# Patient Record
Sex: FEMALE | Race: WHITE | NOT HISPANIC OR LATINO | Employment: OTHER | URBAN - METROPOLITAN AREA
[De-identification: names, ages, dates, MRNs, and addresses within clinical notes are randomized per-mention and may not be internally consistent; named-entity substitution may affect disease eponyms.]

---

## 2018-05-14 ENCOUNTER — RECORDS - HEALTHEAST (OUTPATIENT)
Dept: LAB | Facility: CLINIC | Age: 68
End: 2018-05-14

## 2018-05-14 LAB
CHOLEST SERPL-MCNC: 183 MG/DL
FASTING STATUS PATIENT QL REPORTED: ABNORMAL
HDLC SERPL-MCNC: 47 MG/DL
LDLC SERPL CALC-MCNC: 117 MG/DL
TRIGL SERPL-MCNC: 97 MG/DL

## 2019-05-17 ENCOUNTER — RECORDS - HEALTHEAST (OUTPATIENT)
Dept: LAB | Facility: CLINIC | Age: 69
End: 2019-05-17

## 2019-05-17 LAB
CHOLEST SERPL-MCNC: 168 MG/DL
FASTING STATUS PATIENT QL REPORTED: NORMAL
HDLC SERPL-MCNC: 50 MG/DL
LDLC SERPL CALC-MCNC: 98 MG/DL
TRIGL SERPL-MCNC: 99 MG/DL

## 2021-02-05 ENCOUNTER — RECORDS - HEALTHEAST (OUTPATIENT)
Dept: LAB | Facility: CLINIC | Age: 71
End: 2021-02-05

## 2021-02-05 LAB
ANION GAP SERPL CALCULATED.3IONS-SCNC: 8 MMOL/L (ref 5–18)
BNP SERPL-MCNC: 45 PG/ML (ref 0–123)
BUN SERPL-MCNC: 16 MG/DL (ref 8–28)
CALCIUM SERPL-MCNC: 8.3 MG/DL (ref 8.5–10.5)
CHLORIDE BLD-SCNC: 109 MMOL/L (ref 98–107)
CO2 SERPL-SCNC: 22 MMOL/L (ref 22–31)
CREAT SERPL-MCNC: 0.8 MG/DL (ref 0.6–1.1)
FERRITIN SERPL-MCNC: 6 NG/ML (ref 10–130)
GFR SERPL CREATININE-BSD FRML MDRD: >60 ML/MIN/1.73M2
GLUCOSE BLD-MCNC: 91 MG/DL (ref 70–125)
POTASSIUM BLD-SCNC: 4 MMOL/L (ref 3.5–5)
SODIUM SERPL-SCNC: 139 MMOL/L (ref 136–145)
TRANSFERRIN SERPL-MCNC: 294 MG/DL (ref 212–360)
TSH SERPL DL<=0.005 MIU/L-ACNC: 0.56 UIU/ML (ref 0.3–5)
VIT B12 SERPL-MCNC: 217 PG/ML (ref 213–816)

## 2021-02-24 ENCOUNTER — RECORDS - HEALTHEAST (OUTPATIENT)
Dept: LAB | Facility: CLINIC | Age: 71
End: 2021-02-24

## 2021-02-24 LAB
CALCIUM SERPL-MCNC: 8.9 MG/DL (ref 8.5–10.5)
CALCIUM, IONIZED MEASURED: 1.2 MMOL/L (ref 1.11–1.3)
ION CA PH 7.4: 1.14 MMOL/L (ref 1.11–1.3)
PH: 7.31 (ref 7.35–7.45)

## 2021-03-31 ENCOUNTER — RECORDS - HEALTHEAST (OUTPATIENT)
Dept: LAB | Facility: CLINIC | Age: 71
End: 2021-03-31

## 2021-03-31 LAB
ANION GAP SERPL CALCULATED.3IONS-SCNC: 9 MMOL/L (ref 5–18)
BUN SERPL-MCNC: 13 MG/DL (ref 8–28)
CALCIUM SERPL-MCNC: 8.8 MG/DL (ref 8.5–10.5)
CHLORIDE BLD-SCNC: 104 MMOL/L (ref 98–107)
CO2 SERPL-SCNC: 29 MMOL/L (ref 22–31)
CREAT SERPL-MCNC: 0.79 MG/DL (ref 0.6–1.1)
GFR SERPL CREATININE-BSD FRML MDRD: >60 ML/MIN/1.73M2
GLUCOSE BLD-MCNC: 88 MG/DL (ref 70–125)
POTASSIUM BLD-SCNC: 3.4 MMOL/L (ref 3.5–5)
SODIUM SERPL-SCNC: 142 MMOL/L (ref 136–145)

## 2021-05-24 ENCOUNTER — RECORDS - HEALTHEAST (OUTPATIENT)
Dept: ADMINISTRATIVE | Facility: CLINIC | Age: 71
End: 2021-05-24

## 2021-05-26 ENCOUNTER — RECORDS - HEALTHEAST (OUTPATIENT)
Dept: ADMINISTRATIVE | Facility: CLINIC | Age: 71
End: 2021-05-26

## 2021-05-28 ENCOUNTER — RECORDS - HEALTHEAST (OUTPATIENT)
Dept: ADMINISTRATIVE | Facility: CLINIC | Age: 71
End: 2021-05-28

## 2021-06-02 ENCOUNTER — RECORDS - HEALTHEAST (OUTPATIENT)
Dept: ADMINISTRATIVE | Facility: CLINIC | Age: 71
End: 2021-06-02

## 2021-07-13 ENCOUNTER — RECORDS - HEALTHEAST (OUTPATIENT)
Dept: ADMINISTRATIVE | Facility: CLINIC | Age: 71
End: 2021-07-13

## 2021-07-21 ENCOUNTER — RECORDS - HEALTHEAST (OUTPATIENT)
Dept: ADMINISTRATIVE | Facility: CLINIC | Age: 71
End: 2021-07-21

## 2021-08-07 ENCOUNTER — TRANSFERRED RECORDS (OUTPATIENT)
Dept: HEALTH INFORMATION MANAGEMENT | Facility: CLINIC | Age: 71
End: 2021-08-07

## 2021-08-07 ENCOUNTER — HOSPITAL ENCOUNTER (INPATIENT)
Facility: CLINIC | Age: 71
LOS: 2 days | Discharge: HOME | DRG: 176 | End: 2021-08-09
Attending: EMERGENCY MEDICINE | Admitting: STUDENT IN AN ORGANIZED HEALTH CARE EDUCATION/TRAINING PROGRAM
Payer: COMMERCIAL

## 2021-08-07 ENCOUNTER — APPOINTMENT (OUTPATIENT)
Dept: CARDIOLOGY | Facility: CLINIC | Age: 71
DRG: 176 | End: 2021-08-07
Attending: EMERGENCY MEDICINE
Payer: COMMERCIAL

## 2021-08-07 DIAGNOSIS — I26.99 ACUTE PULMONARY EMBOLISM WITHOUT ACUTE COR PULMONALE, UNSPECIFIED PULMONARY EMBOLISM TYPE (H): Primary | ICD-10-CM

## 2021-08-07 DIAGNOSIS — I26.99 BILATERAL PULMONARY EMBOLISM (H): ICD-10-CM

## 2021-08-07 LAB
ANION GAP SERPL CALCULATED.3IONS-SCNC: 3 MMOL/L (ref 3–14)
BASOPHILS # BLD AUTO: 0.1 10E3/UL (ref 0–0.2)
BASOPHILS NFR BLD AUTO: 1 %
BUN SERPL-MCNC: 9 MG/DL (ref 7–30)
CALCIUM SERPL-MCNC: 7.5 MG/DL (ref 8.5–10.1)
CHLORIDE BLD-SCNC: 111 MMOL/L (ref 94–109)
CO2 SERPL-SCNC: 27 MMOL/L (ref 20–32)
CREAT SERPL-MCNC: 0.8 MG/DL (ref 0.52–1.04)
EOSINOPHIL # BLD AUTO: 0.2 10E3/UL (ref 0–0.7)
EOSINOPHIL NFR BLD AUTO: 3 %
ERYTHROCYTE [DISTWIDTH] IN BLOOD BY AUTOMATED COUNT: 16 % (ref 10–15)
ERYTHROCYTE [DISTWIDTH] IN BLOOD BY AUTOMATED COUNT: 16 % (ref 10–15)
GFR SERPL CREATININE-BSD FRML MDRD: 74 ML/MIN/1.73M2
GLUCOSE BLD-MCNC: 86 MG/DL (ref 70–99)
HCT VFR BLD AUTO: 38.3 % (ref 35–47)
HCT VFR BLD AUTO: 38.3 % (ref 35–47)
HGB BLD-MCNC: 11.7 G/DL (ref 11.7–15.7)
HGB BLD-MCNC: 11.7 G/DL (ref 11.7–15.7)
IMM GRANULOCYTES # BLD: 0 10E3/UL
IMM GRANULOCYTES NFR BLD: 0 %
LVEF ECHO: NORMAL
LYMPHOCYTES # BLD AUTO: 0.9 10E3/UL (ref 0.8–5.3)
LYMPHOCYTES NFR BLD AUTO: 14 %
MCH RBC QN AUTO: 28.6 PG (ref 26.5–33)
MCH RBC QN AUTO: 28.6 PG (ref 26.5–33)
MCHC RBC AUTO-ENTMCNC: 30.5 G/DL (ref 31.5–36.5)
MCHC RBC AUTO-ENTMCNC: 30.5 G/DL (ref 31.5–36.5)
MCV RBC AUTO: 94 FL (ref 78–100)
MCV RBC AUTO: 94 FL (ref 78–100)
MONOCYTES # BLD AUTO: 0.4 10E3/UL (ref 0–1.3)
MONOCYTES NFR BLD AUTO: 7 %
NEUTROPHILS # BLD AUTO: 4.8 10E3/UL (ref 1.6–8.3)
NEUTROPHILS NFR BLD AUTO: 75 %
NRBC # BLD AUTO: 0 10E3/UL
NRBC BLD AUTO-RTO: 0 /100
NT-PROBNP SERPL-MCNC: 1506 PG/ML (ref 0–900)
PLATELET # BLD AUTO: 256 10E3/UL (ref 150–450)
PLATELET # BLD AUTO: 256 10E3/UL (ref 150–450)
POTASSIUM BLD-SCNC: 3.8 MMOL/L (ref 3.4–5.3)
RBC # BLD AUTO: 4.09 10E6/UL (ref 3.8–5.2)
RBC # BLD AUTO: 4.09 10E6/UL (ref 3.8–5.2)
SODIUM SERPL-SCNC: 141 MMOL/L (ref 133–144)
TROPONIN I SERPL-MCNC: 0.03 UG/L (ref 0–0.04)
UFH PPP CHRO-ACNC: 0.5 IU/ML
WBC # BLD AUTO: 6.3 10E3/UL (ref 4–11)
WBC # BLD AUTO: 6.3 10E3/UL (ref 4–11)

## 2021-08-07 PROCEDURE — 99291 CRITICAL CARE FIRST HOUR: CPT | Mod: 25 | Performed by: EMERGENCY MEDICINE

## 2021-08-07 PROCEDURE — 120N000003 HC R&B IMCU UMMC

## 2021-08-07 PROCEDURE — 93308 TTE F-UP OR LMTD: CPT | Performed by: EMERGENCY MEDICINE

## 2021-08-07 PROCEDURE — 85520 HEPARIN ASSAY: CPT | Performed by: EMERGENCY MEDICINE

## 2021-08-07 PROCEDURE — 93306 TTE W/DOPPLER COMPLETE: CPT

## 2021-08-07 PROCEDURE — 85004 AUTOMATED DIFF WBC COUNT: CPT | Performed by: EMERGENCY MEDICINE

## 2021-08-07 PROCEDURE — 83880 ASSAY OF NATRIURETIC PEPTIDE: CPT | Performed by: NURSE PRACTITIONER

## 2021-08-07 PROCEDURE — 96366 THER/PROPH/DIAG IV INF ADDON: CPT | Performed by: EMERGENCY MEDICINE

## 2021-08-07 PROCEDURE — 36415 COLL VENOUS BLD VENIPUNCTURE: CPT | Performed by: EMERGENCY MEDICINE

## 2021-08-07 PROCEDURE — 96365 THER/PROPH/DIAG IV INF INIT: CPT | Performed by: EMERGENCY MEDICINE

## 2021-08-07 PROCEDURE — 84484 ASSAY OF TROPONIN QUANT: CPT | Performed by: EMERGENCY MEDICINE

## 2021-08-07 PROCEDURE — 99207 PR APP CREDIT; MD BILLING SHARED VISIT: CPT | Performed by: NURSE PRACTITIONER

## 2021-08-07 PROCEDURE — 80048 BASIC METABOLIC PNL TOTAL CA: CPT | Performed by: EMERGENCY MEDICINE

## 2021-08-07 PROCEDURE — 93308 TTE F-UP OR LMTD: CPT | Mod: 26 | Performed by: EMERGENCY MEDICINE

## 2021-08-07 PROCEDURE — 99207 PR CDG-HISTORY COMP: MEETS EXP. PROB FOCUSED- DOWN CODED LACK OF PFSH: CPT | Performed by: STUDENT IN AN ORGANIZED HEALTH CARE EDUCATION/TRAINING PROGRAM

## 2021-08-07 PROCEDURE — 93306 TTE W/DOPPLER COMPLETE: CPT | Mod: 26 | Performed by: STUDENT IN AN ORGANIZED HEALTH CARE EDUCATION/TRAINING PROGRAM

## 2021-08-07 PROCEDURE — 99285 EMERGENCY DEPT VISIT HI MDM: CPT | Mod: 25 | Performed by: EMERGENCY MEDICINE

## 2021-08-07 PROCEDURE — 85041 AUTOMATED RBC COUNT: CPT | Performed by: EMERGENCY MEDICINE

## 2021-08-07 PROCEDURE — C9803 HOPD COVID-19 SPEC COLLECT: HCPCS | Performed by: EMERGENCY MEDICINE

## 2021-08-07 PROCEDURE — 99221 1ST HOSP IP/OBS SF/LOW 40: CPT | Mod: AI | Performed by: STUDENT IN AN ORGANIZED HEALTH CARE EDUCATION/TRAINING PROGRAM

## 2021-08-07 PROCEDURE — 250N000011 HC RX IP 250 OP 636: Performed by: EMERGENCY MEDICINE

## 2021-08-07 RX ORDER — FUROSEMIDE 20 MG
20 TABLET ORAL DAILY
COMMUNITY
End: 2022-12-06

## 2021-08-07 RX ORDER — ONDANSETRON 4 MG/1
4 TABLET, ORALLY DISINTEGRATING ORAL EVERY 6 HOURS PRN
Status: DISCONTINUED | OUTPATIENT
Start: 2021-08-07 | End: 2021-08-09 | Stop reason: HOSPADM

## 2021-08-07 RX ORDER — ONDANSETRON 2 MG/ML
4 INJECTION INTRAMUSCULAR; INTRAVENOUS EVERY 30 MIN PRN
Status: DISCONTINUED | OUTPATIENT
Start: 2021-08-07 | End: 2021-08-08

## 2021-08-07 RX ORDER — HYDROMORPHONE HYDROCHLORIDE 1 MG/ML
0.5 INJECTION, SOLUTION INTRAMUSCULAR; INTRAVENOUS; SUBCUTANEOUS
Status: DISCONTINUED | OUTPATIENT
Start: 2021-08-07 | End: 2021-08-09 | Stop reason: HOSPADM

## 2021-08-07 RX ORDER — ONDANSETRON 2 MG/ML
4 INJECTION INTRAMUSCULAR; INTRAVENOUS EVERY 6 HOURS PRN
Status: DISCONTINUED | OUTPATIENT
Start: 2021-08-07 | End: 2021-08-09 | Stop reason: HOSPADM

## 2021-08-07 RX ORDER — HEPARIN SODIUM 10000 [USP'U]/100ML
0-5000 INJECTION, SOLUTION INTRAVENOUS CONTINUOUS
Status: DISCONTINUED | OUTPATIENT
Start: 2021-08-07 | End: 2021-08-08

## 2021-08-07 RX ORDER — FAMOTIDINE 40 MG/1
20 TABLET, FILM COATED ORAL DAILY
COMMUNITY
End: 2021-10-19

## 2021-08-07 RX ADMIN — HEPARIN SODIUM 1886.4 UNITS/HR: 10000 INJECTION, SOLUTION INTRAVENOUS at 15:44

## 2021-08-07 ASSESSMENT — ENCOUNTER SYMPTOMS
FATIGUE: 0
COUGH: 0
SHORTNESS OF BREATH: 1

## 2021-08-07 NOTE — ED NOTES
Bed: ED06  Expected date:   Expected time:   Means of arrival:   Comments:  Hold for Lori Liu--bilateral PE
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
## 2021-08-07 NOTE — ED NOTES
Up to the commode with staff. Staff setup otherwise IND. After movement, SOB O2 WNL on 2L NC. Recovered after a couple of minutes of rest.

## 2021-08-07 NOTE — ED TRIAGE NOTES
Patient BIBA from transfer from [facility] after patient went there for increased SOB.  Patient did have DVT in left leg a few months ago and was on coumadin for a short period but is no longer on them.

## 2021-08-07 NOTE — ED NOTES
Bed: IN02  Expected date: 8/7/21  Expected time: 2:30 PM  Means of arrival:   Comments:  Lori Liu ([birth year]): 70 year old female with 5 days of SOB/PALMER with elevated BNP and D-dimer.  Found to have large bilateral PEs. Patient on 2 liters oxygen; 18 gauge in AC; given 4000 units of Heparin; and trop and covid testing negative.  Call back number: [phone])
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
## 2021-08-08 ENCOUNTER — APPOINTMENT (OUTPATIENT)
Dept: ULTRASOUND IMAGING | Facility: CLINIC | Age: 71
DRG: 176 | End: 2021-08-08
Attending: INTERNAL MEDICINE
Payer: COMMERCIAL

## 2021-08-08 LAB
ANION GAP SERPL CALCULATED.3IONS-SCNC: 5 MMOL/L (ref 3–14)
BUN SERPL-MCNC: 16 MG/DL (ref 7–30)
CALCIUM SERPL-MCNC: 8.4 MG/DL (ref 8.5–10.1)
CHLORIDE BLD-SCNC: 109 MMOL/L (ref 94–109)
CO2 SERPL-SCNC: 24 MMOL/L (ref 20–32)
CREAT SERPL-MCNC: 0.88 MG/DL (ref 0.52–1.04)
ERYTHROCYTE [DISTWIDTH] IN BLOOD BY AUTOMATED COUNT: 15.9 % (ref 10–15)
FERRITIN SERPL-MCNC: 15 NG/ML (ref 8–252)
GFR SERPL CREATININE-BSD FRML MDRD: 66 ML/MIN/1.73M2
GLUCOSE BLD-MCNC: 92 MG/DL (ref 70–99)
HCT VFR BLD AUTO: 34.1 % (ref 35–47)
HGB BLD-MCNC: 10.5 G/DL (ref 11.7–15.7)
MCH RBC QN AUTO: 29 PG (ref 26.5–33)
MCHC RBC AUTO-ENTMCNC: 30.8 G/DL (ref 31.5–36.5)
MCV RBC AUTO: 94 FL (ref 78–100)
NT-PROBNP SERPL-MCNC: 597 PG/ML (ref 0–900)
PLATELET # BLD AUTO: 239 10E3/UL (ref 150–450)
POTASSIUM BLD-SCNC: 3.5 MMOL/L (ref 3.4–5.3)
RADIOLOGIST FLAGS: ABNORMAL
RBC # BLD AUTO: 3.62 10E6/UL (ref 3.8–5.2)
SODIUM SERPL-SCNC: 138 MMOL/L (ref 133–144)
UFH PPP CHRO-ACNC: 0.7 IU/ML
WBC # BLD AUTO: 6.7 10E3/UL (ref 4–11)

## 2021-08-08 PROCEDURE — 250N000013 HC RX MED GY IP 250 OP 250 PS 637: Performed by: NURSE PRACTITIONER

## 2021-08-08 PROCEDURE — 96366 THER/PROPH/DIAG IV INF ADDON: CPT | Performed by: EMERGENCY MEDICINE

## 2021-08-08 PROCEDURE — 86147 CARDIOLIPIN ANTIBODY EA IG: CPT | Performed by: INTERNAL MEDICINE

## 2021-08-08 PROCEDURE — 93970 EXTREMITY STUDY: CPT | Mod: 26 | Performed by: RADIOLOGY

## 2021-08-08 PROCEDURE — 85027 COMPLETE CBC AUTOMATED: CPT | Performed by: NURSE PRACTITIONER

## 2021-08-08 PROCEDURE — 93970 EXTREMITY STUDY: CPT

## 2021-08-08 PROCEDURE — 82728 ASSAY OF FERRITIN: CPT | Performed by: INTERNAL MEDICINE

## 2021-08-08 PROCEDURE — 80048 BASIC METABOLIC PNL TOTAL CA: CPT | Performed by: NURSE PRACTITIONER

## 2021-08-08 PROCEDURE — 99222 1ST HOSP IP/OBS MODERATE 55: CPT | Performed by: INTERNAL MEDICINE

## 2021-08-08 PROCEDURE — 86146 BETA-2 GLYCOPROTEIN ANTIBODY: CPT | Performed by: INTERNAL MEDICINE

## 2021-08-08 PROCEDURE — 36415 COLL VENOUS BLD VENIPUNCTURE: CPT | Performed by: INTERNAL MEDICINE

## 2021-08-08 PROCEDURE — 83921 ORGANIC ACID SINGLE QUANT: CPT | Performed by: INTERNAL MEDICINE

## 2021-08-08 PROCEDURE — 36415 COLL VENOUS BLD VENIPUNCTURE: CPT | Performed by: NURSE PRACTITIONER

## 2021-08-08 PROCEDURE — 250N000011 HC RX IP 250 OP 636: Performed by: NURSE PRACTITIONER

## 2021-08-08 PROCEDURE — 250N000013 HC RX MED GY IP 250 OP 250 PS 637: Performed by: INTERNAL MEDICINE

## 2021-08-08 PROCEDURE — 99233 SBSQ HOSP IP/OBS HIGH 50: CPT | Performed by: INTERNAL MEDICINE

## 2021-08-08 PROCEDURE — 85520 HEPARIN ASSAY: CPT | Performed by: NURSE PRACTITIONER

## 2021-08-08 PROCEDURE — 120N000003 HC R&B IMCU UMMC

## 2021-08-08 PROCEDURE — 85613 RUSSELL VIPER VENOM DILUTED: CPT | Performed by: INTERNAL MEDICINE

## 2021-08-08 PROCEDURE — 83880 ASSAY OF NATRIURETIC PEPTIDE: CPT | Performed by: NURSE PRACTITIONER

## 2021-08-08 RX ORDER — POLYETHYLENE GLYCOL 3350 17 G/17G
17 POWDER, FOR SOLUTION ORAL DAILY PRN
Status: DISCONTINUED | OUTPATIENT
Start: 2021-08-08 | End: 2021-08-09 | Stop reason: HOSPADM

## 2021-08-08 RX ORDER — ACETAMINOPHEN 650 MG/1
650 SUPPOSITORY RECTAL EVERY 4 HOURS PRN
Status: DISCONTINUED | OUTPATIENT
Start: 2021-08-08 | End: 2021-08-09 | Stop reason: HOSPADM

## 2021-08-08 RX ORDER — FAMOTIDINE 20 MG/1
20 TABLET, FILM COATED ORAL DAILY
Status: DISCONTINUED | OUTPATIENT
Start: 2021-08-08 | End: 2021-08-09 | Stop reason: HOSPADM

## 2021-08-08 RX ORDER — LIDOCAINE 40 MG/G
CREAM TOPICAL
Status: DISCONTINUED | OUTPATIENT
Start: 2021-08-08 | End: 2021-08-09 | Stop reason: HOSPADM

## 2021-08-08 RX ORDER — ACETAMINOPHEN 325 MG/1
650 TABLET ORAL EVERY 4 HOURS PRN
Status: DISCONTINUED | OUTPATIENT
Start: 2021-08-08 | End: 2021-08-09 | Stop reason: HOSPADM

## 2021-08-08 RX ORDER — HEPARIN SODIUM 10000 [USP'U]/100ML
0-5000 INJECTION, SOLUTION INTRAVENOUS CONTINUOUS
Status: DISPENSED | OUTPATIENT
Start: 2021-08-08 | End: 2021-08-08

## 2021-08-08 RX ADMIN — FAMOTIDINE 20 MG: 20 TABLET ORAL at 08:58

## 2021-08-08 RX ADMIN — HEPARIN SODIUM 1886 UNITS/HR: 10000 INJECTION, SOLUTION INTRAVENOUS at 05:13

## 2021-08-08 RX ADMIN — ACETAMINOPHEN 650 MG: 325 TABLET, FILM COATED ORAL at 14:41

## 2021-08-08 RX ADMIN — RIVAROXABAN 15 MG: 15 TABLET, FILM COATED ORAL at 19:51

## 2021-08-08 RX ADMIN — Medication 1 MG: at 00:47

## 2021-08-08 RX ADMIN — Medication 1 MG: at 21:07

## 2021-08-08 RX ADMIN — HEPARIN SODIUM 1886 UNITS/HR: 10000 INJECTION, SOLUTION INTRAVENOUS at 00:48

## 2021-08-08 ASSESSMENT — ACTIVITIES OF DAILY LIVING (ADL)
ADLS_ACUITY_SCORE: 17

## 2021-08-08 NOTE — PROGRESS NOTES
[facility]    Medicine Progress Note - Hospitalist Service, Gold 7       Date of Admission:  8/7/2021    Assessment & Plan           Lori Liu is a 70 year old female with past medical history significant for recently treated superficial LLE DVT (3/2021), claudication, EDDIE, GERD, bilateral knee osteoarthritis who presented to an OSH in [facility] with one week of worsening dyspnea and was found to have large bilateral PEs on CT.  PERT activated d/t concern for R heart strain and pt transferred to [facility].       # Dyspnea   # Respiratory failure   # Acute large bilateral PEs w/ septal flattening   Presented to OSH with 1 week dyspnea and found to have large bilateral PE w/ possible R heart strain on CT PE.  PERT team activated, and pt transferred to [facility] with plan for IR to review outside images once available in PACS.  Received heparin bolus during transit.  TTE on admission with decreased LV function, EF 50-55%, mild-moderate RV dilation, and reduced global RV function.  Currently stable on room air.    - no plans for thrombectomy from IR standpoint, will re-consult if hemodynamically unstable   -Await hematology consult given recurrent DVTs  - Continue heparin gtt .  Will place pharmacy consult for DOAC coverage.  If remains stable likely transition to DOAC versus Coumadin later today     # Recent superficial DVT of LLE - Diagnosed in 3/2021.  S/p treatment with warfarin x 1 month.       # Elevated troponin - Trop elevated to 0.025 at OSH.  Likely 2/2 demand in setting of PE.       # ? 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      # GERD - Continue PTA Famotidine.      # EDDIE - Hgb stable at 11.7.  Not currently taking any iron supplements.      # Low serum calcium - Ca 7.5 on admission.  8.4 on 08/08.     Diet: Regular Diet Adult    DVT Prophylaxis: on hepatin gtt  Sierra Catheter: Not present  Central  Lines: None  Code Status:   Full (d/w patient)     Disposition Plan   Expected discharge: Tomorrow recommended to prior living arrangement once anticoagulation plan established.     The patient's care was discussed with the Bedside Nurse, Patient and Patient's Family.    Last Gregorio MD, MD  Hospitalist Service, [address]  Securely message with the Vocera Web Console (learn more here)  Text page via [employer] Paging/Directory  Please see sign in/sign out for up to date coverage information    Clinically Significant Risk Factors Present on Admission               ______________________________________________________________________    Interval History   History reviewed.  Feels breathing is better compared to yesterday.  Denies any chest pain but still walking to the bathroom and back.  Denies any fever/chills  Denies any palpitations  Denies any recent long flights.  Says had only one episode of DVT in the past  4 point ROS done and negative unless mentioned     Data reviewed today: I reviewed all medications, new labs and imaging results over the last 24 hours. I personally reviewed the Echo and CT chest image(s) showing As below.    Physical Exam   BP (!) 142/88 (BP Location: Right arm)   Pulse 69   Temp 98.1  F (36.7  C) (Oral)   Resp 20   Wt 101.4 kg (223 lb 9.6 oz)   SpO2 93%   Gen- pleasant  lying in bed  HEENT- NAD, YOHANA  Neck- supple, no JVD elevation, no thyromegaly  CVS- I+II+ no m/r/g  RS- CTAB  Abdo- soft, no tenderness . No g/r/r   Ext- no edema   CNS- no gross focal deficit    Data    BMPRecent Labs   Lab 08/08/21  0507 08/07/21  1521    141   POTASSIUM 3.5 3.8   CHLORIDE 109 111*   PEDRO 8.4* 7.5*   CO2 24 27   BUN 16 9   CR 0.88 0.80   GLC 92 86     CBC  Recent Labs   Lab 08/08/21  0507 08/07/21  1521   WBC 6.7 6.3  6.3   RBC 3.62* 4.09  4.09   HGB 10.5* 11.7  11.7   HCT 34.1* 38.3  38.3   MCV 94 94  94   MCH 29.0 28.6  28.6   MCHC  30.8* 30.5*  30.5*   RDW 15.9* 16.0*  16.0*    256  256     INRNo lab results found in last 7 days.  LFTsNo lab results found in last 7 days.   PANCNo lab results found in last 7 days.    Recent Results (from the past 24 hour(s))   POC US ECHO LIMITED    Impression    Limited Bedside Cardiac Ultrasound, performed and interpreted by me.   Indication: Shortness of Breath and Bilateral PE.  Parasternal long axis views were acquired.   Image quality was satisfactory.    Findings:    Global left ventricular function appears intact.  There is no evidence of free fluid within the pericardium.    IMPRESSION: Grossly normal left ventricular function and chamber size.  No pericardial effusion..     Echocardiogram Complete   Result Value    LVEF  50-55% (borderline)    [facility]    [id number]  [id number]  [id number]  [id number]^JERRI^MAY^ARCHIE     [facility],[place]  Echocardiography Laboratory  [address] [place] [id number]     Name: DIANA HERRING  MRN: [id number]  : 1950  Study Date: 2021 04:53 PM  Age: 70 yrs  Gender: Female  Patient Location: [facility]  Reason For Study: Pulmonary Emboli  Ordering Physician: MAY GUTHRIE  Performed By: Janiya Haynes RDCS     BSA: 2.1 m2  Height: 65 in  Weight: 231 lb  HR: 75  BP: 148/93 mmHg  ______________________________________________________________________________  Procedure  Complete Portable Echo Adult. Technically difficult study. Poor acoustic  windows.  ______________________________________________________________________________  Interpretation Summary  Technically difficult study. Poor acoustic windows.  Left ventricular function is decreased. The ejection fraction is 50-55%  (borderline).  Mild to moderate right ventricular dilation is present.  Global right ventricular function is mildly to moderately reduced.  No pericardial effusion is  present.  ______________________________________________________________________________  Left Ventricle  Left ventricular wall thickness cannot evaluate. Left ventricular diastolic  function is not assessable. Left ventricular function is decreased. The  ejection fraction is 50-55% (borderline).     Right Ventricle  Mild to moderate right ventricular dilation is present. Global right  ventricular function is mildly to moderately reduced.     Atria  The atria cannot be assessed.     Mitral Valve  Trace mitral insufficiency is present.     Aortic Valve  Trileaflet aortic sclerosis without stenosis.     Tricuspid Valve  Mild tricuspid insufficiency is present. The right ventricular systolic  pressure is approximated at 32.3 mmHg plus the right atrial pressure.  Pulmonary hypertension is present.     Pulmonic Valve  The valve leaflets are not well visualized. On Doppler interrogation, there is  no significant stenosis or regurgitation.     Vessels  The thoracic aorta cannot be assessed. The inferior vena cava was normal in  size with preserved respiratory variability.     Pericardium  No pericardial effusion is present.     ______________________________________________________________________________  MMode/2D Measurements & Calculations  IVSd: 1.4 cm  LVIDd: 4.4 cm  LVIDs: 3.1 cm  LVPWd: 1.1 cm  FS: 29.9 %     LV mass(C)d: 206.1 grams  LV mass(C)dI: 98.0 grams/m2  Ao root diam: 3.5 cm  asc Aorta Diam: 3.8 cm  LVOT diam: 2.2 cm  LVOT area: 3.8 cm2  LA Volume (BP): 58.3 ml  LA Volume Index (BP): 27.8 ml/m2  RWT: 0.50     Doppler Measurements & Calculations  MV E max kale: 47.5 cm/sec  MV A max kale: 75.7 cm/sec  MV E/A: 0.63     MV dec slope: 238.0 cm/sec2  PA acc time: 0.19 sec  TR max kale: 284.0 cm/sec  TR max P.3 mmHg  E/E' av.1  Lateral E/e': 14.1  Medial E/e': 14.1     ______________________________________________________________________________  Report approved by: MD Alber Barrientos  08/07/2021 05:27 PM

## 2021-08-08 NOTE — PLAN OF CARE
Admission          8/7/2021 05:46AM  -----------------------------------------------------------  Reason for admission: Pulmonary Embolism  Primary team notified of pt arrival.  Admitted from: ED  Via: bed  Accompanied by:   Belongings: At bedside  Admission Profile: complete  Teaching: orientation to unit and call light- call light within reach, call don't fall, use of console, meal times, when to call for the RN, and enforced importance of safety   Access: PIV  Telemetry:Placed on pt  Ht./Wt.: complete  Code Status verified on armband: yes  2 RN Skin Assessment Completed By:  Amber Perry  Med Rec completed: yes  Bed surface reassessed with algorithm and charted: yes  New bed surface ordered: no  Suction/Ambu bag/Flowmeter at bedside: yes    Pt status: A+Ox4. VSS. RA-2LNC with activity, dyspnea on exertion. Regular diet. Up to commode - adequate urine output. L PIV with Heparin gtt @ 1886 units/h, Xa therapeutic - recheck in AM    Temp:  [97.7  F (36.5  C)-98.6  F (37  C)] 98.6  F (37  C)  Pulse:  [65-84] 65  Resp:  [16-18] 16  BP: (113-149)/(74-93) 113/74  FiO2 (%):  [0 %] 0 %  SpO2:  [96 %-98 %] 96 %
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
## 2021-08-09 VITALS
WEIGHT: 223.6 LBS | OXYGEN SATURATION: 95 % | DIASTOLIC BLOOD PRESSURE: 87 MMHG | SYSTOLIC BLOOD PRESSURE: 153 MMHG | TEMPERATURE: 97.6 F | RESPIRATION RATE: 24 BRPM | HEART RATE: 62 BPM

## 2021-08-09 LAB
ANION GAP SERPL CALCULATED.3IONS-SCNC: 4 MMOL/L (ref 3–14)
B2 GLYCOPROT1 IGG SERPL IA-ACNC: 4.4 U/ML
B2 GLYCOPROT1 IGM SERPL IA-ACNC: <2.4 U/ML
B2 GLYCOPROT1 IGM SERPL IA-ACNC: <2.4 U/ML
BUN SERPL-MCNC: 18 MG/DL (ref 7–30)
CALCIUM SERPL-MCNC: 8.6 MG/DL (ref 8.5–10.1)
CARDIOLIPIN IGG SER IA-ACNC: 6.8 GPL-U/ML
CARDIOLIPIN IGG SER IA-ACNC: NEGATIVE
CARDIOLIPIN IGM SER IA-ACNC: 2.4 MPL-U/ML
CARDIOLIPIN IGM SER IA-ACNC: NEGATIVE
CHLORIDE BLD-SCNC: 108 MMOL/L (ref 94–109)
CO2 SERPL-SCNC: 29 MMOL/L (ref 20–32)
CREAT SERPL-MCNC: 0.88 MG/DL (ref 0.52–1.04)
ERYTHROCYTE [DISTWIDTH] IN BLOOD BY AUTOMATED COUNT: 15.7 % (ref 10–15)
GFR SERPL CREATININE-BSD FRML MDRD: 66 ML/MIN/1.73M2
GLUCOSE BLD-MCNC: 91 MG/DL (ref 70–99)
HCT VFR BLD AUTO: 34.5 % (ref 35–47)
HGB BLD-MCNC: 10.4 G/DL (ref 11.7–15.7)
MCH RBC QN AUTO: 28.6 PG (ref 26.5–33)
MCHC RBC AUTO-ENTMCNC: 30.1 G/DL (ref 31.5–36.5)
MCV RBC AUTO: 95 FL (ref 78–100)
PLATELET # BLD AUTO: 265 10E3/UL (ref 150–450)
POTASSIUM BLD-SCNC: 3.8 MMOL/L (ref 3.4–5.3)
RBC # BLD AUTO: 3.64 10E6/UL (ref 3.8–5.2)
SODIUM SERPL-SCNC: 141 MMOL/L (ref 133–144)
UFH PPP CHRO-ACNC: >1.1 IU/ML
WBC # BLD AUTO: 5.3 10E3/UL (ref 4–11)

## 2021-08-09 PROCEDURE — 250N000013 HC RX MED GY IP 250 OP 250 PS 637: Performed by: INTERNAL MEDICINE

## 2021-08-09 PROCEDURE — 85027 COMPLETE CBC AUTOMATED: CPT | Performed by: INTERNAL MEDICINE

## 2021-08-09 PROCEDURE — 99239 HOSP IP/OBS DSCHRG MGMT >30: CPT | Performed by: INTERNAL MEDICINE

## 2021-08-09 PROCEDURE — 36415 COLL VENOUS BLD VENIPUNCTURE: CPT | Performed by: STUDENT IN AN ORGANIZED HEALTH CARE EDUCATION/TRAINING PROGRAM

## 2021-08-09 PROCEDURE — 250N000013 HC RX MED GY IP 250 OP 250 PS 637: Performed by: NURSE PRACTITIONER

## 2021-08-09 PROCEDURE — 80048 BASIC METABOLIC PNL TOTAL CA: CPT | Performed by: INTERNAL MEDICINE

## 2021-08-09 PROCEDURE — 85520 HEPARIN ASSAY: CPT | Performed by: STUDENT IN AN ORGANIZED HEALTH CARE EDUCATION/TRAINING PROGRAM

## 2021-08-09 RX ORDER — NALOXONE HYDROCHLORIDE 0.4 MG/ML
0.4 INJECTION, SOLUTION INTRAMUSCULAR; INTRAVENOUS; SUBCUTANEOUS
Status: DISCONTINUED | OUTPATIENT
Start: 2021-08-09 | End: 2021-08-09 | Stop reason: HOSPADM

## 2021-08-09 RX ORDER — NALOXONE HYDROCHLORIDE 0.4 MG/ML
0.2 INJECTION, SOLUTION INTRAMUSCULAR; INTRAVENOUS; SUBCUTANEOUS
Status: DISCONTINUED | OUTPATIENT
Start: 2021-08-09 | End: 2021-08-09 | Stop reason: HOSPADM

## 2021-08-09 RX ADMIN — RIVAROXABAN 15 MG: 15 TABLET, FILM COATED ORAL at 08:19

## 2021-08-09 RX ADMIN — FAMOTIDINE 20 MG: 20 TABLET ORAL at 08:19

## 2021-08-09 ASSESSMENT — ACTIVITIES OF DAILY LIVING (ADL)
ADLS_ACUITY_SCORE: 17

## 2021-08-09 NOTE — DISCHARGE SUMMARY
[facility]  Hospitalist Discharge Summary      Date of Admission:  8/7/2021  Date of Discharge:  8/9/2021  Discharging Provider: Last Gregorio MD, MD  Discharge Team: Hospitalist Service, Gold 7    Discharge Diagnoses   Pulmonary embolism  Deep venous thrombosis    Acute Hypoxemic respiratory failure secondary to above    Follow-ups Needed After Discharge   Follow-up Appointments     Adult [facility] Follow-up and recommended labs and tests      Follow up with primary care provider, Angelika Haney, within 7 days   for hospital follow- up.        Follow up with Hematology , at (location with clinic name or city) [facility],   within 2-3 weeks  to follow up on results.     Appointments on [place] and/or [facility] (with [facility] or [facility]   provider or service). Call [phone] if you haven't heard regarding   these appointments within 7 days of discharge.         {  Unresulted Labs Ordered in the Past 30 Days of this Admission     Date and Time Order Name Status Description    8/8/2021  2:28 PM Methylmalonic Acid In process     8/8/2021  2:27 PM Lupus Anticoagulant Panel In process         Discharge Disposition   Discharged to home  Condition at discharge: Stable    Hospital Course   Lori Liu is a 70 year old female with past medical history significant for recently treated superficial LLE DVT (3/2021), claudication, EDDIE, GERD, bilateral knee osteoarthritis who presented to an OSH in [facility] with one week of worsening dyspnea and was found to have large bilateral PEs on CT.  PERT activated d/t concern for R heart strain and pt transferred to [facility].       # Dyspnea   # Respiratory failure   # Acute large bilateral PEs w/ septal flattening   Presented to OSH with 1 week dyspnea and found to have large bilateral PE w/ possible R heart strain on CT PE.  PERT team activated, and pt transferred to [facility] with plan for IR to review outside images once available in  PACS.  Received heparin bolus during transit.  TTE on admission with decreased LV function, EF 50-55%, mild-moderate RV dilation, and reduced global RV function.  Currently stable on room air.    - no plans for thrombectomy from IR standpoint, she remained hemodynamically stable while in the hospital and was started on heparin drip.  After review by hematology it was transitioned to oral Xarelto 15 mg twice daily for 21 days followed by 20 mg daily.  -She will need follow-up with hematologist as outpatient.     # Elevated troponin - Trop elevated to 0.025 at OSH.  Likely 2/2 demand in setting of PE.       # ? 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      # GERD - Continue PTA Famotidine.      # EDDIE - Hgb stable at 11.7.  Not currently taking any iron supplements.      # Low serum calcium - Ca 7.5 on admission.  8.4 on 08/08.     Consultations This Hospital Stay   PHARMACY IP CONSULT  PHARMACY IP CONSULT  HEMATOLOGY ADULT IP CONSULT  PHARMACY IP CONSULT  PHARMACY IP CONSULT  PHARMACY LIAISON FOR MEDICATION COVERAGE CONSULT  PHARMACY IP CONSULT    Code Status   Full Code    Time Spent on this Encounter   I, Last Gregorio MD, MD, personally saw the patient today and spent greater than 30 minutes discharging this patient.       Last Gregoroi MD, MD  [facility] [address] [address]  Phone: [phone]  ______________________________________________________________________    Physical Exam   Vital Signs: Temp: 97.6  F (36.4  C) Temp src: Oral BP: (!) 153/87 Pulse: 62   Resp: 24 SpO2: 95 % O2 Device: None (Room air) Oxygen Delivery: 2 LPM  Weight: 223 lbs 9.6 oz  Gen- pleasant  lying in bed  HEENT- NAD, YOHANA  Neck- supple, no JVD elevation, no thyromegaly  CVS- I+II+ no m/r/g  RS- CTAB  Abdo- soft, no tenderness . No g/r/r   Ext- no edema   CNS- no gross focal deficit     Primary Care Physician   Angelika WILKINSON  [place]    Discharge Orders      Reason for your hospital stay    Lori Liu is a 70 year old female with past medical history significant for recently treated superficial LLE DVT (3/2021), claudication, EDDIE, GERD, bilateral knee osteoarthritis who presented to an OSH in [facility] with one week of worsening dyspnea and was found to have large bilateral PEs on CT.  PERT activated d/t concern for R heart strain and pt transferred to [facility].     Activity    Your activity upon discharge: activity as tolerated     Discharge Instructions    Xarelto 15 mg twice daily x21 days, followed by 20 mg daily.  She will need to stay anticoagulation indefinitely.      -Lupus anticoagulant, beta-2 glycoprotein 1 IgG and Ig M, cardiolipin IgG and IgM to be followed in  follow-up clinica visit at  at [facility]     Adult [facility] Follow-up and recommended labs and tests    Follow up with primary care provider, Angelika Haney, within 7 days for hospital follow- up.        Follow up with Hematology , at (location with clinic name or city) [facility], within 2-3 weeks  to follow up on results.     Appointments on [place] and/or [facility] (with [facility] or [facility] provider or service). Call [phone] if you haven't heard regarding these appointments within 7 days of discharge.     When to contact your care team    Call your primary doctor if you have any of the following: temperature greater than 100.4 or less than 96,  increased shortness of breath, or increased pain.     Diet    Follow this diet upon discharge: Orders Placed This Encounter      Regular Diet Adult       Significant Results and Procedures   Results for orders placed or performed during the hospital encounter of 08/07/21   POC US ECHO LIMITED    Impression    Limited Bedside Cardiac Ultrasound, performed and interpreted by me.   Indication: Shortness of Breath and Bilateral PE.  Parasternal long axis views were acquired.   Image  quality was satisfactory.    Findings:    Global left ventricular function appears intact.  There is no evidence of free fluid within the pericardium.    IMPRESSION: Grossly normal left ventricular function and chamber size.  No pericardial effusion..     US Lower Extremity Venous Duplex Bilateral     Value    Radiologist flags DVT (Urgent)    Narrative    EXAMINATION: DOPPLER VENOUS ULTRASOUND OF BILATERAL LOWER EXTREMITIES,  8/8/2021 5:51 PM     COMPARISON: None.    HISTORY: Swelling, rule out DVT    TECHNIQUE:  Gray-scale evaluation with compression, spectral flow and  color Doppler assessment of the deep venous system of both legs from  groin to knee, and then at the ankles.    FINDINGS:  The right popliteal and one on the paired posterior tibial veins are  partially compressible, with the other paired posterior tibial vein  noncompressible. The right common femoral and femoral vein are fully  compressible. The left common femoral, femoral, popliteal, posterior  tibial veins are fully compressible with normal blood flow. There is a  3.8 x 6.2 x 1.7 cm anechoic fluid collection in the left popliteal  fossa.      Impression    IMPRESSION:  1.  Nonocclusive deep vein thrombosis of the right popliteal and 1 of  the paired posterior tibial veins. Occlusive thrombus of the other  paired posterior tibial vein.  2.  Left sided Baker's cyst.       [Urgent Result: DVT]    Finding was identified on 8/8/2021 5:50 PM.     Kris Arteaga RN was contacted by Dr. Romero at 8/8/2021 5:53 PM and  verbalized understanding of the urgent finding.     I have personally reviewed the examination and initial interpretation  and I agree with the findings.    RONALD MOSER MD         SYSTEM ID:  [id number]   Echocardiogram Complete     Value    LVEF  50-55% (borderline)    Narrative    [id number]  [id number]  [id number]  [id number]^JERRI^MAY^ARCHIE     [facility],[place]  Echocardiography Laboratory  [address] [place] [address]  [place] [address]     Name: DIANA HERRING  MRN: [id number]  : 1950  Study Date: 2021 04:53 PM  Age: 70 yrs  Gender: Female  Patient Location: [facility]  Reason For Study: Pulmonary Emboli  Ordering Physician: MAY GUTHRIE  Performed By: Janiya Haynes RDCS     BSA: 2.1 m2  Height: 65 in  Weight: 231 lb  HR: 75  BP: 148/93 mmHg  ______________________________________________________________________________  Procedure  Complete Portable Echo Adult. Technically difficult study. Poor acoustic  windows.  ______________________________________________________________________________  Interpretation Summary  Technically difficult study. Poor acoustic windows.  Left ventricular function is decreased. The ejection fraction is 50-55%  (borderline).  Mild to moderate right ventricular dilation is present.  Global right ventricular function is mildly to moderately reduced.  No pericardial effusion is present.  ______________________________________________________________________________  Left Ventricle  Left ventricular wall thickness cannot evaluate. Left ventricular diastolic  function is not assessable. Left ventricular function is decreased. The  ejection fraction is 50-55% (borderline).     Right Ventricle  Mild to moderate right ventricular dilation is present. Global right  ventricular function is mildly to moderately reduced.     Atria  The atria cannot be assessed.     Mitral Valve  Trace mitral insufficiency is present.     Aortic Valve  Trileaflet aortic sclerosis without stenosis.     Tricuspid Valve  Mild tricuspid insufficiency is present. The right ventricular systolic  pressure is approximated at 32.3 mmHg plus the right atrial pressure.  Pulmonary hypertension is present.     Pulmonic Valve  The valve leaflets are not well visualized. On Doppler interrogation, there is  no significant stenosis or regurgitation.     Vessels  The thoracic aorta cannot be assessed. The inferior  vena cava was normal in  size with preserved respiratory variability.     Pericardium  No pericardial effusion is present.     ______________________________________________________________________________  MMode/2D Measurements & Calculations  IVSd: 1.4 cm  LVIDd: 4.4 cm  LVIDs: 3.1 cm  LVPWd: 1.1 cm  FS: 29.9 %     LV mass(C)d: 206.1 grams  LV mass(C)dI: 98.0 grams/m2  Ao root diam: 3.5 cm  asc Aorta Diam: 3.8 cm  LVOT diam: 2.2 cm  LVOT area: 3.8 cm2  LA Volume (BP): 58.3 ml  LA Volume Index (BP): 27.8 ml/m2  RWT: 0.50     Doppler Measurements & Calculations  MV E max kale: 47.5 cm/sec  MV A max kale: 75.7 cm/sec  MV E/A: 0.63     MV dec slope: 238.0 cm/sec2  PA acc time: 0.19 sec  TR max kale: 284.0 cm/sec  TR max P.3 mmHg  E/E' av.1  Lateral E/e': 14.1  Medial E/e': 14.1     ______________________________________________________________________________  Report approved by: MD Alber Barrientos 2021 05:27 PM               Discharge Medications   Discharge Medication List as of 2021 11:28 AM      START taking these medications    Details   !! rivaroxaban ANTICOAGULANT (XARELTO ANTICOAGULANT) 20 MG TABS tablet Take 1 tablet (20 mg) by mouth daily (with dinner), Disp-30 tablet, R-4, E-Prescribe      !! rivaroxaban ANTICOAGULANT (XARELTO) 15 MG TABS tablet Take 1 tablet (15 mg) by mouth 2 times daily (with meals) for 21 days Followed by 20 mg daily, Disp-42 tablet, R-0, E-Prescribe       !! - Potential duplicate medications found. Please discuss with provider.      CONTINUE these medications which have NOT CHANGED    Details   famotidine (PEPCID) 40 MG tablet Take 20 mg by mouth daily, Historical      furosemide (LASIX) 20 MG tablet Take 20 mg by mouth daily, Historical           Allergies   Allergies   Allergen Reactions     Penicillins Hives and Itching

## 2021-08-09 NOTE — PLAN OF CARE
Neuro: A&Ox4.   Cardiac: SR, rates 60s - 80s. VSS, afebrile.   Respiratory: Sats >92% on RA while awake, 1-2L via NC while resting sats 88-92%. Mild PALMER.  GI/: Adequate UOP. No BM today, LBM 8/6.  Diet/appetite: Regular diet, good appetite   Activity: SBA up to bathroom and in halls.  Pain: Denies pain besides slight H/A this afternoon, relieved w/Tylenol x1.   Skin/LDAs: No new deficits noted. LPIV: SL'ed  *Discontinued Heparin gtt @19:00, started on Xarelto per orders-   *Ultrasound BLE done, positive for DVT in RLE near knee (providers aware, see note)    Plan: Plans for possible discharge home tomorrow, depends on whether anticoag adjustments needed. Continue with POC. Notify primary team with changes.

## 2021-08-09 NOTE — PROVIDER NOTIFICATION
Time of notification: 7:03 PM  Provider notified: Alessandra Devine CNP  Reason for notification: *Pt has DVT in Right lower extremity, notified by Dr. Romero (Radiology [id number]). Still want Heparin gtt stopped & Xarelto given? Thanks, Kris RN [phone]  Temp:  [97.6  F (36.4  C)-98.6  F (37  C)] 98.5  F (36.9  C)  Pulse:  [63-87] 87  Resp:  [16-20] 18  BP: (113-149)/(74-88) 142/84  FiO2 (%):  [0 %] 0 %  SpO2:  [93 %-98 %] 96 %  Orders received: Alessandra Devine CNP- notified Dr. Gregorio (Gold 7) of Ultrasound result & awaiting call back.  19:09 Call back from Alessandra Devine: Per Dr. Gregorio, okay to give Xarelto & discontinue Heparin infusion as ordered. Notify provider if pt has worsening SOB &/or change in oxygen needs or vital sings.

## 2021-08-09 NOTE — PLAN OF CARE
DISCHARGE                         8/9/2021 1300  ----------------------------------------------------------------------------  Discharged to: Home  Via: private transportation  Accompanied by: Family  Discharge Instructions: regular diet, activity as tolerated, medications, follow up appointments, when to call the MD, aftercare instructions.  [facility]; medication list reviewed & sent with pt  Follow Up Appointments: arranged; information given  Belongings: All sent with pt  IV: d/c'd  Telemetry: d/c'd  Pt exhibits understanding of above discharge instructions; all questions answered.    Discharge Paperwork: Signed, copied, and sent home with patient.

## 2021-08-09 NOTE — CONSULTS
Discharge Pharmacy Test Claim    Eliquis or xarelto are covered through patient's [employer] Medicare part D plan. Copay for either DOAC is $47/mo. [facility] has a one-time use 30-day free trial voucher for eliquis or xarelto available.      Mindy Caldwell  [facility] Pharmacy Liaison  Ph: [phone] Pager: [phone]

## 2021-08-12 LAB
DRVVT SCREEN RATIO: 0.92
HEPZYMED PTT RATIO: 1.11
HEPZYMED PTT-LA: 41 SECONDS (ref 31–45)
HEPZYMED THROMBIN TIME: 18.2 SECONDS (ref 15.7–21.7)
INR PPP: 1.3 (ref 0.85–1.15)
LA PPP-IMP: NEGATIVE
LUPUS INTERPRETATION: ABNORMAL
PATIENT PTT-LA: 166 SECONDS (ref 31–45)
PLATELET NEUTRALIZATION: ABNORMAL
THROMBIN TIME: >60 SECONDS (ref 13–19)

## 2021-08-13 LAB — METHYLMALONATE SERPL-SCNC: 0.3 UMOL/L (ref 0–0.4)

## 2021-08-17 ENCOUNTER — LAB REQUISITION (OUTPATIENT)
Dept: LAB | Facility: CLINIC | Age: 71
End: 2021-08-17
Payer: COMMERCIAL

## 2021-08-17 DIAGNOSIS — E83.51 HYPOCALCEMIA: ICD-10-CM

## 2021-08-17 LAB
ANION GAP SERPL CALCULATED.3IONS-SCNC: 11 MMOL/L (ref 5–18)
BUN SERPL-MCNC: 13 MG/DL (ref 8–28)
CALCIUM SERPL-MCNC: 8.9 MG/DL (ref 8.5–10.5)
CHLORIDE BLD-SCNC: 106 MMOL/L (ref 98–107)
CO2 SERPL-SCNC: 23 MMOL/L (ref 22–31)
CREAT SERPL-MCNC: 0.82 MG/DL (ref 0.6–1.1)
GFR SERPL CREATININE-BSD FRML MDRD: 72 ML/MIN/1.73M2
GLUCOSE BLD-MCNC: 92 MG/DL (ref 70–125)
POTASSIUM BLD-SCNC: 4 MMOL/L (ref 3.5–5)
SODIUM SERPL-SCNC: 140 MMOL/L (ref 136–145)

## 2021-08-17 PROCEDURE — 80048 BASIC METABOLIC PNL TOTAL CA: CPT | Mod: ORL | Performed by: PHYSICIAN ASSISTANT

## 2021-10-19 ENCOUNTER — HOSPITAL ENCOUNTER (EMERGENCY)
Facility: CLINIC | Age: 71
Discharge: HOME | End: 2021-10-20
Attending: EMERGENCY MEDICINE | Admitting: INTERNAL MEDICINE
Payer: COMMERCIAL

## 2021-10-19 ENCOUNTER — APPOINTMENT (OUTPATIENT)
Dept: CT IMAGING | Facility: CLINIC | Age: 71
End: 2021-10-19
Attending: EMERGENCY MEDICINE
Payer: COMMERCIAL

## 2021-10-19 ENCOUNTER — APPOINTMENT (OUTPATIENT)
Dept: RADIOLOGY | Facility: CLINIC | Age: 71
End: 2021-10-19
Attending: EMERGENCY MEDICINE
Payer: COMMERCIAL

## 2021-10-19 DIAGNOSIS — R06.00 DYSPNEA: Primary | ICD-10-CM

## 2021-10-19 DIAGNOSIS — R09.02 HYPOXIA: ICD-10-CM

## 2021-10-19 DIAGNOSIS — R06.09 EXERTIONAL DYSPNEA: ICD-10-CM

## 2021-10-19 LAB
ANION GAP SERPL CALCULATED.3IONS-SCNC: 9 MMOL/L (ref 5–18)
ATRIAL RATE - MUSE: 65 BPM
BNP SERPL-MCNC: 50 PG/ML (ref 0–123)
BUN SERPL-MCNC: 11 MG/DL (ref 8–28)
CALCIUM SERPL-MCNC: 9.5 MG/DL (ref 8.5–10.5)
CHLORIDE BLD-SCNC: 106 MMOL/L (ref 98–107)
CO2 SERPL-SCNC: 25 MMOL/L (ref 22–31)
CREAT SERPL-MCNC: 0.95 MG/DL (ref 0.6–1.1)
DIASTOLIC BLOOD PRESSURE - MUSE: 89 MMHG
ERYTHROCYTE [DISTWIDTH] IN BLOOD BY AUTOMATED COUNT: 17.9 % (ref 10–15)
GFR SERPL CREATININE-BSD FRML MDRD: 60 ML/MIN/1.73M2
GLUCOSE BLD-MCNC: 89 MG/DL (ref 70–125)
HCT VFR BLD AUTO: 36.7 % (ref 35–47)
HGB BLD-MCNC: 11.5 G/DL (ref 11.7–15.7)
INTERPRETATION ECG - MUSE: NORMAL
MCH RBC QN AUTO: 31 PG (ref 26.5–33)
MCHC RBC AUTO-ENTMCNC: 31.3 G/DL (ref 31.5–36.5)
MCV RBC AUTO: 99 FL (ref 78–100)
P AXIS - MUSE: 44 DEGREES
PLATELET # BLD AUTO: 292 10E3/UL (ref 150–450)
POTASSIUM BLD-SCNC: 4 MMOL/L (ref 3.5–5)
PR INTERVAL - MUSE: 198 MS
PROCALCITONIN SERPL-MCNC: 0.04 NG/ML (ref 0–0.49)
QRS DURATION - MUSE: 96 MS
QT - MUSE: 442 MS
QTC - MUSE: 459 MS
R AXIS - MUSE: -23 DEGREES
RBC # BLD AUTO: 3.71 10E6/UL (ref 3.8–5.2)
SODIUM SERPL-SCNC: 140 MMOL/L (ref 136–145)
SYSTOLIC BLOOD PRESSURE - MUSE: 139 MMHG
T AXIS - MUSE: 27 DEGREES
TROPONIN I SERPL-MCNC: <0.01 NG/ML (ref 0–0.29)
VENTRICULAR RATE- MUSE: 65 BPM
WBC # BLD AUTO: 8.2 10E3/UL (ref 4–11)

## 2021-10-19 PROCEDURE — 84484 ASSAY OF TROPONIN QUANT: CPT | Performed by: EMERGENCY MEDICINE

## 2021-10-19 PROCEDURE — 71275 CT ANGIOGRAPHY CHEST: CPT

## 2021-10-19 PROCEDURE — 93005 ELECTROCARDIOGRAM TRACING: CPT | Performed by: EMERGENCY MEDICINE

## 2021-10-19 PROCEDURE — 71045 X-RAY EXAM CHEST 1 VIEW: CPT

## 2021-10-19 PROCEDURE — 99220 PR INITIAL OBSERVATION CARE,LEVEL III: CPT | Performed by: INTERNAL MEDICINE

## 2021-10-19 PROCEDURE — 84145 PROCALCITONIN (PCT): CPT | Performed by: EMERGENCY MEDICINE

## 2021-10-19 PROCEDURE — 87637 SARSCOV2&INF A&B&RSV AMP PRB: CPT | Performed by: EMERGENCY MEDICINE

## 2021-10-19 PROCEDURE — C9803 HOPD COVID-19 SPEC COLLECT: HCPCS

## 2021-10-19 PROCEDURE — 36415 COLL VENOUS BLD VENIPUNCTURE: CPT | Performed by: EMERGENCY MEDICINE

## 2021-10-19 PROCEDURE — 80048 BASIC METABOLIC PNL TOTAL CA: CPT | Performed by: EMERGENCY MEDICINE

## 2021-10-19 PROCEDURE — 250N000011 HC RX IP 250 OP 636: Performed by: EMERGENCY MEDICINE

## 2021-10-19 PROCEDURE — 83880 ASSAY OF NATRIURETIC PEPTIDE: CPT | Performed by: EMERGENCY MEDICINE

## 2021-10-19 PROCEDURE — 99285 EMERGENCY DEPT VISIT HI MDM: CPT | Mod: 25

## 2021-10-19 PROCEDURE — 85027 COMPLETE CBC AUTOMATED: CPT | Performed by: EMERGENCY MEDICINE

## 2021-10-19 RX ORDER — IOPAMIDOL 755 MG/ML
100 INJECTION, SOLUTION INTRAVASCULAR ONCE
Status: COMPLETED | OUTPATIENT
Start: 2021-10-19 | End: 2021-10-19

## 2021-10-19 RX ORDER — BACILLUS COAGULANS 1B CELL
1 CAPSULE ORAL DAILY
COMMUNITY
Start: 2021-09-17

## 2021-10-19 RX ORDER — UREA 10 %
1000 LOTION (ML) TOPICAL DAILY
COMMUNITY
Start: 2021-08-27

## 2021-10-19 RX ADMIN — IOPAMIDOL 75 ML: 755 INJECTION, SOLUTION INTRAVENOUS at 19:52

## 2021-10-19 ASSESSMENT — ENCOUNTER SYMPTOMS
SHORTNESS OF BREATH: 1
DIARRHEA: 0
VOMITING: 0
NAUSEA: 0
BLOOD IN STOOL: 1
COUGH: 1
FEVER: 0

## 2021-10-19 ASSESSMENT — MIFFLIN-ST. JEOR: SCORE: 1531.94

## 2021-10-19 NOTE — ED NOTES
Per ED tech Emma, patient able to walk the length of the ED hallway, sats at 95% RA at rest, after ambulation sats dropped to 86% and patient states she is sob.  MD notified.

## 2021-10-19 NOTE — ED TRIAGE NOTES
Patient is here with shortness of breath since August and worsened in the past two weeks. No other symptoms at this time. She did have a PE in August.

## 2021-10-19 NOTE — ED PROVIDER NOTES
EMERGENCY DEPARTMENT ENCOUNTER      NAME: Lori Liu  AGE: 70 year old female  YOB: 1950  MRN: [id number]  EVALUATION DATE & TIME: 10/19/2021  5:53 PM    PCP: Angelika Haney    ED PROVIDER: Manoj Sorenson M.D.      Chief Complaint   Patient presents with     Shortness of Breath         FINAL IMPRESSION:  1. Exertional dyspnea    2. Hypoxia          ED COURSE & MEDICAL DECISION MAKING:    Pertinent Labs & Imaging studies reviewed. (See chart for details)  ED Course as of Oct 19 2237   Tue Oct 19, 2021   1811 Patient is a 70-year-old woman with history of PE 2 months ago, on Xarelto, presents with 2 weeks of progressive exertional dyspnea.  No chest pain.  She is oxygenation of 94% on room air, rest comfortable sitting down.  Plan to screen for CHF, anemia, Covid.  She has been taking her Xarelto, recurrent PE is less likely.      1837 Spo2 dropped to 86% after walking around her room briefly      1839 EKG shows sinus rhythm with sinus arrhythmia, rate of 65.  No acute ischemic ST-T wave morphology.  No significant axis deviation, normal intervals.  When compared to prior EKG on May 16, 2010 there is no significant change.  Impression: Normal sinus rhythm, normal EKG.      1852 Hemoglobin(!): 11.5   1852 WBC: 8.2   1902 BNP: 50   1902 Troponin I: <0.01     I spoke to the pulmonologist Dr. Heard about the imaging.  We do not have any radiology reads due to technical issues, and he was able to identify multiple pulmonary emboli, but we do not have comparison images from August.  Is unclear exactly what is causing her more acute exertional dyspnea based on work-up so far.  No overt signs of heart failure, but she could have pulmonary hypertension, and there could be some issue with medication compliance from her Xarelto.  She is already taken a dose tonight.  She will be admitted for continued exertional hypoxia work-up.    Additional ED Course Timestamps:  6:03 PM Met with patient for  initial interview and exam. Discussed initial plan for care for their stay in the emergency department.  9:02 PM Consulted with Dr. Lala, intensivist.  9:08 PM Rechecked and updated patient, she is agreeable to admission.    At the conclusion of the encounter I discussed the results of all of the tests and the disposition. The questions were answered. The patient or family acknowledged understanding and was agreeable with the care plan.       MEDICATIONS GIVEN IN THE EMERGENCY:  Medications   iopamidol (ISOVUE-370) solution 100 mL (75 mLs Intravenous Given 10/19/21 1952)         NEW PRESCRIPTIONS STARTED AT TODAY'S ER VISIT  New Prescriptions    No medications on file          =================================================================    HPI    Patient information was obtained from: patient    Use of : N/A       Lori Liu is a 70 year old female with a pertinent history of PE, DVT, and GERD who presents to this ED via walk in for evaluation of shortness of breath.    Per chart review: Patient was admitted to the [facility] on 8/7/2021 after presenting with one week of worsening dyspnea and finding large bilateral Pes on CT with possible right heart strain. She was hemodynamically stable while in the hospital and on a heparin drip. She was transitioned to Xarelto 15 mg twice daily for 21 days then 20 mg daily. She was discharged on 8/9/2021.    Patient has been feeling shortness of breath with exertion, which she had a lot of in August when she was diagnosed with PE. She states this slowly improved until the past few weeks it has been worsening again. Today she was voluntering and had to sit down three times because she was out of breath just from standing and doing small tasks. No chest pain with these episodes. She is fully vaccinated against COVID with Moderna but has not had the booster yet. She notes a mild cough that has been present for months, and dark  stools which are common for her because she takes iron for baseline anemia. No fever, nausea, vomiting, diarrhea, or any other complaints.    REVIEW OF SYSTEMS   Review of Systems   Constitutional: Negative for fever.   Respiratory: Positive for cough (present for months) and shortness of breath (exertional).    Cardiovascular: Negative for chest pain.   Gastrointestinal: Positive for blood in stool (dark stools, baseline). Negative for diarrhea, nausea and vomiting.   All other systems reviewed and are negative.      PAST MEDICAL HISTORY:  Past Medical History:   Diagnosis Date     Anemia, iron deficiency      Gastroesophageal reflux disease with esophagitis without hemorrhage      Osteoarthritis of both knees        PAST SURGICAL HISTORY:  No past surgical history on file.        CURRENT MEDICATIONS:    No current facility-administered medications for this encounter.     Current Outpatient Medications   Medication     cimetidine (TAGAMET) 200 MG tablet     cyanocobalamin (VITAMIN B-12) 500 MCG tablet     furosemide (LASIX) 20 MG tablet     omeprazole (PRILOSEC) 20 MG DR capsule     rivaroxaban ANTICOAGULANT (XARELTO ANTICOAGULANT) 20 MG TABS tablet     VITRON-C  MG TABS tablet       ALLERGIES:  Allergies   Allergen Reactions     Cephalexin      Other reaction(s): Itching, itching, rash     Penicillins Hives and Itching       FAMILY HISTORY:  Family History   Problem Relation Age of Onset     Clotting Disorder Father         Does not recall exact issue with bleeding vs clotting disorder     Colon Cancer Mother        SOCIAL HISTORY:   Social History     Socioeconomic History     Marital status:      Spouse name: Not on file     Number of children: Not on file     Years of education: Not on file     Highest education level: Not on file   Occupational History     Not on file   Tobacco Use     Smoking status: Not on file   Substance and Sexual Activity     Alcohol use: Not on file     Drug use: Not on  "file     Sexual activity: Not on file   Other Topics Concern     Not on file   Social History Narrative     Not on file     Social Determinants of Health     Financial Resource Strain:      Difficulty of Paying Living Expenses:    Food Insecurity:      Worried About Running Out of Food in the Last Year:      Ran Out of Food in the Last Year:    Transportation Needs:      Lack of Transportation (Medical):      Lack of Transportation (Non-Medical):    Physical Activity:      Days of Exercise per Week:      Minutes of Exercise per Session:    Stress:      Feeling of Stress :    Social Connections:      Frequency of Communication with Friends and Family:      Frequency of Social Gatherings with Friends and Family:      Attends Zoroastrianism Services:      Active Member of Clubs or Organizations:      Attends Club or Organization Meetings:      Marital Status:    Intimate Partner Violence:      Fear of Current or Ex-Partner:      Emotionally Abused:      Physically Abused:      Sexually Abused:        VITALS:  BP (!) 150/69   Pulse 63   Temp 97.5  F (36.4  C) (Temporal)   Resp 23   Ht 1.651 m (5' 5\")   Wt 101.6 kg (224 lb)   SpO2 97%   BMI 37.28 kg/m      PHYSICAL EXAM    Constitutional: Well developed, well nourished. Comfortable appearing.  HENT: Normocephalic, atraumatic, mucous membranes moist, nose normal. Neck- Supple, gross ROM intact.   Eyes: Pupils mid-range, conjunctiva without injection, no discharge.   Respiratory: Clear to auscultation bilaterally, no respiratory distress, no wheezing, speaks full sentences easily. No cough.  Cardiovascular: Normal heart rate, regular rhythm, no murmurs. No pedal edema.   GI: Soft, no tenderness to deep palpation in all quadrants, no masses.  Musculoskeletal: Moving all 4 extremities intentionally and without pain. No obvious deformity.  Skin: Warm, dry, no rash.  Neurologic: Alert & oriented x 3, cranial nerves grossly intact.  Psychiatric: Affect normal, " cooperative.       LAB:  All pertinent labs reviewed and interpreted.  Labs Ordered and Resulted from Time of ED Arrival Up to the Time of Departure from the ED   CBC WITH PLATELETS - Abnormal; Notable for the following components:       Result Value    RBC Count 3.71 (*)     Hemoglobin 11.5 (*)     MCHC 31.3 (*)     RDW 17.9 (*)     All other components within normal limits   BASIC METABOLIC PANEL - Abnormal; Notable for the following components:    GFR Estimate 60 (*)     All other components within normal limits   B-TYPE NATRIURETIC PEPTIDE ( EAST ONLY) - Normal   PROCALCITONIN - Normal   TROPONIN I - Normal   INFLUENZA A/B & SARS-COV2 PCR MULTIPLEX   PERIPHERAL IV CATHETER   CALL       RADIOLOGY:  Reviewed all pertinent imaging. Please see official radiology report.  XR Chest Port 1 View   Preliminary Result   IMPRESSION: Cardiac enlargement. Pulmonary vascularity within normal limits. Subtle patchy hazy interstitial opacities in the midlungs could be seen with infiltrate or edema. Aortic calcification. No significant bony abnormalities.      CT Chest Pulmonary Embolism w Contrast    (Results Pending)       EKG:    All EKG interpretations will be found in ED course above.      I, Maryanne Cruz am serving as a scribe to document services personally performed by Dr. Manoj Sorenson based on my observation and the provider's statements to me. I, Manoj Sorenson MD attest that Maryanne Cruz is acting in a scribe capacity, has observed my performance of the services and has documented them in accordance with my direction.    Manoj Sorenson M.D.  Emergency Medicine  [facility] EMERGENCY ROOM  [address] [address]  [phone]  Dept: [phone]     Manoj Sorenson MD  10/19/21 8756

## 2021-10-20 ENCOUNTER — APPOINTMENT (OUTPATIENT)
Dept: CARDIOLOGY | Facility: CLINIC | Age: 71
End: 2021-10-20
Attending: INTERNAL MEDICINE
Payer: COMMERCIAL

## 2021-10-20 VITALS
BODY MASS INDEX: 37.32 KG/M2 | HEART RATE: 59 BPM | OXYGEN SATURATION: 93 % | WEIGHT: 224 LBS | SYSTOLIC BLOOD PRESSURE: 142 MMHG | TEMPERATURE: 97.5 F | HEIGHT: 65 IN | DIASTOLIC BLOOD PRESSURE: 86 MMHG | RESPIRATION RATE: 36 BRPM

## 2021-10-20 PROBLEM — R06.00 DYSPNEA: Status: ACTIVE | Noted: 2021-10-20

## 2021-10-20 LAB
ALBUMIN SERPL-MCNC: 3.1 G/DL (ref 3.5–5)
ALP SERPL-CCNC: 39 U/L (ref 45–120)
ALT SERPL W P-5'-P-CCNC: <9 U/L (ref 0–45)
ANION GAP SERPL CALCULATED.3IONS-SCNC: 10 MMOL/L (ref 5–18)
AST SERPL W P-5'-P-CCNC: 11 U/L (ref 0–40)
BASE EXCESS BLDA CALC-SCNC: 2.1 MMOL/L
BASOPHILS # BLD AUTO: 0 10E3/UL (ref 0–0.2)
BASOPHILS NFR BLD AUTO: 1 %
BILIRUB SERPL-MCNC: 0.6 MG/DL (ref 0–1)
BUN SERPL-MCNC: 11 MG/DL (ref 8–28)
CALCIUM SERPL-MCNC: 8.7 MG/DL (ref 8.5–10.5)
CHLORIDE BLD-SCNC: 106 MMOL/L (ref 98–107)
CO2 SERPL-SCNC: 23 MMOL/L (ref 22–31)
COHGB MFR BLD: 93.6 % (ref 95–96)
CREAT SERPL-MCNC: 0.82 MG/DL (ref 0.6–1.1)
EOSINOPHIL # BLD AUTO: 0.2 10E3/UL (ref 0–0.7)
EOSINOPHIL NFR BLD AUTO: 4 %
ERYTHROCYTE [DISTWIDTH] IN BLOOD BY AUTOMATED COUNT: 17.8 % (ref 10–15)
FLUAV RNA SPEC QL NAA+PROBE: NEGATIVE
FLUBV RNA RESP QL NAA+PROBE: NEGATIVE
GFR SERPL CREATININE-BSD FRML MDRD: 72 ML/MIN/1.73M2
GLUCOSE BLD-MCNC: 94 MG/DL (ref 70–125)
HCO3 BLD-SCNC: 26 MMOL/L (ref 23–29)
HCT VFR BLD AUTO: 34.2 % (ref 35–47)
HGB BLD-MCNC: 10.6 G/DL (ref 11.7–15.7)
IMM GRANULOCYTES # BLD: 0 10E3/UL
IMM GRANULOCYTES NFR BLD: 0 %
LVEF ECHO: NORMAL
LYMPHOCYTES # BLD AUTO: 1.1 10E3/UL (ref 0.8–5.3)
LYMPHOCYTES NFR BLD AUTO: 18 %
MCH RBC QN AUTO: 30.1 PG (ref 26.5–33)
MCHC RBC AUTO-ENTMCNC: 31 G/DL (ref 31.5–36.5)
MCV RBC AUTO: 97 FL (ref 78–100)
MONOCYTES # BLD AUTO: 0.5 10E3/UL (ref 0–1.3)
MONOCYTES NFR BLD AUTO: 8 %
NEUTROPHILS # BLD AUTO: 4.2 10E3/UL (ref 1.6–8.3)
NEUTROPHILS NFR BLD AUTO: 69 %
NRBC # BLD AUTO: 0 10E3/UL
NRBC BLD AUTO-RTO: 0 /100
O2/TOTAL GAS SETTING VFR VENT: 21 %
OXYHGB MFR BLD: 91.5 % (ref 95–96)
PCO2 BLD: 39 MM HG (ref 35–45)
PH BLD: 7.44 [PH] (ref 7.37–7.44)
PLATELET # BLD AUTO: 261 10E3/UL (ref 150–450)
PO2 BLD: 76 MM HG (ref 75–85)
POTASSIUM BLD-SCNC: 3.8 MMOL/L (ref 3.5–5)
PROCALCITONIN SERPL-MCNC: 0.04 NG/ML (ref 0–0.49)
PROT SERPL-MCNC: 6 G/DL (ref 6–8)
RBC # BLD AUTO: 3.52 10E6/UL (ref 3.8–5.2)
RSV RNA SPEC NAA+PROBE: NEGATIVE
SARS-COV-2 RNA RESP QL NAA+PROBE: NEGATIVE
SODIUM SERPL-SCNC: 139 MMOL/L (ref 136–145)
TEMPERATURE: 37 DEGREES C
TROPONIN I SERPL-MCNC: <0.01 NG/ML (ref 0–0.29)
TROPONIN T BLD-MCNC: 0.09 UG/L
WBC # BLD AUTO: 6.1 10E3/UL (ref 4–11)

## 2021-10-20 PROCEDURE — 82805 BLOOD GASES W/O2 SATURATION: CPT | Performed by: INTERNAL MEDICINE

## 2021-10-20 PROCEDURE — 255N000002 HC RX 255 OP 636: Performed by: INTERNAL MEDICINE

## 2021-10-20 PROCEDURE — 36415 COLL VENOUS BLD VENIPUNCTURE: CPT | Performed by: INTERNAL MEDICINE

## 2021-10-20 PROCEDURE — 84484 ASSAY OF TROPONIN QUANT: CPT | Performed by: INTERNAL MEDICINE

## 2021-10-20 PROCEDURE — 93306 TTE W/DOPPLER COMPLETE: CPT | Mod: 26 | Performed by: INTERNAL MEDICINE

## 2021-10-20 PROCEDURE — 36600 WITHDRAWAL OF ARTERIAL BLOOD: CPT

## 2021-10-20 PROCEDURE — 85025 COMPLETE CBC W/AUTO DIFF WBC: CPT | Performed by: INTERNAL MEDICINE

## 2021-10-20 PROCEDURE — 82040 ASSAY OF SERUM ALBUMIN: CPT | Performed by: INTERNAL MEDICINE

## 2021-10-20 PROCEDURE — 999N000208 ECHOCARDIOGRAM COMPLETE

## 2021-10-20 PROCEDURE — 99217 PR OBSERVATION CARE DISCHARGE: CPT | Performed by: INTERNAL MEDICINE

## 2021-10-20 PROCEDURE — G0378 HOSPITAL OBSERVATION PER HR: HCPCS

## 2021-10-20 PROCEDURE — 84145 PROCALCITONIN (PCT): CPT | Performed by: INTERNAL MEDICINE

## 2021-10-20 PROCEDURE — 250N000013 HC RX MED GY IP 250 OP 250 PS 637: Performed by: INTERNAL MEDICINE

## 2021-10-20 RX ORDER — FUROSEMIDE 20 MG
20 TABLET ORAL DAILY
Status: DISCONTINUED | OUTPATIENT
Start: 2021-10-20 | End: 2021-10-20 | Stop reason: HOSPADM

## 2021-10-20 RX ORDER — LIDOCAINE 40 MG/G
CREAM TOPICAL
Status: DISCONTINUED | OUTPATIENT
Start: 2021-10-20 | End: 2021-10-20 | Stop reason: HOSPADM

## 2021-10-20 RX ADMIN — PERFLUTREN 2 ML: 6.52 INJECTION, SUSPENSION INTRAVENOUS at 10:55

## 2021-10-20 RX ADMIN — FUROSEMIDE 20 MG: 20 TABLET ORAL at 09:10

## 2021-10-20 ASSESSMENT — ACTIVITIES OF DAILY LIVING (ADL)
ADLS_ACUITY_SCORE: 4
ADLS_ACUITY_SCORE: 4

## 2021-10-20 NOTE — DISCHARGE SUMMARY
[facility] MEDICINE  DISCHARGE SUMMARY     Primary Care Physician: Angelika Haney  Admission Date: 10/19/2021   Discharge Provider: Mayelin Omer MD Discharge Date: 10/20/2021   Diet: Orders Placed This Encounter      Combination Diet Low Saturated Fat Na <2400mg Diet, No Caffeine Diet      Diet      Code Status: Full Code   Activity: activity as tolerated   [facility]      Condition at Discharge: Stable      REASON FOR PRESENTATION(See Admission Note for Details)   Shortness of breath    PRINCIPAL & ACTIVE DISCHARGE DIAGNOSES     Active Problems:  Small pulmonary embolism could be chronic  Obesity  History of PE in August 2021  Large hiatal hernia  Patchy opacities in the lungs follow-up as outpatient.    SIGNIFICANT FINDINGS (Imaging, labs):   Echocardiogram  The visual ejection fraction is 60-65%.   The left ventricle is normal in size.   Normal left ventricular wall motion   The left atrium is moderately dilated.   Right ventricular diastolic pressure is approximated at 7mmHg plus the right   atrial pressure.   Compared to echo from 8/7/21 both LV and RV systolic function have improved.     CT PE run  IMPRESSION:   1.  Exam is positive for a small amount of subsegmental pulmonary emboli to the right lower lobe. No prior studies are available for comparison in this patient with history of recent PE and on blood thinners.     2.  Patchy subpleural hazy linear opacities in the periphery of the lungs. Appearance is suggestive of some degree of underlying interstitial fibrotic change with the possibility of a mild or low-grade superimposed pneumonitis not excluded. Clinical   correlation. Follow-up CT of the chest using high resolution imaging could be obtained once the patient's acute episode resolves for more accurate evaluation of the lung findings.     3.  Cardiac enlargement. No pleural effusions.     4.  Evidence of prior granulomatous disease.     5.  Large hiatal  hernia.       PENDING LABS         PROCEDURES ( this hospitalization only)          RECOMMENDATION FOR F/U VISIT       DISPOSITION     Home    SUMMARY OF HOSPITAL COURSE:    70-year-old lady past medical history significant for obesity,Recurrent pulmonary embolism on Xarelto most recently she had submassive PE in August 2021 RV dysfunction, presented to the emergency room with complaints of exertional shortness of breath worsened over the last 2 weeks from her baseline.  Denied any fevers any chills, cough, chest pain.  CT PE run showed small PE in right lower lobe.  He was hemodynamically stable.  She was on room air.  Continued on Xarelto.  Case was discussed with pulmonary.  Gilman that this could be chronic PE.  Echocardiogram showed improved RV function.  She is discharged home in stable condition.  CT scan also showed a large hiatal hernia, patchy opacities, further work-up is recommended as outpatient.  No sign of infection.  She is discharged home with plans to follow-up with pulmonary.    Discharge Medications with Med changes:        Review of your medicines      CONTINUE these medicines which have NOT CHANGED      Dose / Directions   cimetidine 200 MG tablet  Commonly known as: TAGAMET      Dose: 200 mg  Take 200 mg by mouth daily  Refills: 0     cyanocobalamin 500 MCG tablet  Commonly known as: VITAMIN B-12      Dose: 1,000 mcg  Take 1,000 mcg by mouth daily  Refills: 0     furosemide 20 MG tablet  Commonly known as: LASIX      Dose: 20 mg  Take 20 mg by mouth daily  Refills: 0     omeprazole 20 MG DR capsule  Commonly known as: priLOSEC      Dose: 20 mg  Take 20 mg by mouth daily  Refills: 0     Vitron-C  MG Tabs tablet  Generic drug: ferrous fumarate 65 mg ([place]. FE)-Vitamin C 125 mg      Dose: 1 tablet  Take 1 tablet by mouth daily  Refills: 0     XARELTO ANTICOAGULANT 20 MG Tabs tablet  Generic drug: rivaroxaban ANTICOAGULANT      Dose: 20 mg  Take 20 mg by mouth daily (with dinner)  Refills: 0    "              Rationale for medication changes:            Consults         Immunizations given this encounter         Discharge Procedure Orders   Reason for your hospital stay   Order Comments: Pulmonary embolism which is chronic     Follow-up and recommended labs and tests   Order Comments: Follow up with primary care provider, Angelika Haney, within 7 days for hospital follow- up.  Follow up with pulmonary as recommended     Activity   Order Comments: Your activity upon discharge: activity as tolerated     Order Specific Question Answer Comments   Is discharge order? Yes      Diet   Order Comments: Follow this diet upon discharge: Orders Placed This Encounter      Combination Diet Low Saturated Fat Na <2400mg Diet, No Caffeine Diet       Order Specific Question Answer Comments   Is discharge order? Yes      Examination     Vital Signs in last 24 hours:   Vital signs:  Temp: 97.5  F (36.4  C) Temp src: Temporal BP: 131/78 Pulse: 63   Resp: (!) 32 SpO2: 94 %     Height: 165.1 cm (5' 5\") Weight: 101.6 kg (224 lb)  Estimated body mass index is 37.28 kg/m  as calculated from the following:    Height as of this encounter: 1.651 m (5' 5\").    Weight as of this encounter: 101.6 kg (224 lb).       Pertinent positives on exam:  Lungs: clear to auscultation bilaterally    Please see EMR for more detailed significant labs, imaging, consultant notes etc.  Total time spent on discharge: > 35 minutes    Mayelin Omer MD   [facility] Hospitalist Service: Ph:[phone]    CC:Angelika Haney      "

## 2021-10-20 NOTE — UTILIZATION REVIEW
"Admission Status; Secondary Review Determination     Under the authority of the Utilization Management Committee, the utilization review process indicated a secondary review on Lori Liu.  The review outcome is based on review of the medical records, discussions with staff, and applying clinical experience noted on the date of the review.     (x) Observation Status Appropriate - This patient does not meet hospital inpatient criteria and is placed in observation status. If this patient's primary payer is Medicare and was admitted as an inpatient, Condition Code 44 should be used and patient status changed to \"observation\".     RATIONALE FOR DETERMINATION   70 yr old female with hx PE still on xarelto presented with exertional dyspnea.  No hypoxia.  Small PE on imaging however no strain and ECHO improved from 8/21 (last PE).      The severity of illness, intensity of service provided, expected LOS and risk for adverse outcome make the care appropriate for further observation; however, doesn't meet criteria for hospital inpatient admission. Dr Omer notified of this determination and agrees with downgrade of status.      The information on this document is developed by the utilization review team in order for the business office to ensure compliance.  This only denotes the appropriateness of proper admission status and does not reflect the quality of care rendered.         The definitions of Inpatient Status and Observation Status used in making the determination above are those provided in the CMS Coverage Manual, Chapter 1 and Chapter 6, section 70.4.      Sincerely,  Paula Barrientos MD  Utilization Review  Physician Advisor  [facility]    "

## 2021-10-20 NOTE — H&P
[facility] MEDICINE ADMISSION HISTORY AND PHYSICAL       Assessment & Plan      1. 2 weeks of worsening exertional dyspnea -- Unclear etiology, however, still on xarelto for PE diagnosed last 8/2021 and that time she had --  Acute large bilateral PEs w/ septal flattening and RV strain. No lytics given that time. Her ECHO showed decreased LV function, EF 50-55%, mild-moderate RV dilation, and reduced global RV function.  She has DVT on right LE.    Her PE study today showed small PE. Her BNP and Trop were negative    She has BMI of 37-38, no formal diagnosis of SHASHI.    She did smoker years ago, no diagnosis of COPD     Currently O2 sat is OK at rest 94-97%     - will continue her xarelto, will prob need to repeat ECHO and see if she has developed right sided CHF.   - will consider consulting pulmonary service - for exertional SOB  - tele and pulse ox  - continue serial trops  - she may need stress test at some point   - check ABG       VTE prophylaxis:  on xarelto   IV fluids: Per order set   Diet:  Cardiac   GI prophylaxis: Not indicated at this point, re-assess if needed.   Pain, sleep, and vomiting medications: Per order set  Code Status: Full  COVID test result:  Pending   COVID vaccination: completed   Barriers to discharge: admitting clinical condition  Discharge Disposition and goals:  Unable to determine at this point, pending clinical progress and response to treatment. Patient may need transfer to SNF or ACR if unsafe to go home and needed treatment inappropriate at home setting OR may need home health care evaluation if care can be delivered at home settings. Consider referral to care manager/        Care plan was created based on available information provided, including patient's condition at the time of encounter.   This plan was discussed with patient and/or family members using layman's terms and have agreed to proceed.   At the end of night shift (9PM - 730A),  this case will be presented to the AM Hospitalist.    It is recommended to revise care plan if there is change in condition and/or new clinical information that are not available during my encounter.     All or some of home medication/s were not resumed on admission due to safety reasons or contraindications. Dosing and frequency may also have been modified. Please resume/review them during your visit.     70 minutes of total visit duration and greater than 50% was spent in direct evaluation of patient and coordination of care including discussion of diagnostic test results and recommended treatment. .      Antoni Osorio MD, MPH, FACP, [facility]  Internal Medicine - Hospitalist        Chief Complaint SOB      HISTORY     - Met her in the ED. She was awake and alert. He O2 sat was 94-96% on RA.  - She came in because of SOB. It seems exertional and was on for 2 weeks now. Worse when climbing stairs and sometimes just talking on the phone, gets SOB.  - No cough concerning for infection. No chest pain. No abdominal pain. No diarrhea. No urinary symptoms.  - She has PE diagnosed last 8/2021 and she has been on anticoagulation   - In the ED, PE study was done and showed small PE.     - She was admitted at the [facility] when she was diagnosed and showed -- Acute large bilateral PEs w/ septal flattening and RV strain. No lytics given that time. Her ECHO showed decreased LV function, EF 50-55%, mild-moderate RV dilation, and reduced global RV function.  She has DVT on right LE.    - ROS --- No headache. No dizziness. No weakness. No palpitations. No abdominal pain. No nausea or vomiting. No urinary symptoms. No bleeding symptoms. No weight loss. Rest of 12 point ROS was reviewed and negative.       Past Medical History     Past Medical History:   Diagnosis Date     Anemia, iron deficiency      Gastroesophageal reflux disease with esophagitis without hemorrhage      Osteoarthritis of both knees          Surgical History     Knee  injection     Family History      Family History   Problem Relation Age of Onset     Clotting Disorder Father         Does not recall exact issue with bleeding vs clotting disorder     Colon Cancer Mother          Social History      .  Social History     Socioeconomic History     Marital status:      Spouse name: Not on file     Number of children: Not on file     Years of education: Not on file     Highest education level: Not on file   Occupational History     Not on file   Tobacco Use     Smoking status: Not on file   Substance and Sexual Activity     Alcohol use: Not on file     Drug use: Not on file     Sexual activity: Not on file   Other Topics Concern     Not on file   Social History Narrative     Not on file     Social Determinants of Health     Financial Resource Strain:      Difficulty of Paying Living Expenses:    Food Insecurity:      Worried About Running Out of Food in the Last Year:      Ran Out of Food in the Last Year:    Transportation Needs:      Lack of Transportation (Medical):      Lack of Transportation (Non-Medical):    Physical Activity:      Days of Exercise per Week:      Minutes of Exercise per Session:    Stress:      Feeling of Stress :    Social Connections:      Frequency of Communication with Friends and Family:      Frequency of Social Gatherings with Friends and Family:      Attends [employer] Services:      Active Member of Clubs or Organizations:      Attends Club or Organization Meetings:      Marital Status:    Intimate Partner Violence:      Fear of Current or Ex-Partner:      Emotionally Abused:      Physically Abused:      Sexually Abused:           Allergies        Allergies   Allergen Reactions     Cephalexin      Other reaction(s): Itching, itching, rash     Penicillins Hives and Itching         Prior to Admission Medications      No current facility-administered medications on file prior to encounter.  cyanocobalamin (VITAMIN B-12) 500 MCG tablet, Take 1,000 mcg  "by mouth daily  furosemide (LASIX) 20 MG tablet, Take 20 mg by mouth daily  omeprazole (PRILOSEC) 20 MG DR capsule, Take 20 mg by mouth daily  rivaroxaban ANTICOAGULANT (XARELTO ANTICOAGULANT) 20 MG TABS tablet, Take 20 mg by mouth daily (with dinner)  VITRON-C  MG TABS tablet, Take 1 tablet by mouth daily  famotidine (PEPCID) 40 MG tablet, Take 20 mg by mouth daily            Review of Systems     A 12 point comprehensive review of systems was negative except as noted above in HPI.    PHYSICAL EXAMINATION       Vitals      Vitals: BP (!) 169/98   Pulse 78   Temp 97.5  F (36.4  C) (Temporal)   Resp 24   Ht 1.651 m (5' 5\")   Wt 101.6 kg (224 lb)   SpO2 94%   BMI 37.28 kg/m    BMI= Body mass index is 37.28 kg/m .      Examination     General Appearance:  Alert, cooperative, no distress  Head:    Normocephalic, without obvious abnormality, atraumatic  EENT:  PERRL, conjunctiva/corneas clear, EOM's intact.   Neck:   Supple, symmetrical, trachea midline, no adenopathy; no NVE  Back:  Symmetric, no curvature, no CVA tenderness  Chest/Lungs: Clear to auscultation bilaterally, respirations unlabored, No tenderness or deformity. No abdominal breathing or use of accessory muscles.   Heart:    Regular rate and rhythm, S1 and S2 normal, no murmur, rub   or gallop  Abdomen: Soft, non-tender, bowel sounds active all four quadrants, not peritoneal on palpation. Not distended  Extremities:  Extremities normal, atraumatic, no swelling   Skin:  Skin color, texture, turgor normal, no rashes or lesion  Neurologic:  Awake and alert,  No lateralizing or localizing signs      Pertinent Lab     Results for orders placed or performed during the hospital encounter of 10/19/21   CBC (+ platelets, no diff)   Result Value Ref Range    WBC Count 8.2 4.0 - 11.0 10e3/uL    RBC Count 3.71 (L) 3.80 - 5.20 10e6/uL    Hemoglobin 11.5 (L) 11.7 - 15.7 g/dL    Hematocrit 36.7 35.0 - 47.0 %    MCV 99 78 - 100 fL    MCH 31.0 26.5 - 33.0 pg    " MCHC 31.3 (L) 31.5 - 36.5 g/dL    RDW 17.9 (H) 10.0 - 15.0 %    Platelet Count 292 150 - 450 10e3/uL   Basic metabolic panel   Result Value Ref Range    Sodium 140 136 - 145 mmol/L    Potassium 4.0 3.5 - 5.0 mmol/L    Chloride 106 98 - 107 mmol/L    Carbon Dioxide (CO2) 25 22 - 31 mmol/L    Anion Gap 9 5 - 18 mmol/L    Urea Nitrogen 11 8 - 28 mg/dL    Creatinine 0.95 0.60 - 1.10 mg/dL    Calcium 9.5 8.5 - 10.5 mg/dL    Glucose 89 70 - 125 mg/dL    GFR Estimate 60 (L) >60 mL/min/1.73m2   B-Type Natriuretic Peptide ([facility] Only)   Result Value Ref Range    BNP 50 0 - 123 pg/mL   Result Value Ref Range    Procalcitonin 0.04 0.00 - 0.49 ng/mL   Troponin I (now)   Result Value Ref Range    Troponin I <0.01 0.00 - 0.29 ng/mL   ECG 12-LEAD WITH MUSE ([facility])   Result Value Ref Range    Systolic Blood Pressure 139 mmHg    Diastolic Blood Pressure 89 mmHg    Ventricular Rate 65 BPM    Atrial Rate 65 BPM    CO Interval 198 ms    QRS Duration 96 ms     ms    QTc 459 ms    P Axis 44 degrees    R AXIS -23 degrees    T Axis 27 degrees    Interpretation ECG       Sinus rhythm with sinus arrhythmia  Normal ECG  When compared with ECG of 16-MAY-2010 08:55,  No significant change was found  Confirmed by SEE ED PROVIDER NOTE FOR, ECG INTERPRETATION ([id number]),  SOL ARECHIGA ([id number]) on 10/19/2021 8:40:14 PM             Pertinent Radiology

## 2021-10-20 NOTE — PROGRESS NOTES
CM was called to give a What is Obs to the Pt at 3:30PM by NATALIA Joy. CM went down to the ED to deliver the form as needed and the Pt had discharged before 3:36 PM when the CM went to see the Pt. CM confirmed this with the HUC at the ED Desk.

## 2021-10-20 NOTE — PHARMACY-ADMISSION MEDICATION HISTORY
Pharmacy Note - Admission Medication History    Pertinent Provider Information: none     ______________________________________________________________________    Prior To Admission (PTA) med list completed and updated in EMR.       PTA Med List   Medication Sig Note Last Dose     cimetidine (TAGAMET) 200 MG tablet Take 200 mg by mouth daily 10/19/2021: Once finished, will switch to omeprazole.  Patient prescribed cimetidine twice daily but only taking 1 tab once daily, strength unknown.  10/19/2021 at Unknown time     cyanocobalamin (VITAMIN B-12) 500 MCG tablet Take 1,000 mcg by mouth daily  10/19/2021 at Unknown time     furosemide (LASIX) 20 MG tablet Take 20 mg by mouth daily  10/19/2021 at Unknown time     omeprazole (PRILOSEC) 20 MG DR capsule Take 20 mg by mouth daily 10/19/2021: Has not started, finishing up cimetidine.  To replace cimetidine, hasn't switched yet.     rivaroxaban ANTICOAGULANT (XARELTO ANTICOAGULANT) 20 MG TABS tablet Take 20 mg by mouth daily (with dinner) 10/19/2021: 20 mg dose renewed 9/23/21 x 60 days.  10/19/2021 at evening     VITRON-C  MG TABS tablet Take 1 tablet by mouth daily  10/19/2021 at Unknown time       Information source(s): Patient and CareEverywhere/[facility]  Method of interview communication: phone    Summary of Changes to PTA Med List  New: omeprazole (to start soon), cimetidine (to finish soon), vit C, vit B 12, Xarelto 20 mg-renewed 9/23/21  Discontinued: Xarelto 15 mg, Xarelto 20 mg -ended 9/30/21, famotidine  Changed: none    Patient was asked about OTC/herbal products specifically.  PTA med list reflects this.    In the past week, patient estimated taking medication this percent of the time:  greater than 90%.    Allergies were reviewed, assessed, and updated with the patient.      Patient does not use any multi-dose medications prior to admission.    The information provided in this note is only as accurate as the sources available at the time of the  update(s).    Thank you for the opportunity to participate in the care of this patient.    Cheryl Mooney [facility]  10/19/2021 9:33 PM

## 2021-10-29 ENCOUNTER — LAB REQUISITION (OUTPATIENT)
Dept: LAB | Facility: CLINIC | Age: 71
End: 2021-10-29
Payer: COMMERCIAL

## 2021-10-29 DIAGNOSIS — E53.8 DEFICIENCY OF OTHER SPECIFIED B GROUP VITAMINS: ICD-10-CM

## 2021-10-29 LAB — VIT B12 SERPL-MCNC: 357 PG/ML (ref 213–816)

## 2021-10-29 PROCEDURE — 82607 VITAMIN B-12: CPT | Mod: ORL | Performed by: PHYSICIAN ASSISTANT

## 2022-01-25 DIAGNOSIS — R06.02 SHORTNESS OF BREATH: Primary | ICD-10-CM

## 2022-01-26 ENCOUNTER — HOSPITAL ENCOUNTER (OUTPATIENT)
Dept: RESPIRATORY THERAPY | Facility: CLINIC | Age: 72
Discharge: HOME | End: 2022-01-26
Attending: INTERNAL MEDICINE | Admitting: INTERNAL MEDICINE
Payer: COMMERCIAL

## 2022-01-26 ENCOUNTER — MEDICAL CORRESPONDENCE (OUTPATIENT)
Dept: HEALTH INFORMATION MANAGEMENT | Facility: CLINIC | Age: 72
End: 2022-01-26

## 2022-01-26 DIAGNOSIS — R06.02 SHORTNESS OF BREATH: ICD-10-CM

## 2022-01-26 PROCEDURE — 94729 DIFFUSING CAPACITY: CPT

## 2022-01-26 PROCEDURE — 94010 BREATHING CAPACITY TEST: CPT

## 2022-01-26 PROCEDURE — 999N000157 HC STATISTIC RCP TIME EA 10 MIN

## 2022-01-26 PROCEDURE — 94726 PLETHYSMOGRAPHY LUNG VOLUMES: CPT

## 2022-01-26 NOTE — PROGRESS NOTES
PFT Note:     Pt completed pulmonary function testing with DLCO.  Good Pt effort and cooperation.     Spirometry  Meets all ATS recommendations.     Plethysmography  All plethysmographic measurements meet ATS recommendations.    DLCO  Meets all ATS recommendations.  DLCO is an average of 2 maneuvers.  No recent Hgb for DLCO correction.  She was directed to lab to have it checked but at this time it remains not done.    January 26, 2022.11:12 AM  Ba Doty RT

## 2022-01-28 LAB
DLCOUNC-%PRED-PRE: 65 %
DLCOUNC-PRE: 13.09 ML/MIN/MMHG
DLCOUNC-PRED: 19.98 ML/MIN/MMHG
ERV-%PRED-PRE: 191 %
ERV-PRE: 0.52 L
ERV-PRED: 0.27 L
EXPTIME-PRE: 6.21 SEC
FEF2575-%PRED-PRE: 97 %
FEF2575-PRE: 1.79 L/SEC
FEF2575-PRED: 1.84 L/SEC
FEFMAX-%PRED-PRE: 83 %
FEFMAX-PRE: 4.73 L/SEC
FEFMAX-PRED: 5.65 L/SEC
FEV1-%PRED-PRE: 78 %
FEV1-PRE: 1.74 L
FEV1FEV6-PRE: 82 %
FEV1FEV6-PRED: 79 %
FEV1FVC-PRE: 81 %
FEV1FVC-PRED: 78 %
FEV1SVC-PRE: 77 %
FEV1SVC-PRED: 71 %
FIFMAX-PRE: 3.07 L/SEC
FRCPLETH-%PRED-PRE: 63 %
FRCPLETH-PRE: 1.75 L
FRCPLETH-PRED: 2.77 L
FVC-%PRED-PRE: 74 %
FVC-PRE: 2.14 L
FVC-PRED: 2.89 L
IC-%PRED-PRE: 59 %
IC-PRE: 1.72 L
IC-PRED: 2.87 L
RVPLETH-%PRED-PRE: 57 %
RVPLETH-PRE: 1.23 L
RVPLETH-PRED: 2.14 L
TLCPLETH-%PRED-PRE: 67 %
TLCPLETH-PRE: 3.47 L
TLCPLETH-PRED: 5.11 L
VA-%PRED-PRE: 63 %
VA-PRE: 3.11 L
VC-%PRED-PRE: 71 %
VC-PRE: 2.24 L
VC-PRED: 3.15 L

## 2022-07-27 DIAGNOSIS — J44.9 CHRONIC OBSTRUCTIVE PULMONARY DISEASE (COPD) (H): Primary | ICD-10-CM

## 2022-07-28 ENCOUNTER — LAB (OUTPATIENT)
Dept: LAB | Facility: CLINIC | Age: 72
End: 2022-07-28
Attending: INTERNAL MEDICINE
Payer: COMMERCIAL

## 2022-07-28 ENCOUNTER — HOSPITAL ENCOUNTER (OUTPATIENT)
Dept: RESPIRATORY THERAPY | Facility: CLINIC | Age: 72
Discharge: HOME | End: 2022-07-28
Attending: INTERNAL MEDICINE
Payer: COMMERCIAL

## 2022-07-28 DIAGNOSIS — J44.9 CHRONIC OBSTRUCTIVE PULMONARY DISEASE (COPD) (H): ICD-10-CM

## 2022-07-28 DIAGNOSIS — Z86.711 PERSONAL HISTORY OF PE (PULMONARY EMBOLISM): ICD-10-CM

## 2022-07-28 LAB — HGB BLD-MCNC: 14.9 G/DL (ref 11.7–15.7)

## 2022-07-28 PROCEDURE — 94726 PLETHYSMOGRAPHY LUNG VOLUMES: CPT

## 2022-07-28 PROCEDURE — 94729 DIFFUSING CAPACITY: CPT

## 2022-07-28 PROCEDURE — 94060 EVALUATION OF WHEEZING: CPT

## 2022-07-28 PROCEDURE — 85018 HEMOGLOBIN: CPT

## 2022-07-28 PROCEDURE — 36415 COLL VENOUS BLD VENIPUNCTURE: CPT

## 2022-07-28 PROCEDURE — 250N000009 HC RX 250

## 2022-07-28 RX ORDER — ALBUTEROL SULFATE 0.83 MG/ML
SOLUTION RESPIRATORY (INHALATION)
Status: COMPLETED
Start: 2022-07-28 | End: 2022-07-28

## 2022-07-28 RX ADMIN — ALBUTEROL SULFATE: 2.5 SOLUTION RESPIRATORY (INHALATION) at 14:11

## 2022-07-28 NOTE — PROGRESS NOTES
Patient completed pulmonary function testing with pre/post spirometry, lung volumes and diffusion. Good patient effort and cooperation. The results of this test met the ATS standards for acceptability and repeatability. Albuterol neb 2.5 mg given for bronchial dilation.

## 2022-08-01 LAB
DLCOCOR-%PRED-PRE: 60 %
DLCOCOR-PRE: 11.24 ML/MIN/MMHG
DLCOUNC-%PRED-PRE: 62 %
DLCOUNC-PRE: 11.73 ML/MIN/MMHG
DLCOUNC-PRED: 18.67 ML/MIN/MMHG
ERV-%PRED-PRE: 320 %
ERV-PRE: 0.36 L
ERV-PRED: 0.11 L
EXPTIME-PRE: 5.82 SEC
FEF2575-%PRED-POST: 162 %
FEF2575-%PRED-PRE: 147 %
FEF2575-POST: 2.87 L/SEC
FEF2575-PRE: 2.6 L/SEC
FEF2575-PRED: 1.76 L/SEC
FEFMAX-%PRED-PRE: 100 %
FEFMAX-PRE: 5.39 L/SEC
FEFMAX-PRED: 5.34 L/SEC
FEV1-%PRED-PRE: 90 %
FEV1-PRE: 1.89 L
FEV1FEV6-PRE: 86 %
FEV1FEV6-PRED: 79 %
FEV1FVC-PRE: 86 %
FEV1FVC-PRED: 78 %
FEV1SVC-PRE: 84 %
FEV1SVC-PRED: 72 %
FIFMAX-PRE: 4.24 L/SEC
FRCPLETH-%PRED-PRE: 61 %
FRCPLETH-PRE: 1.64 L
FRCPLETH-PRED: 2.66 L
FVC-%PRED-PRE: 81 %
FVC-PRE: 2.18 L
FVC-PRED: 2.69 L
GAW-%PRED-PRE: 67 %
GAW-PRE: 0.7 L/S/CMH2O
GAW-PRED: 1.03 L/S/CMH2O
IC-%PRED-PRE: 67 %
IC-PRE: 1.89 L
IC-PRED: 2.78 L
RVPLETH-%PRED-PRE: 62 %
RVPLETH-PRE: 1.28 L
RVPLETH-PRED: 2.05 L
SGAW-%PRED-PRE: 382 %
SGAW-PRE: 0.39 1/CMH2O*S
SGAW-PRED: 0.1 1/CMH2O*S
SRAW-%PRED-PRE: 53 %
SRAW-PRE: 2.57 CMH2O*S
SRAW-PRED: 4.76 CMH2O*S
TLCPLETH-%PRED-PRE: 73 %
TLCPLETH-PRE: 3.53 L
TLCPLETH-PRED: 4.77 L
VA-%PRED-PRE: 69 %
VA-PRE: 3.12 L
VC-%PRED-PRE: 77 %
VC-PRE: 2.24 L
VC-PRED: 2.89 L

## 2022-10-10 ENCOUNTER — LAB REQUISITION (OUTPATIENT)
Dept: LAB | Facility: CLINIC | Age: 72
End: 2022-10-10
Payer: COMMERCIAL

## 2022-10-10 DIAGNOSIS — Z86.711 PERSONAL HISTORY OF PULMONARY EMBOLISM: ICD-10-CM

## 2022-10-10 LAB
ANION GAP SERPL CALCULATED.3IONS-SCNC: 9 MMOL/L (ref 7–15)
BUN SERPL-MCNC: 10.9 MG/DL (ref 8–23)
CALCIUM SERPL-MCNC: 9.1 MG/DL (ref 8.8–10.2)
CHLORIDE SERPL-SCNC: 104 MMOL/L (ref 98–107)
CREAT SERPL-MCNC: 0.78 MG/DL (ref 0.51–0.95)
DEPRECATED HCO3 PLAS-SCNC: 26 MMOL/L (ref 22–29)
GFR SERPL CREATININE-BSD FRML MDRD: 80 ML/MIN/1.73M2
GLUCOSE SERPL-MCNC: 84 MG/DL (ref 70–99)
POTASSIUM SERPL-SCNC: 4.3 MMOL/L (ref 3.4–5.3)
SODIUM SERPL-SCNC: 139 MMOL/L (ref 136–145)

## 2022-10-10 PROCEDURE — 80048 BASIC METABOLIC PNL TOTAL CA: CPT | Mod: ORL | Performed by: STUDENT IN AN ORGANIZED HEALTH CARE EDUCATION/TRAINING PROGRAM

## 2022-12-06 ENCOUNTER — HOSPITAL ENCOUNTER (OUTPATIENT)
Dept: NUCLEAR MEDICINE | Facility: CLINIC | Age: 72
Discharge: HOME | End: 2022-12-06
Attending: PHYSICIAN ASSISTANT
Payer: COMMERCIAL

## 2022-12-06 ENCOUNTER — HOSPITAL ENCOUNTER (OUTPATIENT)
Dept: CARDIOLOGY | Facility: CLINIC | Age: 72
Discharge: HOME | End: 2022-12-06
Attending: PHYSICIAN ASSISTANT
Payer: COMMERCIAL

## 2022-12-06 DIAGNOSIS — R06.02 CHRONIC SHORTNESS OF BREATH: ICD-10-CM

## 2022-12-06 LAB
CV STRESS CURRENT BP HE: NORMAL
CV STRESS CURRENT HR HE: 54
CV STRESS CURRENT HR HE: 61
CV STRESS CURRENT HR HE: 69
CV STRESS CURRENT HR HE: 71
CV STRESS CURRENT HR HE: 72
CV STRESS CURRENT HR HE: 72
CV STRESS CURRENT HR HE: 74
CV STRESS CURRENT HR HE: 76
CV STRESS CURRENT HR HE: 76
CV STRESS CURRENT HR HE: 78
CV STRESS CURRENT HR HE: 81
CV STRESS CURRENT HR HE: 82
CV STRESS CURRENT HR HE: 88
CV STRESS CURRENT HR HE: 90
CV STRESS DEVIATION TIME HE: NORMAL
CV STRESS ECHO PERCENT HR HE: NORMAL
CV STRESS EXERCISE STAGE HE: NORMAL
CV STRESS FINAL RESTING BP HE: NORMAL
CV STRESS FINAL RESTING HR HE: 69
CV STRESS MAX HR HE: 92
CV STRESS MAX TREADMILL GRADE HE: 0
CV STRESS MAX TREADMILL SPEED HE: 0
CV STRESS PEAK DIA BP HE: NORMAL
CV STRESS PEAK SYS BP HE: NORMAL
CV STRESS PHASE HE: NORMAL
CV STRESS PROTOCOL HE: NORMAL
CV STRESS RESTING PT POSITION HE: NORMAL
CV STRESS ST DEVIATION AMOUNT HE: NORMAL
CV STRESS ST DEVIATION ELEVATION HE: NORMAL
CV STRESS ST EVELATION AMOUNT HE: NORMAL
CV STRESS TEST TYPE HE: NORMAL
CV STRESS TOTAL STAGE TIME MIN 1 HE: NORMAL
RATE PRESSURE PRODUCT: NORMAL
STRESS ECHO BASELINE DIASTOLIC HE: 96
STRESS ECHO BASELINE HR: 52
STRESS ECHO BASELINE SYSTOLIC BP: 168
STRESS ECHO CALCULATED PERCENT HR: 63 %
STRESS ECHO LAST STRESS DIASTOLIC BP: 97
STRESS ECHO LAST STRESS HR: 76
STRESS ECHO LAST STRESS SYSTOLIC BP: 180
STRESS ECHO TARGET HR: 147

## 2022-12-06 PROCEDURE — 78452 HT MUSCLE IMAGE SPECT MULT: CPT | Mod: 26 | Performed by: INTERNAL MEDICINE

## 2022-12-06 PROCEDURE — 93016 CV STRESS TEST SUPVJ ONLY: CPT | Performed by: INTERNAL MEDICINE

## 2022-12-06 PROCEDURE — 343N000001 HC RX 343: Performed by: PHYSICIAN ASSISTANT

## 2022-12-06 PROCEDURE — 78452 HT MUSCLE IMAGE SPECT MULT: CPT

## 2022-12-06 PROCEDURE — 93017 CV STRESS TEST TRACING ONLY: CPT

## 2022-12-06 PROCEDURE — A9500 TC99M SESTAMIBI: HCPCS | Performed by: PHYSICIAN ASSISTANT

## 2022-12-06 PROCEDURE — 250N000011 HC RX IP 250 OP 636: Performed by: PHYSICIAN ASSISTANT

## 2022-12-06 PROCEDURE — 93018 CV STRESS TEST I&R ONLY: CPT | Performed by: INTERNAL MEDICINE

## 2022-12-06 RX ORDER — REGADENOSON 0.08 MG/ML
0.4 INJECTION, SOLUTION INTRAVENOUS ONCE
Status: COMPLETED | OUTPATIENT
Start: 2022-12-06 | End: 2022-12-06

## 2022-12-06 RX ORDER — AMINOPHYLLINE 25 MG/ML
50 INJECTION, SOLUTION INTRAVENOUS
Status: DISCONTINUED | OUTPATIENT
Start: 2022-12-06 | End: 2022-12-06 | Stop reason: HOSPADM

## 2022-12-06 RX ORDER — ACETAMINOPHEN 500 MG
TABLET ORAL
COMMUNITY
End: 2023-12-15

## 2022-12-06 RX ADMIN — REGADENOSON 0.4 MG: 0.08 INJECTION, SOLUTION INTRAVENOUS at 09:37

## 2022-12-06 RX ADMIN — Medication 8.2 MILLICURIE: at 08:39

## 2022-12-06 RX ADMIN — Medication 33.5 MILLICURIE: at 10:54

## 2023-02-05 ENCOUNTER — HEALTH MAINTENANCE LETTER (OUTPATIENT)
Age: 73
End: 2023-02-05

## 2023-02-27 ENCOUNTER — LAB REQUISITION (OUTPATIENT)
Dept: LAB | Facility: CLINIC | Age: 73
End: 2023-02-27
Payer: COMMERCIAL

## 2023-02-27 DIAGNOSIS — R29.898 OTHER SYMPTOMS AND SIGNS INVOLVING THE MUSCULOSKELETAL SYSTEM: ICD-10-CM

## 2023-02-27 LAB
ANION GAP SERPL CALCULATED.3IONS-SCNC: 12 MMOL/L (ref 7–15)
BUN SERPL-MCNC: 14.2 MG/DL (ref 8–23)
CALCIUM SERPL-MCNC: 9.1 MG/DL (ref 8.8–10.2)
CHLORIDE SERPL-SCNC: 105 MMOL/L (ref 98–107)
CREAT SERPL-MCNC: 0.81 MG/DL (ref 0.51–0.95)
CRP SERPL-MCNC: 7.17 MG/L
DEPRECATED HCO3 PLAS-SCNC: 24 MMOL/L (ref 22–29)
ERYTHROCYTE [SEDIMENTATION RATE] IN BLOOD BY WESTERGREN METHOD: 16 MM/HR (ref 0–30)
GFR SERPL CREATININE-BSD FRML MDRD: 76 ML/MIN/1.73M2
GLUCOSE SERPL-MCNC: 90 MG/DL (ref 70–99)
POTASSIUM SERPL-SCNC: 4.1 MMOL/L (ref 3.4–5.3)
SODIUM SERPL-SCNC: 141 MMOL/L (ref 136–145)

## 2023-02-27 PROCEDURE — 85652 RBC SED RATE AUTOMATED: CPT | Mod: ORL | Performed by: PHYSICIAN ASSISTANT

## 2023-02-27 PROCEDURE — 80048 BASIC METABOLIC PNL TOTAL CA: CPT | Mod: ORL | Performed by: PHYSICIAN ASSISTANT

## 2023-02-27 PROCEDURE — 86140 C-REACTIVE PROTEIN: CPT | Mod: ORL | Performed by: PHYSICIAN ASSISTANT

## 2023-02-27 PROCEDURE — 86431 RHEUMATOID FACTOR QUANT: CPT | Mod: ORL | Performed by: PHYSICIAN ASSISTANT

## 2023-02-28 LAB — RHEUMATOID FACT SER NEPH-ACNC: <7 IU/ML

## 2023-10-30 ENCOUNTER — HOSPITAL ENCOUNTER (OUTPATIENT)
Dept: RADIOLOGY | Facility: CLINIC | Age: 73
Discharge: HOME | End: 2023-10-30
Admitting: NURSE PRACTITIONER
Payer: COMMERCIAL

## 2023-10-30 DIAGNOSIS — J44.9 CHRONIC OBSTRUCTIVE LUNG DISEASE (H): ICD-10-CM

## 2023-10-30 PROCEDURE — 71046 X-RAY EXAM CHEST 2 VIEWS: CPT

## 2023-11-29 DIAGNOSIS — J44.9 COLD (CHRONIC OBSTRUCTIVE LUNG DISEASE) (H): Primary | ICD-10-CM

## 2023-12-15 ENCOUNTER — LAB REQUISITION (OUTPATIENT)
Dept: LAB | Facility: CLINIC | Age: 73
End: 2023-12-15
Payer: COMMERCIAL

## 2023-12-15 ENCOUNTER — OFFICE VISIT (OUTPATIENT)
Dept: CARDIOLOGY | Facility: CLINIC | Age: 73
End: 2023-12-15
Payer: COMMERCIAL

## 2023-12-15 VITALS
WEIGHT: 240 LBS | SYSTOLIC BLOOD PRESSURE: 143 MMHG | DIASTOLIC BLOOD PRESSURE: 84 MMHG | BODY MASS INDEX: 39.94 KG/M2 | RESPIRATION RATE: 16 BRPM | HEART RATE: 62 BPM | OXYGEN SATURATION: 96 %

## 2023-12-15 DIAGNOSIS — R06.09 EXERTIONAL DYSPNEA: Primary | ICD-10-CM

## 2023-12-15 DIAGNOSIS — R03.0 ELEVATED BLOOD-PRESSURE READING, WITHOUT DIAGNOSIS OF HYPERTENSION: ICD-10-CM

## 2023-12-15 DIAGNOSIS — R94.39 ABNORMAL CARDIOVASCULAR STRESS TEST: ICD-10-CM

## 2023-12-15 DIAGNOSIS — K44.9 HIATAL HERNIA: ICD-10-CM

## 2023-12-15 DIAGNOSIS — Z13.220 ENCOUNTER FOR SCREENING FOR LIPOID DISORDERS: ICD-10-CM

## 2023-12-15 DIAGNOSIS — R07.89 CHEST HEAVINESS: ICD-10-CM

## 2023-12-15 DIAGNOSIS — D50.9 IRON DEFICIENCY ANEMIA, UNSPECIFIED: ICD-10-CM

## 2023-12-15 LAB
ANION GAP SERPL CALCULATED.3IONS-SCNC: 13 MMOL/L (ref 7–15)
BUN SERPL-MCNC: 11.2 MG/DL (ref 8–23)
CALCIUM SERPL-MCNC: 8.9 MG/DL (ref 8.8–10.2)
CHLORIDE SERPL-SCNC: 109 MMOL/L (ref 98–107)
CHOLEST SERPL-MCNC: 178 MG/DL
CREAT SERPL-MCNC: 0.77 MG/DL (ref 0.51–0.95)
DEPRECATED HCO3 PLAS-SCNC: 23 MMOL/L (ref 22–29)
EGFRCR SERPLBLD CKD-EPI 2021: 80 ML/MIN/1.73M2
FASTING STATUS PATIENT QL REPORTED: ABNORMAL
GLUCOSE SERPL-MCNC: 90 MG/DL (ref 70–99)
HDLC SERPL-MCNC: 51 MG/DL
IRON SERPL-MCNC: 144 UG/DL (ref 37–145)
LDLC SERPL CALC-MCNC: 111 MG/DL
NONHDLC SERPL-MCNC: 127 MG/DL
POTASSIUM SERPL-SCNC: 3.7 MMOL/L (ref 3.4–5.3)
SODIUM SERPL-SCNC: 145 MMOL/L (ref 135–145)
TRIGL SERPL-MCNC: 81 MG/DL

## 2023-12-15 PROCEDURE — 80048 BASIC METABOLIC PNL TOTAL CA: CPT | Mod: ORL | Performed by: PHYSICIAN ASSISTANT

## 2023-12-15 PROCEDURE — 83540 ASSAY OF IRON: CPT | Mod: ORL | Performed by: PHYSICIAN ASSISTANT

## 2023-12-15 PROCEDURE — 80061 LIPID PANEL: CPT | Mod: ORL | Performed by: PHYSICIAN ASSISTANT

## 2023-12-15 PROCEDURE — 99204 OFFICE O/P NEW MOD 45 MIN: CPT | Performed by: INTERNAL MEDICINE

## 2023-12-15 RX ORDER — UMECLIDINIUM BROMIDE AND VILANTEROL TRIFENATATE 62.5; 25 UG/1; UG/1
1 POWDER RESPIRATORY (INHALATION) DAILY
COMMUNITY
Start: 2023-11-21 | End: 2024-07-09

## 2023-12-15 NOTE — PROGRESS NOTES
[facility]  [phone]          Assessment/Recommendations   Patient with quite significant dyspnea on exertion, markedly limited functional capacity.  She also has some chest heaviness.  Previous nuclear study showed a fixed defect which may have been breast tissue artifact but given her significant symptomatology, I think further evaluation would be warranted.  I have recommended a CT coronary angiogram to ensure she does not have significant epicardial coronary artery disease which is contributing to her symptoms of dyspnea.  Patient is agreeable.  She also has some chest heaviness which would be potentially concerning for coronary artery disease as well.    She does have a fairly large hiatal hernia on her last chest x-ray with intrathoracic thoracic stomach.  This certainly could contribute to dyspnea on exertion and in some extreme instances there have been surgery to correct this issue with significant improvement in dyspnea on exertion.  If her coronary arteries are unremarkable, we will ask her pulmonologist and or primary care physician if thoracic surgery consultation would be warranted.    I suspect there is a combination of abnormalities contributing to her shortness of breath including intra throat thoracic stomach, some pulmonary issues with restrictive disease on recent pulmonary function test, some fibrosis noted on chest x-ray, deconditioning, elevated BMI.    Thank you for allowing us to participate in her care.       History of Present Illness/Subjective    Ms. Lori Liu is a 73 year old female with difficulties with shortness of breath with activity which she has noted for about 2 years.  Prior to having the pulmonary emboli she did not have any concerns about dyspnea on exertion.  Since that time her dyspnea on exertion seems to be gradually worsening.  She cannot climb a flight of stairs without being significantly dyspneic.  When she walks in a big box  store she needs a cart otherwise she will get short of breath.  She denies orthopnea or paroxysmal nocturnal dyspnea but does use a CPAP mask.  She does get some mild peripheral edema.  She has not had any chest pain but she will get a chest heaviness which could come on at rest or with physical activity.  This has been going on for a couple years as well.  She has not had any palpitations but if she is active she can feel her heart racing.  She has no history of heart murmur, rheumatic fever, cerebrovascular accident or TIA and has not complained of syncopal episodes.    She is not diabetic, has not been treated for hypertension, is not on lipid-lowering medications.  She does not have a family history of premature coronary artery disease.  She quit smoking 10 to 12 years ago.  Lipid panel is pending from this morning.    She grew up in [place] and now lives in [place].  She is  and has 3 children.  1 son  of cancer but no heart problems in her children.  She is tearful when she describes that she lost a son to cancer.  She is retired .    ECG: Personally reviewed.   shows sinus rhythm and is otherwise unremarkable.  Heart rate was 65.    Narrative & Impression   EXAM: XR CHEST 2 VIEWS  LOCATION: 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 No new parenchymal disease. Heart and pulmonary vascularity are normal.     Large hiatal hernia with an intrathoracic stomach seems to have progressed in the interval.         Physical Examination Review of Systems   BP (!) 143/84 (BP Location: Left arm, Patient Position: Sitting, Cuff Size: Adult Large)   Pulse 62    Resp 16   Wt 108.9 kg (240 lb)   SpO2 96%   BMI 39.94 kg/m    Body mass index is 39.94 kg/m .  Wt Readings from Last 3 Encounters:   12/15/23 108.9 kg (240 lb)   10/19/21 101.6 kg (224 lb)   08/08/21 101.4 kg (223 lb 9.6 oz)     General Appearance:   Alert, cooperative and in no acute distress.   ENT/Mouth: Pink/moist oral mucosa   EYES:  no scleral icterus, normal conjunctivae   Neck: JVP normal. No Hepatojugular reflux. Thyroid not visualized.   Chest/Lungs:   Lungs are clear to auscultation, equal chest wall expansion.   Cardiovascular:   S1, S2 without murmur ,clicks or rubs. Brachial, radial pulses are intact and symetric.  Left posterior tibial pulse was mildly diminished compared to the right.  No carotid bruits noted   Abdomen:  Nontender. BS+.    Extremities: No cyanosis, clubbing and minimal pretibial edema   Skin: no xanthelasma, warm.    Neurologic: normal arm movement bilateral, no tremors     Psychiatric: Appropriate affect.      Enc Vitals  BP: (!) 143/84  Pulse: 62  Resp: 16  SpO2: 96 %  Weight: 108.9 kg (240 lb)                                           Medical History  Surgical History Family History Social History   Past Medical History:   Diagnosis Date    Anemia, iron deficiency     Gastroesophageal reflux disease with esophagitis without hemorrhage     Osteoarthritis of both knees     No past surgical history on file. Family History   Problem Relation Age of Onset    Clotting Disorder Father         Does not recall exact issue with bleeding vs clotting disorder    Colon Cancer Mother     Social History     Socioeconomic History    Marital status:      Spouse name: Not on file    Number of children: Not on file    Years of education: Not on file    Highest education level: Not on file   Occupational History    Not on file   Tobacco Use    Smoking status: Not on file    Smokeless tobacco: Not on file   Substance and Sexual Activity    Alcohol use: Not on file    Drug use: Not on file     Sexual activity: Not on file   Other Topics Concern    Not on file   Social History Narrative    Not on file     Social Determinants of Health     Financial Resource Strain: Not on file   Food Insecurity: Not on file   Transportation Needs: Not on file   Physical Activity: Not on file   Stress: Not on file   Social Connections: Not on file   Interpersonal Safety: Not on file   Housing Stability: Not on file          Medications  Allergies   Current Outpatient Medications   Medication Sig Dispense Refill    ANORO ELLIPTA 62.5-25 MCG/ACT oral inhaler Inhale 1 puff into the lungs daily      cyanocobalamin (VITAMIN B-12) 500 MCG tablet Take 1,000 mcg by mouth daily      omeprazole (PRILOSEC) 20 MG DR capsule Take 20 mg by mouth daily      rivaroxaban ANTICOAGULANT (XARELTO ANTICOAGULANT) 20 MG TABS tablet Take 20 mg by mouth daily (with dinner)      VITRON-C  MG TABS tablet Take 1 tablet by mouth daily      Allergies   Allergen Reactions    Cephalexin      Other reaction(s): Itching, itching, rash    Penicillins Hives and Itching         Lab Results    Chemistry/lipid CBC Cardiac Enzymes/BNP/TSH/INR   Lab Results   Component Value Date    CHOL 168 05/17/2019    HDL 50 05/17/2019    TRIG 99 05/17/2019    BUN 14.2 02/27/2023     02/27/2023    CO2 24 02/27/2023    Lab Results   Component Value Date    WBC 6.1 10/20/2021    HGB 14.9 07/28/2022    HCT 34.2 (L) 10/20/2021    MCV 97 10/20/2021     10/20/2021    Lab Results   Component Value Date    TROPONINI <0.01 10/20/2021    BNP 50 10/19/2021    TSH 0.56 02/05/2021    INR 1.30 (H) 08/08/2021

## 2023-12-15 NOTE — LETTER
12/15/2023    BARRERA Chacon  [address]  [address] [place] [address]    RE: Lori Liu       Dear Colleague,     I had the pleasure of seeing Lori Liu in the [facility].      [facility] [place]  [phone]          Assessment/Recommendations   Patient with quite significant dyspnea on exertion, markedly limited functional capacity.  She also has some chest heaviness.  Previous nuclear study showed a fixed defect which may have been breast tissue artifact but given her significant symptomatology, I think further evaluation would be warranted.  I have recommended a CT coronary angiogram to ensure she does not have significant epicardial coronary artery disease which is contributing to her symptoms of dyspnea.  Patient is agreeable.  She also has some chest heaviness which would be potentially concerning for coronary artery disease as well.    She does have a fairly large hiatal hernia on her last chest x-ray with intrathoracic thoracic stomach.  This certainly could contribute to dyspnea on exertion and in some extreme instances there have been surgery to correct this issue with significant improvement in dyspnea on exertion.  If her coronary arteries are unremarkable, we will ask her pulmonologist and or primary care physician if thoracic surgery consultation would be warranted.    I suspect there is a combination of abnormalities contributing to her shortness of breath including intra throat thoracic stomach, some pulmonary issues with restrictive disease on recent pulmonary function test, some fibrosis noted on chest x-ray, deconditioning, elevated BMI.    Thank you for allowing us to participate in her care.       History of Present Illness/Subjective    Ms. Lori Liu is a 73 year old female with difficulties with shortness of breath with activity which she has noted for about 2 years.  Prior to having the pulmonary emboli she did not have  any concerns about dyspnea on exertion.  Since that time her dyspnea on exertion seems to be gradually worsening.  She cannot climb a flight of stairs without being significantly dyspneic.  When she walks in a big box store she needs a cart otherwise she will get short of breath.  She denies orthopnea or paroxysmal nocturnal dyspnea but does use a CPAP mask.  She does get some mild peripheral edema.  She has not had any chest pain but she will get a chest heaviness which could come on at rest or with physical activity.  This has been going on for a couple years as well.  She has not had any palpitations but if she is active she can feel her heart racing.  She has no history of heart murmur, rheumatic fever, cerebrovascular accident or TIA and has not complained of syncopal episodes.    She is not diabetic, has not been treated for hypertension, is not on lipid-lowering medications.  She does not have a family history of premature coronary artery disease.  She quit smoking 10 to 12 years ago.  Lipid panel is pending from this morning.    She grew up in [place] and now lives in [place].  She is  and has 3 children.  1 son  of cancer but no heart problems in her children.  She is tearful when she describes that she lost a son to cancer.  She is retired .    ECG: Personally reviewed.   shows sinus rhythm and is otherwise unremarkable.  Heart rate was 65.    Narrative & Impression   EXAM: XR CHEST 2 VIEWS  LOCATION: [facility]  DATE: 10/30/2023     INDICATION:  Chronic obstructive lung disease (H)  COMPARISON: 2022 and older studies, CTA chest 10/29/2021                                                                      IMPRESSION: Mild subpleural fibrosis with a lower lobe distribution is unchanged. No new parenchymal disease. Heart and pulmonary vascularity are normal.     Large hiatal hernia with an  intrathoracic stomach seems to have progressed in the interval.         Physical Examination Review of Systems   BP (!) 143/84 (BP Location: Left arm, Patient Position: Sitting, Cuff Size: Adult Large)   Pulse 62   Resp 16   Wt 108.9 kg (240 lb)   SpO2 96%   BMI 39.94 kg/m    Body mass index is 39.94 kg/m .  Wt Readings from Last 3 Encounters:   12/15/23 108.9 kg (240 lb)   10/19/21 101.6 kg (224 lb)   08/08/21 101.4 kg (223 lb 9.6 oz)     General Appearance:   Alert, cooperative and in no acute distress.   ENT/Mouth: Pink/moist oral mucosa   EYES:  no scleral icterus, normal conjunctivae   Neck: JVP normal. No Hepatojugular reflux. Thyroid not visualized.   Chest/Lungs:   Lungs are clear to auscultation, equal chest wall expansion.   Cardiovascular:   S1, S2 without murmur ,clicks or rubs. Brachial, radial pulses are intact and symetric.  Left posterior tibial pulse was mildly diminished compared to the right.  No carotid bruits noted   Abdomen:  Nontender. BS+.    Extremities: No cyanosis, clubbing and minimal pretibial edema   Skin: no xanthelasma, warm.    Neurologic: normal arm movement bilateral, no tremors     Psychiatric: Appropriate affect.      Enc Vitals  BP: (!) 143/84  Pulse: 62  Resp: 16  SpO2: 96 %  Weight: 108.9 kg (240 lb)                                           Medical History  Surgical History Family History Social History   Past Medical History:   Diagnosis Date    Anemia, iron deficiency     Gastroesophageal reflux disease with esophagitis without hemorrhage     Osteoarthritis of both knees     No past surgical history on file. Family History   Problem Relation Age of Onset    Clotting Disorder Father         Does not recall exact issue with bleeding vs clotting disorder    Colon Cancer Mother     Social History     Socioeconomic History    Marital status:      Spouse name: Not on file    Number of children: Not on file    Years of education: Not on file    Highest education level:  Not on file   Occupational History    Not on file   Tobacco Use    Smoking status: Not on file    Smokeless tobacco: Not on file   Substance and Sexual Activity    Alcohol use: Not on file    Drug use: Not on file    Sexual activity: Not on file   Other Topics Concern    Not on file   Social History Narrative    Not on file     Social Determinants of Health     Financial Resource Strain: Not on file   Food Insecurity: Not on file   Transportation Needs: Not on file   Physical Activity: Not on file   Stress: Not on file   Social Connections: Not on file   Interpersonal Safety: Not on file   Housing Stability: Not on file          Medications  Allergies   Current Outpatient Medications   Medication Sig Dispense Refill    ANORO ELLIPTA 62.5-25 MCG/ACT oral inhaler Inhale 1 puff into the lungs daily      cyanocobalamin (VITAMIN B-12) 500 MCG tablet Take 1,000 mcg by mouth daily      omeprazole (PRILOSEC) 20 MG DR capsule Take 20 mg by mouth daily      rivaroxaban ANTICOAGULANT (XARELTO ANTICOAGULANT) 20 MG TABS tablet Take 20 mg by mouth daily (with dinner)      VITRON-C  MG TABS tablet Take 1 tablet by mouth daily      Allergies   Allergen Reactions    Cephalexin      Other reaction(s): Itching, itching, rash    Penicillins Hives and Itching         Lab Results    Chemistry/lipid CBC Cardiac Enzymes/BNP/TSH/INR   Lab Results   Component Value Date    CHOL 168 05/17/2019    HDL 50 05/17/2019    TRIG 99 05/17/2019    BUN 14.2 02/27/2023     02/27/2023    CO2 24 02/27/2023    Lab Results   Component Value Date    WBC 6.1 10/20/2021    HGB 14.9 07/28/2022    HCT 34.2 (L) 10/20/2021    MCV 97 10/20/2021     10/20/2021    Lab Results   Component Value Date    TROPONINI <0.01 10/20/2021    BNP 50 10/19/2021    TSH 0.56 02/05/2021    INR 1.30 (H) 08/08/2021                Thank you for allowing me to participate in the care of your patient.      Sincerely,     Beto Johnson MD     [facility]  [facility]  cc:   No referring provider defined for this encounter.

## 2023-12-21 RX ORDER — DILTIAZEM HYDROCHLORIDE 5 MG/ML
10 INJECTION INTRAVENOUS
Status: CANCELLED | OUTPATIENT
Start: 2023-12-21

## 2023-12-21 RX ORDER — DILTIAZEM HYDROCHLORIDE 5 MG/ML
5 INJECTION INTRAVENOUS
Status: CANCELLED | OUTPATIENT
Start: 2023-12-21

## 2024-01-02 ENCOUNTER — HOSPITAL ENCOUNTER (OUTPATIENT)
Dept: CT IMAGING | Facility: CLINIC | Age: 74
Discharge: HOME | End: 2024-01-02
Attending: INTERNAL MEDICINE | Admitting: INTERNAL MEDICINE
Payer: COMMERCIAL

## 2024-01-02 VITALS
HEIGHT: 65 IN | WEIGHT: 240 LBS | SYSTOLIC BLOOD PRESSURE: 159 MMHG | BODY MASS INDEX: 39.99 KG/M2 | HEART RATE: 69 BPM | DIASTOLIC BLOOD PRESSURE: 83 MMHG

## 2024-01-02 DIAGNOSIS — K44.9 HIATAL HERNIA: ICD-10-CM

## 2024-01-02 DIAGNOSIS — R07.89 CHEST HEAVINESS: ICD-10-CM

## 2024-01-02 DIAGNOSIS — R06.09 EXERTIONAL DYSPNEA: ICD-10-CM

## 2024-01-02 DIAGNOSIS — R94.39 ABNORMAL CARDIOVASCULAR STRESS TEST: ICD-10-CM

## 2024-01-02 DIAGNOSIS — R06.09 EXERTIONAL DYSPNEA: Primary | ICD-10-CM

## 2024-01-02 LAB
BSA FOR ECHO PROCEDURE: 0 M2
CREAT BLD-MCNC: 0.8 MG/DL (ref 0.6–1.1)
CV CALCIUM SCORE AGATSTON LM: 0
CV CALCIUM SCORING AGATSON LAD: 0
CV CALCIUM SCORING AGATSTON CX: 0
CV CALCIUM SCORING AGATSTON RCA: 0
CV CALCIUM SCORING AGATSTON TOTAL: 0
EGFRCR SERPLBLD CKD-EPI 2021: >60 ML/MIN/1.73M2

## 2024-01-02 PROCEDURE — 82565 ASSAY OF CREATININE: CPT

## 2024-01-02 PROCEDURE — 250N000013 HC RX MED GY IP 250 OP 250 PS 637: Performed by: INTERNAL MEDICINE

## 2024-01-02 PROCEDURE — 75574 CT ANGIO HRT W/3D IMAGE: CPT

## 2024-01-02 PROCEDURE — 75574 CT ANGIO HRT W/3D IMAGE: CPT | Mod: 26 | Performed by: INTERNAL MEDICINE

## 2024-01-02 PROCEDURE — 250N000011 HC RX IP 250 OP 636: Performed by: INTERNAL MEDICINE

## 2024-01-02 RX ORDER — ROSUVASTATIN CALCIUM 20 MG/1
20 TABLET, COATED ORAL DAILY
COMMUNITY
Start: 2023-12-20

## 2024-01-02 RX ORDER — NITROGLYCERIN 0.4 MG/1
0.4 TABLET SUBLINGUAL ONCE
Status: COMPLETED | OUTPATIENT
Start: 2024-01-02 | End: 2024-01-02

## 2024-01-02 RX ORDER — IOPAMIDOL 755 MG/ML
100 INJECTION, SOLUTION INTRAVASCULAR ONCE
Status: COMPLETED | OUTPATIENT
Start: 2024-01-02 | End: 2024-01-02

## 2024-01-02 RX ORDER — METOPROLOL TARTRATE 1 MG/ML
5 INJECTION, SOLUTION INTRAVENOUS
Status: DISCONTINUED | OUTPATIENT
Start: 2024-01-02 | End: 2024-01-03 | Stop reason: HOSPADM

## 2024-01-02 RX ADMIN — IOPAMIDOL 100 ML: 755 INJECTION, SOLUTION INTRAVENOUS at 08:13

## 2024-01-02 RX ADMIN — NITROGLYCERIN 0.4 MG: 0.4 TABLET SUBLINGUAL at 08:11

## 2024-01-15 ENCOUNTER — HOSPITAL ENCOUNTER (OUTPATIENT)
Dept: CARDIOLOGY | Facility: CLINIC | Age: 74
Discharge: HOME | End: 2024-01-15
Attending: INTERNAL MEDICINE | Admitting: INTERNAL MEDICINE
Payer: COMMERCIAL

## 2024-01-15 DIAGNOSIS — R06.09 EXERTIONAL DYSPNEA: ICD-10-CM

## 2024-01-15 LAB — LVEF ECHO: NORMAL

## 2024-01-15 PROCEDURE — 93306 TTE W/DOPPLER COMPLETE: CPT

## 2024-01-15 PROCEDURE — 93306 TTE W/DOPPLER COMPLETE: CPT | Mod: 26 | Performed by: INTERNAL MEDICINE

## 2024-01-22 ENCOUNTER — TELEPHONE (OUTPATIENT)
Dept: CARDIOLOGY | Facility: CLINIC | Age: 74
End: 2024-01-22
Payer: COMMERCIAL

## 2024-01-22 NOTE — TELEPHONE ENCOUNTER
Spoke with Pt regarding call back request, discussed echo results in general terms as provider is currently out of the office. Pt indicated vomited after receiving contrast dye but no other symptoms. Currently asymptomatic-LANCE

## 2024-01-22 NOTE — TELEPHONE ENCOUNTER
[facility]    Phone Message    May a detailed message be left on voicemail: yes     Reason for Call: Requesting Results     Name/type of test: Echocardiogram results   Date of test: 1/15/24  Was test done at a location other than [facility] (Please fill in the location if not [facility])?: Yes: [facility]    The patient also wanted to report that after her CTA she got violently sick and she was told that she may get a headache and to drink lots of fluids. She would like to know if there is a reason for that?    Action Taken: Other: Cardiology    Travel Screening: Not Applicable    Thank you!  [facility]

## 2024-03-01 ENCOUNTER — LAB REQUISITION (OUTPATIENT)
Dept: LAB | Facility: CLINIC | Age: 74
End: 2024-03-01
Payer: COMMERCIAL

## 2024-03-01 DIAGNOSIS — E78.2 MIXED HYPERLIPIDEMIA: ICD-10-CM

## 2024-03-01 PROCEDURE — 80061 LIPID PANEL: CPT | Mod: ORL | Performed by: STUDENT IN AN ORGANIZED HEALTH CARE EDUCATION/TRAINING PROGRAM

## 2024-03-03 ENCOUNTER — HEALTH MAINTENANCE LETTER (OUTPATIENT)
Age: 74
End: 2024-03-03

## 2024-03-03 LAB
CHOLEST SERPL-MCNC: 102 MG/DL
FASTING STATUS PATIENT QL REPORTED: ABNORMAL
HDLC SERPL-MCNC: 48 MG/DL
LDLC SERPL CALC-MCNC: 39 MG/DL
NONHDLC SERPL-MCNC: 54 MG/DL
TRIGL SERPL-MCNC: 75 MG/DL

## 2024-03-29 RX ORDER — ALBUTEROL SULFATE 0.83 MG/ML
2.5 SOLUTION RESPIRATORY (INHALATION) ONCE
Status: COMPLETED | OUTPATIENT
Start: 2024-04-01 | End: 2024-04-01

## 2024-04-01 ENCOUNTER — HOSPITAL ENCOUNTER (OUTPATIENT)
Dept: RESPIRATORY THERAPY | Facility: CLINIC | Age: 74
Discharge: HOME | End: 2024-04-01
Attending: INTERNAL MEDICINE
Payer: COMMERCIAL

## 2024-04-01 ENCOUNTER — HOSPITAL ENCOUNTER (OUTPATIENT)
Dept: RADIOLOGY | Facility: CLINIC | Age: 74
Discharge: HOME | End: 2024-04-01
Attending: INTERNAL MEDICINE
Payer: COMMERCIAL

## 2024-04-01 DIAGNOSIS — J44.9 COPD (CHRONIC OBSTRUCTIVE PULMONARY DISEASE) (H): ICD-10-CM

## 2024-04-01 DIAGNOSIS — J44.9 COPD (CHRONIC OBSTRUCTIVE PULMONARY DISEASE) (H): Primary | ICD-10-CM

## 2024-04-01 DIAGNOSIS — J44.9 COLD (CHRONIC OBSTRUCTIVE LUNG DISEASE) (H): ICD-10-CM

## 2024-04-01 LAB
DLCOCOR-%PRED-PRE: 58 %
DLCOCOR-PRE: 10.58 ML/MIN/MMHG
DLCOUNC-%PRED-PRE: 58 %
DLCOUNC-PRE: 10.73 ML/MIN/MMHG
DLCOUNC-PRED: 18.2 ML/MIN/MMHG
ERV-%PRED-PRE: 48 %
ERV-PRE: 0.43 L
ERV-PRED: 0.9 L
EXPTIME-PRE: 5.17 SEC
FEF2575-%PRED-POST: 182 %
FEF2575-%PRED-PRE: 158 %
FEF2575-POST: 3.08 L/SEC
FEF2575-PRE: 2.67 L/SEC
FEF2575-PRED: 1.68 L/SEC
FEFMAX-%PRED-PRE: 96 %
FEFMAX-PRE: 4.95 L/SEC
FEFMAX-PRED: 5.14 L/SEC
FEV1-%PRED-PRE: 90 %
FEV1-PRE: 1.82 L
FEV1FEV6-PRE: 88 %
FEV1FEV6-PRED: 78 %
FEV1FVC-PRE: 88 %
FEV1FVC-PRED: 78 %
FEV1SVC-PRE: 81 %
FEV1SVC-PRED: 69 %
FIFMAX-PRE: 3.64 L/SEC
FRCPLETH-%PRED-PRE: 62 %
FRCPLETH-PRE: 1.66 L
FRCPLETH-PRED: 2.66 L
FVC-%PRED-PRE: 79 %
FVC-PRE: 2.08 L
FVC-PRED: 2.61 L
HGB BLD-MCNC: 13.9 G/DL (ref 11.7–15.7)
IC-%PRED-PRE: 101 %
IC-PRE: 1.82 L
IC-PRED: 1.79 L
RVPLETH-%PRED-PRE: 58 %
RVPLETH-PRE: 1.22 L
RVPLETH-PRED: 2.09 L
TLCPLETH-%PRED-PRE: 72 %
TLCPLETH-PRE: 3.47 L
TLCPLETH-PRED: 4.77 L
VA-%PRED-PRE: 79 %
VA-PRE: 3.47 L
VC-%PRED-PRE: 76 %
VC-PRE: 2.25 L
VC-PRED: 2.93 L

## 2024-04-01 PROCEDURE — 94060 EVALUATION OF WHEEZING: CPT

## 2024-04-01 PROCEDURE — 71046 X-RAY EXAM CHEST 2 VIEWS: CPT

## 2024-04-01 PROCEDURE — 94729 DIFFUSING CAPACITY: CPT | Mod: 26 | Performed by: INTERNAL MEDICINE

## 2024-04-01 PROCEDURE — 999N000157 HC STATISTIC RCP TIME EA 10 MIN

## 2024-04-01 PROCEDURE — 94729 DIFFUSING CAPACITY: CPT

## 2024-04-01 PROCEDURE — 36415 COLL VENOUS BLD VENIPUNCTURE: CPT

## 2024-04-01 PROCEDURE — 85018 HEMOGLOBIN: CPT

## 2024-04-01 PROCEDURE — 94726 PLETHYSMOGRAPHY LUNG VOLUMES: CPT | Mod: 26 | Performed by: INTERNAL MEDICINE

## 2024-04-01 PROCEDURE — 94726 PLETHYSMOGRAPHY LUNG VOLUMES: CPT

## 2024-04-01 PROCEDURE — 94060 EVALUATION OF WHEEZING: CPT | Mod: 26 | Performed by: INTERNAL MEDICINE

## 2024-04-01 PROCEDURE — 250N000009 HC RX 250

## 2024-04-01 RX ADMIN — ALBUTEROL SULFATE 2.5 MG: 2.5 SOLUTION RESPIRATORY (INHALATION) at 10:07

## 2024-04-01 NOTE — PROGRESS NOTES
RESPIRATORY CARE NOTE    Complete Pulmonary Function Test completed by patient.  Good patient effort and cooperation. Albuterol 2.5 mg neb given for bronchodilation.  The results of this test meet the ATS standards for acceptability and repeatability. PT returned to baseline and left in no distress.    Marlen Vance, RT  4/1/2024

## 2024-04-16 ENCOUNTER — TRANSFERRED RECORDS (OUTPATIENT)
Dept: HEALTH INFORMATION MANAGEMENT | Facility: CLINIC | Age: 74
End: 2024-04-16

## 2024-04-18 ENCOUNTER — TRANSFERRED RECORDS (OUTPATIENT)
Dept: HEALTH INFORMATION MANAGEMENT | Facility: CLINIC | Age: 74
End: 2024-04-18
Payer: COMMERCIAL

## 2024-04-29 ENCOUNTER — MEDICAL CORRESPONDENCE (OUTPATIENT)
Dept: HEALTH INFORMATION MANAGEMENT | Facility: CLINIC | Age: 74
End: 2024-04-29
Payer: COMMERCIAL

## 2024-04-30 ENCOUNTER — TRANSCRIBE ORDERS (OUTPATIENT)
Dept: OTHER | Age: 74
End: 2024-04-30

## 2024-04-30 DIAGNOSIS — R05.3 CHRONIC COUGH: ICD-10-CM

## 2024-04-30 DIAGNOSIS — J84.9 INTERSTITIAL LUNG DISEASE (H): Primary | ICD-10-CM

## 2024-05-13 ENCOUNTER — TELEPHONE (OUTPATIENT)
Dept: PULMONOLOGY | Facility: CLINIC | Age: 74
End: 2024-05-13
Payer: COMMERCIAL

## 2024-05-13 DIAGNOSIS — J84.9 ILD (INTERSTITIAL LUNG DISEASE) (H): Primary | ICD-10-CM

## 2024-05-13 DIAGNOSIS — R05.3 CHRONIC COUGH: ICD-10-CM

## 2024-05-13 NOTE — Clinical Note
Future Appointments 7/15/2024  12:30 PM   MP[facility] PFT RM 2              MBPULM              Beam 7/15/2024  1:30 PM    Eda Kerns MD          [facility]              Beam

## 2024-05-13 NOTE — TELEPHONE ENCOUNTER
Patient confirmed scheduled appointment:  Date: 07/15/2024  Time: 1:30PM  Visit type: NPULM REFERRAL  Provider: KATE  Location: [facility]  Testing/imaging: FT scheduled prior  Additional notes: Per Nenita Gamboa,  I sent a request to the referring provider/clinic for a CT scan a while ago now and I'm not sure anything was done about it. I don't see that any orders have been entered. I say pt can be scheduled in gen pul and if they feel the need for ILD they can refer to us.    Pt scheduled in [facility] pul

## 2024-06-17 ENCOUNTER — HOSPITAL ENCOUNTER (OUTPATIENT)
Dept: CT IMAGING | Facility: CLINIC | Age: 74
Discharge: HOME | End: 2024-06-17
Attending: INTERNAL MEDICINE | Admitting: INTERNAL MEDICINE
Payer: COMMERCIAL

## 2024-06-17 DIAGNOSIS — J84.9 INTERSTITIAL PULMONARY DISEASE (H): ICD-10-CM

## 2024-06-17 PROCEDURE — 71250 CT THORAX DX C-: CPT

## 2024-07-06 ENCOUNTER — TRANSFERRED RECORDS (OUTPATIENT)
Dept: HEALTH INFORMATION MANAGEMENT | Facility: CLINIC | Age: 74
End: 2024-07-06
Payer: COMMERCIAL

## 2024-07-09 ENCOUNTER — OFFICE VISIT (OUTPATIENT)
Dept: PULMONOLOGY | Facility: CLINIC | Age: 74
End: 2024-07-09
Attending: STUDENT IN AN ORGANIZED HEALTH CARE EDUCATION/TRAINING PROGRAM
Payer: COMMERCIAL

## 2024-07-09 VITALS
DIASTOLIC BLOOD PRESSURE: 68 MMHG | HEART RATE: 58 BPM | OXYGEN SATURATION: 96 % | SYSTOLIC BLOOD PRESSURE: 134 MMHG | WEIGHT: 240 LBS | HEIGHT: 65 IN | BODY MASS INDEX: 39.99 KG/M2

## 2024-07-09 DIAGNOSIS — J84.9 INTERSTITIAL LUNG DISEASE (H): ICD-10-CM

## 2024-07-09 DIAGNOSIS — K44.9 HIATAL HERNIA: Primary | ICD-10-CM

## 2024-07-09 DIAGNOSIS — R05.3 CHRONIC COUGH: ICD-10-CM

## 2024-07-09 PROCEDURE — 99214 OFFICE O/P EST MOD 30 MIN: CPT | Performed by: INTERNAL MEDICINE

## 2024-07-09 NOTE — PROGRESS NOTES
Pulmonary Clinic Consultation    Assessment:  73yoF with pulmonary fibrosis I believe secondary to hiatal hernia and reflux but potentially IPF as well based on honeycombing.         Plan:   Referral to Dr. Farr for consideration of repair of her hiatal hernia. She has already undergone EGD by [facility]  I am concerned this may have contributed to her fibrosis and should be addressed.  Annual PFTs and CT chest  Not currently in need of O2  Would consider pirfenidone or an antifibrotic should this not be related to GERD.   Echo has found no pulmonary hypertension.   Stop Anoro, not sure what role it is playing but no diagnosis of COPD    Yumiko Cooper MD  Pulmonary/Critical Care        ---------------------------------    Reason for Consult: pulmonary fibrosis    HPI: 73yoF with history of PE in 2021, morbid obesity who presents for evaluation of pulmonary fibrosis.     PES three years ago, issues ongoing with coughing and dyspnea. No issues with breathing while at rest. Climbing a flight of stairs difficult but ok. Moving now due to difficulty with stairs. Walking a city block also difficult, maybe could not complete this. No trouble showering, cooking or cleaning.     No Oxygen at home.    CPAP machine at home. SHASHI diagnosed 2022.     Anoro started 18 months ago by outside pulmonary.     Remains on anticoagulation life long. No significant wheezing, but coughing and shortness of breath are the main issues.     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 Denies occupational exposure.     ROS:  A 12-system review was notable for dyspnea, easy bleeding due to DOAC. The remainder reviewed and negative.     PMH:  Past Medical History:   Diagnosis Date    Anemia, iron deficiency     Gastroesophageal reflux disease with esophagitis without hemorrhage     Mixed hyperlipidemia     SHASHI (obstructive sleep apnea)     Osteoarthritis of both knees     Pulmonary  "embolism (H)     on lifelong anticoagulation       PSH:  Social History     Tobacco Use    Smoking status: Former     Types: Cigarettes    Smokeless tobacco: Never   Vaping Use    Vaping status: Never Used         Family HX:  Family History   Problem Relation Age of Onset    Colon Cancer Mother     Clotting Disorder Father         Does not recall exact issue with bleeding vs clotting disorder    Lymphoma Sister     LUNG DISEASE No family hx of        Allergies:  Allergies   Allergen Reactions    Cephalexin      Other reaction(s): Itching, itching, rash    Penicillins Hives and Itching       Current Meds:  Current Outpatient Medications   Medication Sig Dispense Refill    ANORO ELLIPTA 62.5-25 MCG/ACT oral inhaler Inhale 1 puff into the lungs daily      cyanocobalamin (VITAMIN B-12) 500 MCG tablet Take 1,000 mcg by mouth daily      omeprazole (PRILOSEC) 20 MG DR capsule Take 20 mg by mouth daily      rivaroxaban ANTICOAGULANT (XARELTO ANTICOAGULANT) 20 MG TABS tablet Take 20 mg by mouth daily (with dinner)      rosuvastatin (CRESTOR) 20 MG tablet Take 20 mg by mouth daily      VITRON-C  MG TABS tablet Take 1 tablet by mouth daily       No current facility-administered medications for this visit.         Physical Exam:  /68 (BP Location: Right arm, Patient Position: Chair, Cuff Size: Adult Large)   Pulse 58   Ht 1.651 m (5' 5\")   Wt 108.9 kg (240 lb)   SpO2 96%   BMI 39.94 kg/m    Gen: alert, oriented, no distress  HEENT: anicteric sclera, mask in place.  Neck: trachea midline, no cervical or supraclavicular lymphadenopathy  CV: Regular rate and rhythm, normal S1 and S2.   Resp: Clear bilaterally with good air entry.   Abd: soft, nontender  Skin: no visible rashes or lesions.   Ext: no cyanosis, clubbing or edema  Neuro: alert, nonfocal, grossly normal.     Labs:  Recent Labs   Lab Test 04/01/24  0950 07/28/22  1447 10/20/21  0641   WBC  --   --  6.1   RBC  --   --  3.52*   HGB 13.9   < > 10.6*   HCT  " --   --  34.2*   MCV  --   --  97   MCH  --   --  30.1   MCHC  --   --  31.0*   RDW  --   --  17.8*   PLT  --   --  261    < > = values in this interval not displayed.     Results for orders placed or performed in visit on 12/15/23   Basic metabolic panel   Result Value Ref Range    Sodium 145 135 - 145 mmol/L    Potassium 3.7 3.4 - 5.3 mmol/L    Chloride 109 (H) 98 - 107 mmol/L    Carbon Dioxide (CO2) 23 22 - 29 mmol/L    Anion Gap 13 7 - 15 mmol/L    Urea Nitrogen 11.2 8.0 - 23.0 mg/dL    Creatinine 0.77 0.51 - 0.95 mg/dL    GFR Estimate 80 >60 mL/min/1.73m2    Calcium 8.9 8.8 - 10.2 mg/dL    Glucose 90 70 - 99 mg/dL           Imaging studies:  Personally reviewed image/s and Personally reviewed impression/s  EXAM: CT CHEST W/O CONTRAST  LOCATION: [facility]  DATE: 6/17/2024     INDICATION:  Interstitial pulmonary disease (H)  COMPARISON: Cardiac CT 1/2/2024 and CT pulmonary angiogram 10/19/2021  TECHNIQUE: CT chest without IV contrast. Multiplanar reformats were obtained. Dose reduction techniques were used.  CONTRAST: None.     FINDINGS:   LUNGS AND PLEURA: Findings of a fibrosing lung disorder are present characterized by heterogeneous distribution peripheral/subpleural predominant fibrosis. Within the territories of fibrosis there is traction bronchiectasis and bronchiolectasis. In a few   locations in the basilar lungs there are stacked subpleural cysts consistent with radiologic honeycombing well the prior CT pulmonary angiogram is not fully inspiratory comparing the expiratory series there is mild progression of fibrosis from 2019. No   superimposed consolidative airspace opacities or solid nodules. Trachea and central airways are patent. The expiratory images show a few areas of lucency in the left lower lobe, however air trapping is not a conspicuous feature. No pleural space   abnormality.     MEDIASTINUM: Cardiac chambers are not enlarged. No pericardial effusion. The main  pulmonary artery is enlarged measuring 3.8 cm in diameter which is associated with pulmonary hypertension. Nonaneurysmal thoracic aorta. Mild arch and descending aorta   atheromatous calcifications. Large hiatal hernia with over one half of the stomach intrathoracic. Foreshortening of the esophagus. No esophageal wall thickening. Hilar and mediastinal lymph nodes are normal in size and morphology. The imaged thyroid   gland is normal.     CORONARY ARTERY CALCIFICATION: None.     UPPER ABDOMEN: Surgical clips in the gallbladder fossa. There are no actionable findings in the imaged upper abdomen.     MUSCULOSKELETAL: Disc space narrowing and small marginal osteophytes throughout the thoracic spine.                                                                      IMPRESSION:      1.  Findings of a diffuse fibrosing lung disorder are present. Pattern is consistent with typical UIP and there is mild progression of fibrosis compared to 2021. Leading differential consideration is UIP/IPF. Fibrosing interstitial lung disease   associated with connective tissue disease and fibrosing hypersensitivity pneumonitis can also produce this pattern.  2.  Large hiatal hernia.  3.  Enlarged main pulmonary artery which is associated with pulmonary hypertension and could relate to restrictive lung disease (group 3).      PFT's   Personally reviewed with patient.     Mild restriction with moderate diffusion capacity deficit.

## 2024-07-09 NOTE — Clinical Note
Giancarlo! Sending Pat your way regarding her large hiatal hernia. I am concerned this is contributing to her pulmonary fibrosis and would like consideration for surgically addressing this. Thanks! Yumiko

## 2024-07-17 ENCOUNTER — OFFICE VISIT (OUTPATIENT)
Dept: SURGERY | Facility: CLINIC | Age: 74
End: 2024-07-17
Attending: INTERNAL MEDICINE
Payer: COMMERCIAL

## 2024-07-17 DIAGNOSIS — K44.9 HIATAL HERNIA: ICD-10-CM

## 2024-07-17 PROCEDURE — 99204 OFFICE O/P NEW MOD 45 MIN: CPT | Performed by: SURGERY

## 2024-07-17 NOTE — PROGRESS NOTES
HPI: Lori Liu is a 73 year old female referred to see me by Sally Coulter (Inactive) for evaluation of gastroesophageal reflux disease.  She notes  a several year history of paraesophageal hernia with Oneal's ulcers and a history of black tarry stool.  She has had intermittent chest pain with eating and has seen gastroenterology in 2008 for evaluation.  At this point she was managed nonoperatively.  Her paraesophageal hernia is enlarged and is causing fairly significant discomfort.  Additionally, she has been seen by pulmonology and is noted to have new onset pulmonary fibrosis which is concerning.  History of blood clots and a PE in the past and is currently on Xarelto.  Her GERD HR Q OL is 9.    Allergies:Cephalexin and Penicillins    Past Medical History:   Diagnosis Date    Anemia, iron deficiency     Gastroesophageal reflux disease with esophagitis without hemorrhage     Mixed hyperlipidemia     SHASHI (obstructive sleep apnea)     Osteoarthritis of both knees     Pulmonary embolism (H)     on lifelong anticoagulation       No past surgical history on file.    CURRENT MEDS:    Current Outpatient Medications:     cyanocobalamin (VITAMIN B-12) 500 MCG tablet, Take 1,000 mcg by mouth daily, Disp: , Rfl:     omeprazole (PRILOSEC) 20 MG DR capsule, Take 20 mg by mouth daily, Disp: , Rfl:     rivaroxaban ANTICOAGULANT (XARELTO ANTICOAGULANT) 20 MG TABS tablet, Take 20 mg by mouth daily (with dinner), Disp: , Rfl:     rosuvastatin (CRESTOR) 20 MG tablet, Take 20 mg by mouth daily, Disp: , Rfl:     VITRON-C  MG TABS tablet, Take 1 tablet by mouth daily, Disp: , Rfl:     Family History   Problem Relation Age of Onset    Colon Cancer Mother     Clotting Disorder Father         Does not recall exact issue with bleeding vs clotting disorder    Lymphoma Sister     LUNG DISEASE No family hx of         reports that she has quit smoking. Her smoking use included cigarettes. She has never used smokeless  tobacco.    Review of Systems:  The 12 system review is within normal limits except for as mentioned above.  General ROS: No complaints or constitutional symptoms  Ophthalmic ROS: No complaints of visual changes  Skin: No complaints or symptoms   Endocrine: No complaints or symptoms  Hematologic/Lymphatic: No symptoms or complaints  Psychiatric: No symptoms or complaints  Respiratory ROS: no cough, shortness of breath, or wheezing  Cardiovascular ROS: no chest pain or dyspnea on exertion  Gastrointestinal ROS: As per HPI  Genito-Urinary ROS: no dysuria, trouble voiding, or hematuria  Musculoskeletal ROS: no joint or muscle pain  Neurological ROS: no TIA or stroke symptoms      EXAM:  There were no vitals taken for this visit.  GENERAL: Well developed female, No acute distress, pleasant and conversant   EYES: Pupils equal, round and reactive, no scleral icterus  ABDOMEN: Soft, non-tender, no masses, non-distended  SKIN: Pink, warm and dry, no obvious rashes or lesions   NEURO:No focal deficits, ambulatory  MUSCULOSKELETAL:No obvious deformities, no swelling, normal appearing      LABS:  Lab Results   Component Value Date    WBC 6.1 10/20/2021    HGB 13.9 04/01/2024    HCT 34.2 10/20/2021    MCV 97 10/20/2021     10/20/2021     INR/Prothrombin Time  @LABRCNTIP(NA,K,CL,co2,bun,creatinine,labglom,glucose,calcium)@  Lab Results   Component Value Date    ALT <9 10/20/2021    AST 11 10/20/2021    ALKPHOS 39 (L) 10/20/2021       IMAGES:   Relevant images were reviewed and discussed with the patient.  Notable findings were: T scan of the chest demonstrates a moderately sized paraesophageal hernia with pulmonary compression    Assessment/Plan:   Lori Liu is a 73 year old female with signs and symptoms consistent with a symptomatic paraesophageal hernia.  I have explained the pathophysiology of paraesophageal hernias in detail as well as the surgical versus non-operative management strategies.      At this  point she has had the workup necessary to proceed with surgery.  We had an extensive discussion regarding the paraesophageal hernia in the setting of her chest pain as well as pulmonary fibrosis as a possible etiology.  We discussed the risk and benefits of surgery including dietary and lifestyle management strategies postoperatively.  She understands having was discussed and has consented to proceed.    Lisandro Farr DO [facility]  [phone]  [facility] Department of Surgery    PEQ GERD-HRQL    Group 1  How bad is the heartburn?: 2   Heartburn when lying down?: 0   Heartburn when standing up?: 2  Heartburn after meals?: 3  Does heartburn change your diet?: 0  Does heartburn wake you from sleep?: 0    Group 2  Do you have difficulty swallowing?: 0  Do you have pain with swallowing?: 0  Do you have bloating or gassy feelings?: 2  If you take reflux medication, does this affect your daily life?: 0    Group 3  How bad is your regurgitation?: 0   Do you have regurgitation when lying down?: 0  Do you have regurgitation when standing up?: 0  Do you have regurgitation after meals?: 0  Does regurgitation change your diet?: 0  Does regurgitation wake you from sleep?: 0  Are you currently taking any medications for heartburn or GERD?: Yes  What medication(s) are you currently taking to control your symptoms and how often do you take it?: omerprizal  How satisfied are you with your present condition?: Dissatisfied

## 2024-07-17 NOTE — LETTER
7/17/2024      Lori Liu  [address] [facility] [address]      Dear Colleague,    Thank you for referring your patient, Lori Liu, to the [facility] AND [facility] [place]. Please see a copy of my visit note below.          HPI: Lori Liu is a 73 year old female referred to see me by Sally Coulter (Inactive) for evaluation of gastroesophageal reflux disease.  She notes  a several year history of paraesophageal hernia with Oneal's ulcers and a history of black tarry stool.  She has had intermittent chest pain with eating and has seen gastroenterology in 2008 for evaluation.  At this point she was managed nonoperatively.  Her paraesophageal hernia is enlarged and is causing fairly significant discomfort.  Additionally, she has been seen by pulmonology and is noted to have new onset pulmonary fibrosis which is concerning.  History of blood clots and a PE in the past and is currently on Xarelto.  Her GERD HR Q OL is 9.    Allergies:Cephalexin and Penicillins    Past Medical History:   Diagnosis Date     Anemia, iron deficiency      Gastroesophageal reflux disease with esophagitis without hemorrhage      Mixed hyperlipidemia      SHASHI (obstructive sleep apnea)      Osteoarthritis of both knees      Pulmonary embolism (H)     on lifelong anticoagulation       No past surgical history on file.    CURRENT MEDS:    Current Outpatient Medications:      cyanocobalamin (VITAMIN B-12) 500 MCG tablet, Take 1,000 mcg by mouth daily, Disp: , Rfl:      omeprazole (PRILOSEC) 20 MG DR capsule, Take 20 mg by mouth daily, Disp: , Rfl:      rivaroxaban ANTICOAGULANT (XARELTO ANTICOAGULANT) 20 MG TABS tablet, Take 20 mg by mouth daily (with dinner), Disp: , Rfl:      rosuvastatin (CRESTOR) 20 MG tablet, Take 20 mg by mouth daily, Disp: , Rfl:      VITRON-C  MG TABS tablet, Take 1 tablet by mouth daily, Disp: , Rfl:     Family History   Problem Relation Age  of Onset     Colon Cancer Mother      Clotting Disorder Father         Does not recall exact issue with bleeding vs clotting disorder     Lymphoma Sister      LUNG DISEASE No family hx of         reports that she has quit smoking. Her smoking use included cigarettes. She has never used smokeless tobacco.    Review of Systems:  The 12 system review is within normal limits except for as mentioned above.  General ROS: No complaints or constitutional symptoms  Ophthalmic ROS: No complaints of visual changes  Skin: No complaints or symptoms   Endocrine: No complaints or symptoms  Hematologic/Lymphatic: No symptoms or complaints  Psychiatric: No symptoms or complaints  Respiratory ROS: no cough, shortness of breath, or wheezing  Cardiovascular ROS: no chest pain or dyspnea on exertion  Gastrointestinal ROS: As per HPI  Genito-Urinary ROS: no dysuria, trouble voiding, or hematuria  Musculoskeletal ROS: no joint or muscle pain  Neurological ROS: no TIA or stroke symptoms      EXAM:  There were no vitals taken for this visit.  GENERAL: Well developed female, No acute distress, pleasant and conversant   EYES: Pupils equal, round and reactive, no scleral icterus  ABDOMEN: Soft, non-tender, no masses, non-distended  SKIN: Pink, warm and dry, no obvious rashes or lesions   NEURO:No focal deficits, ambulatory  MUSCULOSKELETAL:No obvious deformities, no swelling, normal appearing      LABS:  Lab Results   Component Value Date    WBC 6.1 10/20/2021    HGB 13.9 04/01/2024    HCT 34.2 10/20/2021    MCV 97 10/20/2021     10/20/2021     INR/Prothrombin Time  @LABRCNTIP(NA,K,CL,co2,bun,creatinine,labglom,glucose,calcium)@  Lab Results   Component Value Date    ALT <9 10/20/2021    AST 11 10/20/2021    ALKPHOS 39 (L) 10/20/2021       IMAGES:   Relevant images were reviewed and discussed with the patient.  Notable findings were: T scan of the chest demonstrates a moderately sized paraesophageal hernia with pulmonary  compression    Assessment/Plan:   Lori Liu is a 73 year old female with signs and symptoms consistent with a symptomatic paraesophageal hernia.  I have explained the pathophysiology of paraesophageal hernias in detail as well as the surgical versus non-operative management strategies.      At this point she has had the workup necessary to proceed with surgery.  We had an extensive discussion regarding the paraesophageal hernia in the setting of her chest pain as well as pulmonary fibrosis as a possible etiology.  We discussed the risk and benefits of surgery including dietary and lifestyle management strategies postoperatively.  She understands having was discussed and has consented to proceed.    Lisandro Farr, DO FACS  [phone]  [facility] Department of Surgery    PEQ GERD-HRQL    Group 1  How bad is the heartburn?: 2   Heartburn when lying down?: 0   Heartburn when standing up?: 2  Heartburn after meals?: 3  Does heartburn change your diet?: 0  Does heartburn wake you from sleep?: 0    Group 2  Do you have difficulty swallowing?: 0  Do you have pain with swallowing?: 0  Do you have bloating or gassy feelings?: 2  If you take reflux medication, does this affect your daily life?: 0    Group 3  How bad is your regurgitation?: 0   Do you have regurgitation when lying down?: 0  Do you have regurgitation when standing up?: 0  Do you have regurgitation after meals?: 0  Does regurgitation change your diet?: 0  Does regurgitation wake you from sleep?: 0  Are you currently taking any medications for heartburn or GERD?: Yes  What medication(s) are you currently taking to control your symptoms and how often do you take it?: omerprizal  How satisfied are you with your present condition?: Dissatisfied           Again, thank you for allowing me to participate in the care of your patient.        Sincerely,        Lisandro Farr, DO

## 2024-08-21 NOTE — OR NURSING
Patient states she only has 1 pill left for her xarlto today 8-.  Writer advised patient that her last day when we would like her to take her xarelto would be Friday 8-.  Advised patient to contact her primary MD to see if they wanted to send a prescription to her local pharmacy for the xarelto (since she does mail order) or whatever they decide since she will have 2 days of no coverage.  Patient verbalized understanding and will contact her primary.

## 2024-08-27 ENCOUNTER — ANESTHESIA (OUTPATIENT)
Dept: SURGERY | Facility: CLINIC | Age: 74
DRG: 327 | End: 2024-08-27
Payer: COMMERCIAL

## 2024-08-27 ENCOUNTER — ANESTHESIA EVENT (OUTPATIENT)
Dept: SURGERY | Facility: CLINIC | Age: 74
DRG: 327 | End: 2024-08-27
Payer: COMMERCIAL

## 2024-08-27 ENCOUNTER — HOSPITAL ENCOUNTER (INPATIENT)
Facility: CLINIC | Age: 74
LOS: 3 days | Discharge: HOME | DRG: 327 | End: 2024-08-30
Attending: SURGERY | Admitting: SURGERY
Payer: COMMERCIAL

## 2024-08-27 DIAGNOSIS — I26.93 SINGLE SUBSEGMENTAL PULMONARY EMBOLISM WITHOUT ACUTE COR PULMONALE (H): ICD-10-CM

## 2024-08-27 DIAGNOSIS — Z98.890 S/P REPAIR OF PARAESOPHAGEAL HERNIA: Primary | ICD-10-CM

## 2024-08-27 DIAGNOSIS — Z87.19 S/P REPAIR OF PARAESOPHAGEAL HERNIA: Primary | ICD-10-CM

## 2024-08-27 LAB
CREAT SERPL-MCNC: 0.74 MG/DL (ref 0.51–0.95)
EGFRCR SERPLBLD CKD-EPI 2021: 84 ML/MIN/1.73M2
GLUCOSE BLDC GLUCOMTR-MCNC: 75 MG/DL (ref 70–99)
HOLD SPECIMEN: NORMAL

## 2024-08-27 PROCEDURE — S2900 ROBOTIC SURGICAL SYSTEM: HCPCS | Performed by: SURGERY

## 2024-08-27 PROCEDURE — 258N000003 HC RX IP 258 OP 636: Performed by: ANESTHESIOLOGY

## 2024-08-27 PROCEDURE — 250N000013 HC RX MED GY IP 250 OP 250 PS 637: Performed by: SURGERY

## 2024-08-27 PROCEDURE — 250N000009 HC RX 250: Performed by: NURSE ANESTHETIST, CERTIFIED REGISTERED

## 2024-08-27 PROCEDURE — 250N000011 HC RX IP 250 OP 636: Performed by: NURSE ANESTHETIST, CERTIFIED REGISTERED

## 2024-08-27 PROCEDURE — 360N000080 HC SURGERY LEVEL 7, PER MIN: Performed by: SURGERY

## 2024-08-27 PROCEDURE — 250N000011 HC RX IP 250 OP 636: Performed by: SURGERY

## 2024-08-27 PROCEDURE — 120N000001 HC R&B MED SURG/OB

## 2024-08-27 PROCEDURE — 0BUT4JZ SUPPLEMENT DIAPHRAGM WITH SYNTHETIC SUBSTITUTE, PERCUTANEOUS ENDOSCOPIC APPROACH: ICD-10-PCS | Performed by: SURGERY

## 2024-08-27 PROCEDURE — 82565 ASSAY OF CREATININE: CPT | Performed by: SURGERY

## 2024-08-27 PROCEDURE — C1781 MESH (IMPLANTABLE): HCPCS | Performed by: SURGERY

## 2024-08-27 PROCEDURE — 272N000001 HC OR GENERAL SUPPLY STERILE: Performed by: SURGERY

## 2024-08-27 PROCEDURE — 258N000003 HC RX IP 258 OP 636: Performed by: NURSE ANESTHETIST, CERTIFIED REGISTERED

## 2024-08-27 PROCEDURE — 370N000017 HC ANESTHESIA TECHNICAL FEE, PER MIN: Performed by: SURGERY

## 2024-08-27 PROCEDURE — 8E0W4CZ ROBOTIC ASSISTED PROCEDURE OF TRUNK REGION, PERCUTANEOUS ENDOSCOPIC APPROACH: ICD-10-PCS | Performed by: SURGERY

## 2024-08-27 PROCEDURE — 36415 COLL VENOUS BLD VENIPUNCTURE: CPT | Performed by: SURGERY

## 2024-08-27 PROCEDURE — 250N000011 HC RX IP 250 OP 636: Performed by: ANESTHESIOLOGY

## 2024-08-27 PROCEDURE — 999N000141 HC STATISTIC PRE-PROCEDURE NURSING ASSESSMENT: Performed by: SURGERY

## 2024-08-27 PROCEDURE — 0DV44ZZ RESTRICTION OF ESOPHAGOGASTRIC JUNCTION, PERCUTANEOUS ENDOSCOPIC APPROACH: ICD-10-PCS | Performed by: SURGERY

## 2024-08-27 PROCEDURE — 710N000010 HC RECOVERY PHASE 1, LEVEL 2, PER MIN: Performed by: SURGERY

## 2024-08-27 PROCEDURE — 43282 LAP PARAESOPH HER RPR W/MESH: CPT | Performed by: SURGERY

## 2024-08-27 DEVICE — ENFORM PREPERITONEAL 10CMX10CM BIOMATERIAL
Type: IMPLANTABLE DEVICE | Site: ABDOMEN | Status: FUNCTIONAL
Brand: GORE ENFORM PREPERITONEAL BIOMATERIAL

## 2024-08-27 RX ORDER — ACETAMINOPHEN 325 MG/1
650 TABLET ORAL EVERY 4 HOURS PRN
Status: DISCONTINUED | OUTPATIENT
Start: 2024-08-30 | End: 2024-08-30 | Stop reason: HOSPADM

## 2024-08-27 RX ORDER — BISACODYL 10 MG
10 SUPPOSITORY, RECTAL RECTAL DAILY PRN
Status: DISCONTINUED | OUTPATIENT
Start: 2024-08-30 | End: 2024-08-30 | Stop reason: HOSPADM

## 2024-08-27 RX ORDER — FENTANYL CITRATE 50 UG/ML
INJECTION, SOLUTION INTRAMUSCULAR; INTRAVENOUS PRN
Status: DISCONTINUED | OUTPATIENT
Start: 2024-08-27 | End: 2024-08-27

## 2024-08-27 RX ORDER — DEXAMETHASONE SODIUM PHOSPHATE 10 MG/ML
4 INJECTION, SOLUTION INTRAMUSCULAR; INTRAVENOUS
Status: CANCELLED | OUTPATIENT
Start: 2024-08-27

## 2024-08-27 RX ORDER — FENTANYL CITRATE 50 UG/ML
25-100 INJECTION, SOLUTION INTRAMUSCULAR; INTRAVENOUS
Status: DISCONTINUED | OUTPATIENT
Start: 2024-08-27 | End: 2024-08-27 | Stop reason: HOSPADM

## 2024-08-27 RX ORDER — HYDRALAZINE HYDROCHLORIDE 20 MG/ML
10 INJECTION INTRAMUSCULAR; INTRAVENOUS ONCE
Status: COMPLETED | OUTPATIENT
Start: 2024-08-27 | End: 2024-08-27

## 2024-08-27 RX ORDER — OXYCODONE HYDROCHLORIDE 5 MG/1
5 TABLET ORAL
Status: CANCELLED | OUTPATIENT
Start: 2024-08-27

## 2024-08-27 RX ORDER — HYDROMORPHONE HCL IN WATER/PF 6 MG/30 ML
0.4 PATIENT CONTROLLED ANALGESIA SYRINGE INTRAVENOUS EVERY 5 MIN PRN
Status: DISCONTINUED | OUTPATIENT
Start: 2024-08-27 | End: 2024-08-27 | Stop reason: HOSPADM

## 2024-08-27 RX ORDER — PROCHLORPERAZINE MALEATE 5 MG
5 TABLET ORAL EVERY 6 HOURS PRN
Status: DISCONTINUED | OUTPATIENT
Start: 2024-08-27 | End: 2024-08-30 | Stop reason: HOSPADM

## 2024-08-27 RX ORDER — ONDANSETRON 4 MG/1
4 TABLET, ORALLY DISINTEGRATING ORAL EVERY 6 HOURS PRN
Status: DISCONTINUED | OUTPATIENT
Start: 2024-08-27 | End: 2024-08-30 | Stop reason: HOSPADM

## 2024-08-27 RX ORDER — BUPIVACAINE HYDROCHLORIDE 2.5 MG/ML
INJECTION, SOLUTION EPIDURAL; INFILTRATION; INTRACAUDAL PRN
Status: DISCONTINUED | OUTPATIENT
Start: 2024-08-27 | End: 2024-08-27 | Stop reason: HOSPADM

## 2024-08-27 RX ORDER — LIDOCAINE 40 MG/G
CREAM TOPICAL
Status: DISCONTINUED | OUTPATIENT
Start: 2024-08-27 | End: 2024-08-27 | Stop reason: HOSPADM

## 2024-08-27 RX ORDER — NALOXONE HYDROCHLORIDE 0.4 MG/ML
0.1 INJECTION, SOLUTION INTRAMUSCULAR; INTRAVENOUS; SUBCUTANEOUS
Status: CANCELLED | OUTPATIENT
Start: 2024-08-27

## 2024-08-27 RX ORDER — BUPIVACAINE HYDROCHLORIDE 2.5 MG/ML
INJECTION, SOLUTION INFILTRATION; PERINEURAL
Status: DISCONTINUED
Start: 2024-08-27 | End: 2024-08-27 | Stop reason: HOSPADM

## 2024-08-27 RX ORDER — HYDROMORPHONE HCL IN WATER/PF 6 MG/30 ML
0.2 PATIENT CONTROLLED ANALGESIA SYRINGE INTRAVENOUS EVERY 5 MIN PRN
Status: DISCONTINUED | OUTPATIENT
Start: 2024-08-27 | End: 2024-08-27 | Stop reason: HOSPADM

## 2024-08-27 RX ORDER — PROPOFOL 10 MG/ML
INJECTION, EMULSION INTRAVENOUS PRN
Status: DISCONTINUED | OUTPATIENT
Start: 2024-08-27 | End: 2024-08-27

## 2024-08-27 RX ORDER — DEXAMETHASONE SODIUM PHOSPHATE 4 MG/ML
INJECTION, SOLUTION INTRA-ARTICULAR; INTRALESIONAL; INTRAMUSCULAR; INTRAVENOUS; SOFT TISSUE PRN
Status: DISCONTINUED | OUTPATIENT
Start: 2024-08-27 | End: 2024-08-27

## 2024-08-27 RX ORDER — LABETALOL HYDROCHLORIDE 5 MG/ML
INJECTION, SOLUTION INTRAVENOUS PRN
Status: DISCONTINUED | OUTPATIENT
Start: 2024-08-27 | End: 2024-08-27

## 2024-08-27 RX ORDER — HYDROMORPHONE HCL IN WATER/PF 6 MG/30 ML
0.2 PATIENT CONTROLLED ANALGESIA SYRINGE INTRAVENOUS
Status: DISCONTINUED | OUTPATIENT
Start: 2024-08-27 | End: 2024-08-29

## 2024-08-27 RX ORDER — ONDANSETRON 2 MG/ML
4 INJECTION INTRAMUSCULAR; INTRAVENOUS EVERY 30 MIN PRN
Status: DISCONTINUED | OUTPATIENT
Start: 2024-08-27 | End: 2024-08-27 | Stop reason: HOSPADM

## 2024-08-27 RX ORDER — PROPOFOL 10 MG/ML
INJECTION, EMULSION INTRAVENOUS CONTINUOUS PRN
Status: DISCONTINUED | OUTPATIENT
Start: 2024-08-27 | End: 2024-08-27

## 2024-08-27 RX ORDER — NALOXONE HYDROCHLORIDE 0.4 MG/ML
0.4 INJECTION, SOLUTION INTRAMUSCULAR; INTRAVENOUS; SUBCUTANEOUS
Status: DISCONTINUED | OUTPATIENT
Start: 2024-08-27 | End: 2024-08-30 | Stop reason: HOSPADM

## 2024-08-27 RX ORDER — ONDANSETRON 2 MG/ML
4 INJECTION INTRAMUSCULAR; INTRAVENOUS EVERY 30 MIN PRN
Status: CANCELLED | OUTPATIENT
Start: 2024-08-27

## 2024-08-27 RX ORDER — NALOXONE HYDROCHLORIDE 0.4 MG/ML
0.2 INJECTION, SOLUTION INTRAMUSCULAR; INTRAVENOUS; SUBCUTANEOUS
Status: DISCONTINUED | OUTPATIENT
Start: 2024-08-27 | End: 2024-08-30 | Stop reason: HOSPADM

## 2024-08-27 RX ORDER — OXYCODONE HYDROCHLORIDE 5 MG/1
10 TABLET ORAL
Status: CANCELLED | OUTPATIENT
Start: 2024-08-27

## 2024-08-27 RX ORDER — GABAPENTIN 100 MG/1
100 CAPSULE ORAL AT BEDTIME
Status: DISCONTINUED | OUTPATIENT
Start: 2024-08-27 | End: 2024-08-30 | Stop reason: HOSPADM

## 2024-08-27 RX ORDER — ONDANSETRON 2 MG/ML
INJECTION INTRAMUSCULAR; INTRAVENOUS PRN
Status: DISCONTINUED | OUTPATIENT
Start: 2024-08-27 | End: 2024-08-27

## 2024-08-27 RX ORDER — ACETAMINOPHEN 325 MG/1
975 TABLET ORAL EVERY 8 HOURS
Status: COMPLETED | OUTPATIENT
Start: 2024-08-27 | End: 2024-08-30

## 2024-08-27 RX ORDER — AMOXICILLIN 250 MG
1 CAPSULE ORAL 2 TIMES DAILY
Status: DISCONTINUED | OUTPATIENT
Start: 2024-08-27 | End: 2024-08-30 | Stop reason: HOSPADM

## 2024-08-27 RX ORDER — VASOPRESSIN IN 0.9 % NACL 2 UNIT/2ML
SYRINGE (ML) INTRAVENOUS PRN
Status: DISCONTINUED | OUTPATIENT
Start: 2024-08-27 | End: 2024-08-27

## 2024-08-27 RX ORDER — NALOXONE HYDROCHLORIDE 0.4 MG/ML
0.1 INJECTION, SOLUTION INTRAMUSCULAR; INTRAVENOUS; SUBCUTANEOUS
Status: DISCONTINUED | OUTPATIENT
Start: 2024-08-27 | End: 2024-08-27 | Stop reason: HOSPADM

## 2024-08-27 RX ORDER — FENTANYL CITRATE 50 UG/ML
25 INJECTION, SOLUTION INTRAMUSCULAR; INTRAVENOUS EVERY 5 MIN PRN
Status: DISCONTINUED | OUTPATIENT
Start: 2024-08-27 | End: 2024-08-27 | Stop reason: HOSPADM

## 2024-08-27 RX ORDER — FENTANYL CITRATE 50 UG/ML
50 INJECTION, SOLUTION INTRAMUSCULAR; INTRAVENOUS EVERY 5 MIN PRN
Status: DISCONTINUED | OUTPATIENT
Start: 2024-08-27 | End: 2024-08-27 | Stop reason: HOSPADM

## 2024-08-27 RX ORDER — ENOXAPARIN SODIUM 100 MG/ML
40 INJECTION SUBCUTANEOUS EVERY 24 HOURS
Status: DISCONTINUED | OUTPATIENT
Start: 2024-08-28 | End: 2024-08-28

## 2024-08-27 RX ORDER — POLYETHYLENE GLYCOL 3350 17 G/17G
17 POWDER, FOR SOLUTION ORAL DAILY
Status: DISCONTINUED | OUTPATIENT
Start: 2024-08-28 | End: 2024-08-30 | Stop reason: HOSPADM

## 2024-08-27 RX ORDER — ONDANSETRON 4 MG/1
4 TABLET, ORALLY DISINTEGRATING ORAL EVERY 30 MIN PRN
Status: CANCELLED | OUTPATIENT
Start: 2024-08-27

## 2024-08-27 RX ORDER — ONDANSETRON 4 MG/1
4 TABLET, ORALLY DISINTEGRATING ORAL EVERY 30 MIN PRN
Status: DISCONTINUED | OUTPATIENT
Start: 2024-08-27 | End: 2024-08-27 | Stop reason: HOSPADM

## 2024-08-27 RX ORDER — HYDROMORPHONE HCL IN WATER/PF 6 MG/30 ML
0.4 PATIENT CONTROLLED ANALGESIA SYRINGE INTRAVENOUS
Status: DISCONTINUED | OUTPATIENT
Start: 2024-08-27 | End: 2024-08-29

## 2024-08-27 RX ORDER — TRAMADOL HYDROCHLORIDE 50 MG/1
50 TABLET ORAL EVERY 6 HOURS PRN
Status: DISCONTINUED | OUTPATIENT
Start: 2024-08-27 | End: 2024-08-30 | Stop reason: HOSPADM

## 2024-08-27 RX ORDER — LIDOCAINE HYDROCHLORIDE 10 MG/ML
INJECTION, SOLUTION INFILTRATION; PERINEURAL PRN
Status: DISCONTINUED | OUTPATIENT
Start: 2024-08-27 | End: 2024-08-27

## 2024-08-27 RX ORDER — SODIUM CHLORIDE, SODIUM LACTATE, POTASSIUM CHLORIDE, CALCIUM CHLORIDE 600; 310; 30; 20 MG/100ML; MG/100ML; MG/100ML; MG/100ML
INJECTION, SOLUTION INTRAVENOUS CONTINUOUS
Status: DISCONTINUED | OUTPATIENT
Start: 2024-08-27 | End: 2024-08-27 | Stop reason: HOSPADM

## 2024-08-27 RX ORDER — DEXAMETHASONE SODIUM PHOSPHATE 4 MG/ML
4 INJECTION, SOLUTION INTRA-ARTICULAR; INTRALESIONAL; INTRAMUSCULAR; INTRAVENOUS; SOFT TISSUE
Status: DISCONTINUED | OUTPATIENT
Start: 2024-08-27 | End: 2024-08-27 | Stop reason: HOSPADM

## 2024-08-27 RX ORDER — LIDOCAINE 40 MG/G
CREAM TOPICAL
Status: DISCONTINUED | OUTPATIENT
Start: 2024-08-27 | End: 2024-08-30 | Stop reason: HOSPADM

## 2024-08-27 RX ORDER — ONDANSETRON 2 MG/ML
4 INJECTION INTRAMUSCULAR; INTRAVENOUS EVERY 6 HOURS PRN
Status: DISCONTINUED | OUTPATIENT
Start: 2024-08-27 | End: 2024-08-30 | Stop reason: HOSPADM

## 2024-08-27 RX ADMIN — PROPOFOL 200 MCG/KG/MIN: 10 INJECTION, EMULSION INTRAVENOUS at 11:46

## 2024-08-27 RX ADMIN — PHENYLEPHRINE HYDROCHLORIDE 100 MCG: 10 INJECTION INTRAVENOUS at 14:05

## 2024-08-27 RX ADMIN — FENTANYL CITRATE 50 MCG: 50 INJECTION INTRAMUSCULAR; INTRAVENOUS at 11:53

## 2024-08-27 RX ADMIN — LABETALOL HYDROCHLORIDE 5 MG: 5 INJECTION, SOLUTION INTRAVENOUS at 12:10

## 2024-08-27 RX ADMIN — LIDOCAINE HYDROCHLORIDE 3 ML: 10 INJECTION, SOLUTION INFILTRATION; PERINEURAL at 14:14

## 2024-08-27 RX ADMIN — ROCURONIUM BROMIDE 10 MG: 10 INJECTION, SOLUTION INTRAVENOUS at 13:31

## 2024-08-27 RX ADMIN — SODIUM CHLORIDE, POTASSIUM CHLORIDE, SODIUM LACTATE AND CALCIUM CHLORIDE: 600; 310; 30; 20 INJECTION, SOLUTION INTRAVENOUS at 13:02

## 2024-08-27 RX ADMIN — Medication 100 MG: at 11:47

## 2024-08-27 RX ADMIN — GABAPENTIN 100 MG: 100 CAPSULE ORAL at 21:39

## 2024-08-27 RX ADMIN — ONDANSETRON 4 MG: 2 INJECTION INTRAMUSCULAR; INTRAVENOUS at 11:53

## 2024-08-27 RX ADMIN — PHENYLEPHRINE HYDROCHLORIDE 300 MCG: 10 INJECTION INTRAVENOUS at 12:40

## 2024-08-27 RX ADMIN — ONDANSETRON 4 MG: 2 INJECTION INTRAMUSCULAR; INTRAVENOUS at 14:25

## 2024-08-27 RX ADMIN — Medication 1 UNITS: at 13:31

## 2024-08-27 RX ADMIN — LIDOCAINE HYDROCHLORIDE 2 ML: 10 INJECTION, SOLUTION INFILTRATION; PERINEURAL at 12:08

## 2024-08-27 RX ADMIN — PROPOFOL 200 MG: 10 INJECTION, EMULSION INTRAVENOUS at 11:46

## 2024-08-27 RX ADMIN — DEXAMETHASONE SODIUM PHOSPHATE 4 MG: 4 INJECTION, SOLUTION INTRA-ARTICULAR; INTRALESIONAL; INTRAMUSCULAR; INTRAVENOUS; SOFT TISSUE at 11:53

## 2024-08-27 RX ADMIN — PHENYLEPHRINE HYDROCHLORIDE 500 MCG: 10 INJECTION INTRAVENOUS at 12:38

## 2024-08-27 RX ADMIN — Medication 2 UNITS: at 12:41

## 2024-08-27 RX ADMIN — HYDRALAZINE HYDROCHLORIDE 10 MG: 20 INJECTION INTRAMUSCULAR; INTRAVENOUS at 16:01

## 2024-08-27 RX ADMIN — ROCURONIUM BROMIDE 10 MG: 10 INJECTION, SOLUTION INTRAVENOUS at 14:00

## 2024-08-27 RX ADMIN — HYDRALAZINE HYDROCHLORIDE 10 MG: 20 INJECTION INTRAMUSCULAR; INTRAVENOUS at 16:16

## 2024-08-27 RX ADMIN — PHENYLEPHRINE HYDROCHLORIDE 100 MCG: 10 INJECTION INTRAVENOUS at 13:02

## 2024-08-27 RX ADMIN — HYDROMORPHONE HYDROCHLORIDE 1 MG: 1 INJECTION, SOLUTION INTRAMUSCULAR; INTRAVENOUS; SUBCUTANEOUS at 12:08

## 2024-08-27 RX ADMIN — FENTANYL CITRATE 50 MCG: 50 INJECTION INTRAMUSCULAR; INTRAVENOUS at 11:46

## 2024-08-27 RX ADMIN — LIDOCAINE HYDROCHLORIDE 5 ML: 10 INJECTION, SOLUTION INFILTRATION; PERINEURAL at 11:46

## 2024-08-27 RX ADMIN — SENNOSIDES AND DOCUSATE SODIUM 1 TABLET: 50; 8.6 TABLET ORAL at 21:39

## 2024-08-27 RX ADMIN — ROCURONIUM BROMIDE 50 MG: 10 INJECTION, SOLUTION INTRAVENOUS at 11:55

## 2024-08-27 RX ADMIN — TRAMADOL HYDROCHLORIDE 50 MG: 50 TABLET, COATED ORAL at 23:57

## 2024-08-27 RX ADMIN — FENTANYL CITRATE 25 MCG: 50 INJECTION, SOLUTION INTRAMUSCULAR; INTRAVENOUS at 15:20

## 2024-08-27 RX ADMIN — ROCURONIUM BROMIDE 10 MG: 10 INJECTION, SOLUTION INTRAVENOUS at 13:03

## 2024-08-27 RX ADMIN — SUGAMMADEX 200 MG: 100 INJECTION, SOLUTION INTRAVENOUS at 14:24

## 2024-08-27 RX ADMIN — Medication 1 UNITS: at 13:10

## 2024-08-27 RX ADMIN — SODIUM CHLORIDE, POTASSIUM CHLORIDE, SODIUM LACTATE AND CALCIUM CHLORIDE: 600; 310; 30; 20 INJECTION, SOLUTION INTRAVENOUS at 10:21

## 2024-08-27 RX ADMIN — SODIUM CHLORIDE, POTASSIUM CHLORIDE, SODIUM LACTATE AND CALCIUM CHLORIDE: 600; 310; 30; 20 INJECTION, SOLUTION INTRAVENOUS at 16:07

## 2024-08-27 RX ADMIN — ACETAMINOPHEN 975 MG: 325 TABLET ORAL at 21:39

## 2024-08-27 ASSESSMENT — ACTIVITIES OF DAILY LIVING (ADL)
ADLS_ACUITY_SCORE: 23
ADLS_ACUITY_SCORE: 25
ADLS_ACUITY_SCORE: 27
ADLS_ACUITY_SCORE: 27
ADLS_ACUITY_SCORE: 23
ADLS_ACUITY_SCORE: 25
ADLS_ACUITY_SCORE: 23
ADLS_ACUITY_SCORE: 27
ADLS_ACUITY_SCORE: 23
ADLS_ACUITY_SCORE: 27

## 2024-08-27 NOTE — INTERVAL H&P NOTE
I have reviewed the surgical (or preoperative) H&P that is linked to this encounter, and examined the patient. There are no significant changes    Plan for Procedure(s):  paraesophageal hernia repair with mesh and partial fundoplication, ROBOT-ASSISTED, LAPAROSCOPIC     Randolph Farr [facility] Surgery  [phone]

## 2024-08-27 NOTE — ANESTHESIA POSTPROCEDURE EVALUATION
Patient: Lori Liu    Procedure: Procedure(s):  paraesophageal hernia repair with mesh and partial fundoplication, ROBOT-ASSISTED, LAPAROSCOPIC       Anesthesia Type:  General    Note:  Disposition: Inpatient   Postop Pain Control: Uneventful            Sign Out: Well controlled pain   PONV: No   Neuro/Psych: Uneventful            Sign Out: Acceptable/Baseline neuro status   Airway/Respiratory: Uneventful            Sign Out: Acceptable/Baseline resp. status   CV/Hemodynamics: Uneventful            Sign Out: Acceptable CV status; No obvious hypovolemia; No obvious fluid overload   Other NRE: NONE   DID A NON-ROUTINE EVENT OCCUR?            Last vitals:  Vitals Value Taken Time   /67 08/27/24 1620   Temp 35.9  C (96.62  F) 08/27/24 1624   Pulse 83 08/27/24 1620   Resp 22 08/27/24 1620   SpO2 95 % 08/27/24 1620   Vitals shown include unfiled device data.    Electronically Signed By: Lj Stevens MD  August 27, 2024  4:38 PM

## 2024-08-27 NOTE — ANESTHESIA CARE TRANSFER NOTE
Patient: Lori Liu    Procedure: Procedure(s):  paraesophageal hernia repair with mesh and partial fundoplication, ROBOT-ASSISTED, LAPAROSCOPIC       Diagnosis: Hiatal hernia [K44.9]  Diagnosis Additional Information: No value filed.    Anesthesia Type:   General     Note:    Oropharynx: oropharynx clear of all foreign objects  Level of Consciousness: drowsy  Oxygen Supplementation: face mask  Level of Supplemental Oxygen (L/min / FiO2): 6  Independent Airway: airway patency satisfactory and stable  Dentition: dentition unchanged  Vital Signs Stable: post-procedure vital signs reviewed and stable  Report to RN Given: handoff report given  Patient transferred to: PACU    Handoff Report: Identifed the Patient, Identified the Reponsible Provider, Reviewed the pertinent medical history, Discussed the surgical course, Reviewed Intra-OP anesthesia mangement and issues during anesthesia, Set expectations for post-procedure period and Allowed opportunity for questions and acknowledgement of understanding    Vitals:  Vitals Value Taken Time   /75 08/27/24 1437   Temp 36.2  C (97.16  F) 08/27/24 1438   Pulse 79 08/27/24 1438   Resp 24    SpO2 95 % 08/27/24 1438   Vitals shown include unfiled device data.    Electronically Signed By: STACIA Davenport CRNA  August 27, 2024  2:40 PM

## 2024-08-27 NOTE — ANESTHESIA PROCEDURE NOTES
Airway       Patient location during procedure: OR       Procedure Start/Stop Times: 8/27/2024 11:50 AM  Staff -        Anesthesiologist:  Lj Stevens MD       CRNA: Katarzyna Burroughs APRN CRNA       Performed By: CRNA  Consent for Airway        Urgency: elective  Indications and Patient Condition       Indications for airway management: dina-procedural       Induction type:intravenous       Mask difficulty assessment: 0 - not attempted    Final Airway Details       Final airway type: endotracheal airway       Successful airway: ETT - single  Endotracheal Airway Details        ETT size (mm): 7.0       Cuffed: yes       Cuff volume (mL): 7       Successful intubation technique: direct laryngoscopy       DL Blade Type: Nunez 2       Grade View of Cords: 1       Adjucts: stylet       Position: Right       Measured from: gums/teeth       Secured at (cm): 21       Bite block used: None    Post intubation assessment        Placement verified by: capnometry, equal breath sounds and chest rise        Number of attempts at approach: 1       Number of other approaches attempted: 0       Secured with: tape       Ease of procedure: easy    Medication(s) Administered   Medication Administration Time: 8/27/2024 11:50 AM

## 2024-08-27 NOTE — ANESTHESIA PREPROCEDURE EVALUATION
Anesthesia Pre-Procedure Evaluation    Patient: Lori Liu   MRN: [id number] : 1950        Procedure : Procedure(s):  paraesophageal hernia repair with mesh and partial fundoplication, ROBOT-ASSISTED, LAPAROSCOPIC          Past Medical History:   Diagnosis Date    Anemia, iron deficiency     Antiplatelet or antithrombotic long-term use     Gastroesophageal reflux disease     Gastroesophageal reflux disease with esophagitis without hemorrhage     Mixed hyperlipidemia     SHASHI (obstructive sleep apnea)     Osteoarthritis of both knees     Pulmonary embolism (H)     on lifelong anticoagulation    Thrombosis       History reviewed. No pertinent surgical history.   Allergies   Allergen Reactions    Cephalexin      Other reaction(s): Itching, itching, rash    Penicillins Hives and Itching      Social History     Tobacco Use    Smoking status: Former     Types: Cigarettes    Smokeless tobacco: Never   Substance Use Topics    Alcohol use: Never      Wt Readings from Last 1 Encounters:   24 104.3 kg (230 lb)        Anesthesia Evaluation        History of anesthetic complications       ROS/MED HX  ENT/Pulmonary: Comment: Pulmonary emboli (H) - neg pulmonary ROS   (+) sleep apnea, uses CPAP,                                      Neurologic:  - neg neurologic ROS     Cardiovascular:  - neg cardiovascular ROS   (+) Dyslipidemia - -   -  - -   Taking blood thinners                                   METS/Exercise Tolerance:     Hematologic:  - neg hematologic  ROS   (+) History of blood clots,               Musculoskeletal:  - neg musculoskeletal ROS     GI/Hepatic:  - neg GI/hepatic ROS   (+) GERD,     hiatal hernia,              Renal/Genitourinary:  - neg Renal ROS     Endo:  - neg endo ROS   (+)               Obesity,       Psychiatric/Substance Use:  - neg psychiatric ROS     Infectious Disease:  - neg infectious disease ROS     Malignancy:  - neg malignancy ROS     Other:  - neg other ROS     "      Physical Exam    Airway  airway exam normal      Mallampati: II   TM distance: > 3 FB   Neck ROM: full   Mouth opening: > 3 cm    Respiratory Devices and Support         Dental  no notable dental history         Cardiovascular   cardiovascular exam normal       Rhythm and rate: regular     Pulmonary   pulmonary exam normal        breath sounds clear to auscultation           OUTSIDE LABS:  CBC:   Lab Results   Component Value Date    WBC 6.1 10/20/2021    WBC 8.2 10/19/2021    HGB 13.9 04/01/2024    HGB 14.9 07/28/2022    HCT 34.2 (L) 10/20/2021    HCT 36.7 10/19/2021     10/20/2021     10/19/2021     BMP:   Lab Results   Component Value Date     12/15/2023     02/27/2023    POTASSIUM 3.7 12/15/2023    POTASSIUM 4.1 02/27/2023    CHLORIDE 109 (H) 12/15/2023    CHLORIDE 105 02/27/2023    CO2 23 12/15/2023    CO2 24 02/27/2023    BUN 11.2 12/15/2023    BUN 14.2 02/27/2023    CR 0.8 01/02/2024    CR 0.77 12/15/2023    GLC 75 08/27/2024    GLC 90 12/15/2023     COAGS:   Lab Results   Component Value Date    INR 1.30 (H) 08/08/2021     POC: No results found for: \"BGM\", \"HCG\", \"HCGS\"  HEPATIC:   Lab Results   Component Value Date    ALBUMIN 3.1 (L) 10/20/2021    PROTTOTAL 6.0 10/20/2021    ALT <9 10/20/2021    AST 11 10/20/2021    ALKPHOS 39 (L) 10/20/2021    BILITOTAL 0.6 10/20/2021     OTHER:   Lab Results   Component Value Date    PH 7.44 10/20/2021    PEDRO 8.9 12/15/2023    TSH 0.56 02/05/2021    SED 16 02/27/2023       Anesthesia Plan    ASA Status:  3    NPO Status:  NPO Appropriate    Anesthesia Type: General.     - Airway: ETT   Induction: RSI, Intravenous.   Maintenance: Balanced.        Consents    Anesthesia Plan(s) and associated risks, benefits, and realistic alternatives discussed. Questions answered and patient/representative(s) expressed understanding.     - Discussed: Risks, Benefits and Alternatives for BOTH SEDATION and the PROCEDURE were discussed     - Discussed with:  " "Patient      - Extended Intubation/Ventilatory Support Discussed: No.           Postoperative Care    Pain management: IV analgesics, Oral pain medications.   PONV prophylaxis: Ondansetron (or other 5HT-3), Dexamethasone or Solumedrol     Comments:               Lj Stevens MD    I have reviewed the pertinent notes and labs in the chart from the past 30 days and (re)examined the patient.  Any updates or changes from those notes are reflected in this note.            # Drug Induced Coagulation Defect: home medication list includes an anticoagulant medication   # Severe Obesity: Estimated body mass index is 40.74 kg/m  as calculated from the following:    Height as of this encounter: 1.6 m (5' 3\").    Weight as of this encounter: 104.3 kg (230 lb).      "

## 2024-08-27 NOTE — OP NOTE
Name:  Lori A Noe  PCP:  Sally Coulter (Inactive)  Procedure Date:  8/27/2024      Procedure(s):  paraesophageal hernia repair with mesh and partial fundoplication, ROBOT-ASSISTED, LAPAROSCOPIC    Pre-Procedure Diagnosis:  Hiatal hernia [K44.9]     Post-Procedure Diagnosis:    Paraesophageal hernia    Surgeon(s):  Lisandro Farr DO    Circulator: Alba Michel RN; Paula Lewis RN; Solis Carmen RN  Scrub Person: Lynn Smith; Nenita Hussein; Ana Rhoades; Aretha Stanton    Anesthesia Type:  GET      Findings:  Giant paraesophageal hernia with intrathoracic stomach with chronic organoaxial rotation    Operative Report:    The Patient was taken back to the operating room and placed in a supine position.  Endotracheal intubation was performed.  The abdomen was prepped and draped in a sterile fashion.  I  made an 8 mm incision.  I then establish pneumoperitoneum using a Veress needle technique.  Once this was complete I placed an 8 mm trocar with a 5 mm 30 degree camera into the abdomen.  I scrutinized the surrounding structures for any injuries upon entry none were found.  Next, I placed a Thierno liver retractor via a 5 mm subxiphoid incision.  This was secured to the bed in the standard fashion.  I then placed 3 additional 8 mm trochars in the abdomen.  One in the right upper quadrant and 2 in the left upper quadrant. Enform absorbable mesh was placed into the abdomen.   The robot was docked in the standard fashion once the patient was placed in reverse Trendelenburg.  Our PA Kemal Mckenzie was present throughout the entire case and instrumental in instrument exchange and closure.    I then proceeded to address the hiatus.  There was a giant paraesophageal hernia with a complete intrathoracic stomach.  There was evidence that there was chronic organoaxial rotation as well.  I took down the pars lucidum and spared the vessels and gastrohepatic ligament.  I then  entered the avascular plane between the hernia sac and the crura and circumferentially dissected out the hernia sac using vessel sealing device.  Once I came to the left navin I then turned my attention to the greater curvature of the stomach.  I took down the short gastrics with the vessel sealing device all the way up to the angle of His.  I then created a retroesophageal window and placed 1/4 inch Penrose through this for esophageal retraction.  The posterior sac was also dissected free as was the large lipoma in the retroesophageal space.  I ensured a wide and deep mediastinal dissection of the periesophageal tissue taking care as to preserve the vagus nerves.  Once this was complete I then reapproximated the left and right crura with a 0 nonabsorbable V lock suture in a running fashion.  I placed 2 additional sutures on the anterior left and anterior right portion of the diaphragm to close up the excess hernia defect.  This was done with 0 Ethibond sutures in an interrupted fashion.  I then placed the previously fashioned  Enform mesh into the retroesophageal window and sutured this in place for extra added support with additional 2-0 Vicryl sutures.  The mesh was wrapped around the esophagus without stricturing or narrowing in a key hole fashion.  Once this was in place I then created a posterior toupee fundoplication.  This was done with adequate abdominal esophageal length and no evidence of gastric or esophageal retraction back into the chest cavity.  The fundoplication was completed with 0 Ethibond sutures in interrupted fashion.  I placed an additional suture through the posterior wrap to the diaphragm on the left to pexied the stomach to the diaphragm as a precaution.  Once this was complete all nonabsorbable material was removed.  The robot was undocked and Exparel local anesthetic was injected into all port sites.  The liver tractor was removed. I then removed all trochars and reapproximated skin edges  with 4-0 Monocryl suture.  The abdomen was cleaned and all wounds were cleaned and covered with Steri-Strips and Band-Aids.  The Sierra catheter was removed the patient was extubated sent to the PACU to undergo recovery.  All lap counts and needle counts were correct at the end of the procedure.    Estimated Blood Loss:   15 cc    Specimens:    * No specimens in log *       Drains:        Complications:    None    Lisandro Farr DO

## 2024-08-27 NOTE — PHARMACY-ADMISSION MEDICATION HISTORY
Pharmacist Admission Medication History    Admission medication history is complete. The information provided in this note is only as accurate as the sources available at the time of the update.    Information Source(s): Patient and CareEverywhere/SureScripts via in-person    Pertinent Information:      Changes made to PTA medication list:  Added: None  Deleted: None  Changed: Omeprazole    Allergies reviewed with patient and updates made in EHR: yes    Medication History Completed By: Syd Dan [facility] 8/27/2024 10:23 AM    PTA Med List   Medication Sig Note Last Dose    cyanocobalamin (VITAMIN B-12) 500 MCG tablet Take 1,000 mcg by mouth daily  8/20/2024 at am    omeprazole (PRILOSEC) 20 MG DR capsule Take 20 mg by mouth 2 times daily.  8/26/2024 at pm    rivaroxaban ANTICOAGULANT (XARELTO ANTICOAGULANT) 20 MG TABS tablet Take 20 mg by mouth daily (with dinner) 8/21/2024: Patient states she only has 1 pill left for her xarlto today 8-.  Writer advised patient that her last day when we would like her to take her xarelto would be Friday 8-.  Advised patient to contact her primary MD to see if they wanted to send a prescription to her local pharmacy for the xarelto (since she does mail order) or whatever they decide since she will have 2 days of no coverage.  Patient verbalized understanding and will co 8/23/2024 at pm    rosuvastatin (CRESTOR) 20 MG tablet Take 20 mg by mouth daily  8/26/2024 at am    VITRON-C  MG TABS tablet Take 1 tablet by mouth daily  8/20/2024 at am

## 2024-08-28 LAB
GLUCOSE BLDC GLUCOMTR-MCNC: 117 MG/DL (ref 70–99)
HOLD SPECIMEN: NORMAL
PLATELET # BLD AUTO: 210 10E3/UL (ref 150–450)

## 2024-08-28 PROCEDURE — 99024 POSTOP FOLLOW-UP VISIT: CPT | Performed by: PHYSICIAN ASSISTANT

## 2024-08-28 PROCEDURE — 250N000013 HC RX MED GY IP 250 OP 250 PS 637: Performed by: SURGERY

## 2024-08-28 PROCEDURE — 36415 COLL VENOUS BLD VENIPUNCTURE: CPT | Performed by: SURGERY

## 2024-08-28 PROCEDURE — 85049 AUTOMATED PLATELET COUNT: CPT | Performed by: SURGERY

## 2024-08-28 PROCEDURE — G0378 HOSPITAL OBSERVATION PER HR: HCPCS

## 2024-08-28 PROCEDURE — 120N000001 HC R&B MED SURG/OB

## 2024-08-28 RX ORDER — AMOXICILLIN 250 MG
1 CAPSULE ORAL 2 TIMES DAILY
Qty: 60 TABLET | Refills: 0 | Status: SHIPPED | OUTPATIENT
Start: 2024-08-28 | End: 2024-09-27

## 2024-08-28 RX ORDER — TRAMADOL HYDROCHLORIDE 50 MG/1
50 TABLET ORAL EVERY 6 HOURS PRN
Qty: 12 TABLET | Refills: 0 | Status: SHIPPED | OUTPATIENT
Start: 2024-08-28

## 2024-08-28 RX ORDER — ACETAMINOPHEN 325 MG/1
650-975 TABLET ORAL EVERY 6 HOURS PRN
Qty: 100 TABLET | Refills: 0 | Status: SHIPPED | OUTPATIENT
Start: 2024-08-30 | End: 2024-09-29

## 2024-08-28 RX ADMIN — ACETAMINOPHEN 975 MG: 325 TABLET ORAL at 05:39

## 2024-08-28 RX ADMIN — GABAPENTIN 100 MG: 100 CAPSULE ORAL at 20:47

## 2024-08-28 RX ADMIN — ACETAMINOPHEN 975 MG: 325 TABLET ORAL at 14:41

## 2024-08-28 RX ADMIN — POLYETHYLENE GLYCOL 3350 17 G: 17 POWDER, FOR SOLUTION ORAL at 09:16

## 2024-08-28 RX ADMIN — SENNOSIDES AND DOCUSATE SODIUM 1 TABLET: 50; 8.6 TABLET ORAL at 09:16

## 2024-08-28 RX ADMIN — ACETAMINOPHEN 975 MG: 325 TABLET ORAL at 20:46

## 2024-08-28 RX ADMIN — SENNOSIDES AND DOCUSATE SODIUM 1 TABLET: 50; 8.6 TABLET ORAL at 20:47

## 2024-08-28 RX ADMIN — RIVAROXABAN 20 MG: 10 TABLET, FILM COATED ORAL at 17:45

## 2024-08-28 ASSESSMENT — ACTIVITIES OF DAILY LIVING (ADL)
ADLS_ACUITY_SCORE: 27

## 2024-08-28 NOTE — PLAN OF CARE
Pt A&Ox4 and VSS. Denies chest pain and denies SOB. Up to the bathroom to void on shift. Still on 1L of oxygen and 87% to 85% on room air. Tolerating full liquid diet. Walked around unit on shift. Refused ice pack on surgical site. Surgical dressing are clean, dry and intact. Surgery states pt needs to be off oxygen to go home. Call light within reach.     Problem: Adult Inpatient Plan of Care  Goal: Absence of Hospital-Acquired Illness or Injury  Intervention: Prevent Skin Injury  Recent Flowsheet Documentation  Taken 8/28/2024 0851 by Irma Mar, RN  Body Position: position changed independently     Problem: Adult Inpatient Plan of Care  Goal: Absence of Hospital-Acquired Illness or Injury  Intervention: Prevent Infection  Recent Flowsheet Documentation  Taken 8/28/2024 0851 by Irma Mar, RN  Infection Prevention: hand hygiene promoted   Goal Outcome Evaluation:

## 2024-08-28 NOTE — PLAN OF CARE
Pt arrived to the floor from PACU A&Ox4 with surgical incisions CDI with steri strips and bandaids. She was able to ambulate to the bathroom and void with assistance x1 and gaitbelt. Her pain in her upper shoulders/chest has improved from when she was in the PACU. Otherwise she denied a need for any PRN pain medications. Bed in lowest position, call light within reach, and bed alarm armed.         Goal Outcome Evaluation:      Plan of Care Reviewed With: patient    Overall Patient Progress: improvingOverall Patient Progress: improving       Problem: Adult Inpatient Plan of Care  Goal: Plan of Care Review  Description: The Plan of Care Review/Shift note should be completed every shift.  The Outcome Evaluation is a brief statement about your assessment that the patient is improving, declining, or no change.  This information will be displayed automatically on your shift  note.  Outcome: Progressing  Flowsheets (Taken 8/27/2024 1929)  Plan of Care Reviewed With: patient  Overall Patient Progress: improving  Goal: Absence of Hospital-Acquired Illness or Injury  Intervention: Identify and Manage Fall Risk  Recent Flowsheet Documentation  Taken 8/27/2024 1734 by Marissa Jeffers, RN  Safety Promotion/Fall Prevention: assistive device/personal items within reach  Goal: Optimal Comfort and Wellbeing  Outcome: Progressing  Goal: Readiness for Transition of Care  Intervention: Mutually Develop Transition Plan  Recent Flowsheet Documentation  Taken 8/27/2024 1700 by Marissa Jeffers, RN  Equipment Currently Used at Home: none         Problem: Risk for Delirium  Goal: Improved Behavioral Control  Outcome: Progressing  Goal: Improved Attention and Thought Clarity  Outcome: Progressing

## 2024-08-28 NOTE — PROGRESS NOTES
General Surgery Progress Note:    Hospital Day # 1    ASSESSMENT:  Paraesophageal hernia    Lori Liu is a 73 year old female who is s/p robot-assisted, laparoscopic paraesophageal hernia repair with mesh and partial fundoplication on 8/27/24 with Dr. Farr. Patient doing well post-operatively. Pain adequately controlled. Tolerating a full liquid diet without nausea, vomiting. Anticipate patient will be ready for discharge this afternoon.    PLAN:  - Full liquid diet. She was instructed to remain on a full liquid/pureed diet for the first week post-operatively. Fundoplication diet handout provided to patient  - PO pain control  - Wean O2 as able  - Increase activity and encourage ambulation to promote bowel function  - Likely discharge home this afternoon pending pain control, ambulation and off supplemental O2    SUBJECTIVE:   She is feeling well this morning. Main complaint is upper back/shoulder discomfort. Denies chest pain, shortness of breath, dyspnea, abdominal pain, fever, chills, nausea, vomiting. Tolerating a full liquid diet without issues. Has not been out of bed yet today. Voiding independently.       Patient Vitals for the past 24 hrs:   BP Temp Temp src Pulse Resp SpO2 Weight   08/28/24 1001 -- -- -- -- -- 95 % --   08/28/24 0914 -- -- -- -- -- (!) 87 % --   08/28/24 0740 133/73 98.1  F (36.7  C) Oral 68 22 93 % 104.1 kg (229 lb 9.6 oz)   08/27/24 2343 134/75 97.7  F (36.5  C) Oral 88 26 95 % --   08/27/24 2000 (!) 160/80 99  F (37.2  C) Oral 89 22 97 % --   08/27/24 1900 (!) 164/95 -- -- 87 18 96 % --   08/27/24 1800 (!) 154/76 -- -- 87 18 95 % --   08/27/24 1730 (!) 150/72 97.5  F (36.4  C) Oral 82 17 96 % --   08/27/24 1700 (!) 147/74 97.7  F (36.5  C) Oral 88 20 93 % --   08/27/24 1620 (!) 148/67 97.3  F (36.3  C) -- 83 22 95 % --   08/27/24 1610 (!) 177/80 97.2  F (36.2  C) -- 80 20 95 % --   08/27/24 1606 (!) 185/87 97  F (36.1  C) -- 78 27 94 % --   08/27/24 1604 (!) 184/87 97  F  (36.1  C) -- 77 18 95 % --   08/27/24 1601 (!) 196/101 -- -- -- -- -- --   08/27/24 1600 (!) 196/101 96.8  F (36  C) -- 74 20 96 % --   08/27/24 1550 (!) 180/98 96.8  F (36  C) -- 77 28 94 % --   08/27/24 1540 (!) 181/90 96.8  F (36  C) -- 75 (!) 31 96 % --   08/27/24 1530 (!) 174/98 96.8  F (36  C) -- 78 (!) 34 95 % --   08/27/24 1510 (!) 171/87 96.8  F (36  C) -- 73 27 96 % --   08/27/24 1500 (!) 169/78 96.8  F (36  C) -- 74 28 100 % --   08/27/24 1450 (!) 160/74 97  F (36.1  C) -- 76 27 98 % --   08/27/24 1440 (!) 154/74 97  F (36.1  C) -- 77 -- 94 % --   08/27/24 1430 132/75 97.8  F (36.6  C) Core 80 (!) 33 96 % --         PHYSICAL EXAM:  General: patient seen resting in bed, no acute distress  Resp: no respiratory distress, breathing comfortably on 1L via nasal canula  Abdomen: Soft, non-tender, non-distended. Lap sites clean/dry/intact with steri strips and overlying Band-Aids.  Extremities: warm and well perfused    08/27 0700 - 08/28 0659  In: 1900 [I.V.:1900]  Out: 400 [Urine:400]    Admission on 08/27/2024   Component Date Value    GLUCOSE BY METER POCT 08/27/2024 75     Creatinine 08/27/2024 0.74     GFR Estimate 08/27/2024 84     Hold Specimen 08/27/2024 [facility]     Platelet Count 08/28/2024 210     GLUCOSE BY METER POCT 08/28/2024 117 (H)     Hold Specimen 08/28/2024 [facility]         Natalya Mckenzie PA-C  [facility]  Surgery [address]  [place] [address]?  Office: [phone]

## 2024-08-28 NOTE — PROGRESS NOTES
SPIRITUAL HEALTH SERVICES Progress Note    Saw pt Lori Liu and offered [employer] of anointing for the healing of the sick.     Fr. Ty Olivo

## 2024-08-28 NOTE — PLAN OF CARE
"Pt A&Ox4, VSS, RA, Afebrile.  Pt denies any SOB or n/v.  Pt a1 with wgb  Tolerating full liquid no carbonation diet  PIV patent & SL.  Pt care ongoing, call light within reach, bed in lowest position, alarms on for safety. Discharge pending.      Problem: Adult Inpatient Plan of Care  Goal: Plan of Care Review  Description: The Plan of Care Review/Shift note should be completed every shift.  The Outcome Evaluation is a brief statement about your assessment that the patient is improving, declining, or no change.  This information will be displayed automatically on your shift  note.  Outcome: Progressing  Goal: Patient-Specific Goal (Individualized)  Description: You can add care plan individualizations to a care plan. Examples of Individualization might be:  \"Parent requests to be called daily at 9am for status\", \"I have a hard time hearing out of my right ear\", or \"Do not touch me to wake me up as it startles  me\".  Outcome: Progressing  Goal: Absence of Hospital-Acquired Illness or Injury  Outcome: Progressing  Intervention: Identify and Manage Fall Risk  Recent Flowsheet Documentation  Taken 8/27/2024 2315 by Ashlie Ingram, RN  Safety Promotion/Fall Prevention: assistive device/personal items within reach  Taken 8/27/2024 2200 by Ashlie Ingram, RN  Safety Promotion/Fall Prevention: safety round/check completed  Taken 8/27/2024 2100 by Ashlie Ingram, RN  Safety Promotion/Fall Prevention: safety round/check completed  Taken 8/27/2024 2000 by Ashlie Ingram, RN  Safety Promotion/Fall Prevention: safety round/check completed  Taken 8/27/2024 1900 by Ashlie Ingram, RN  Safety Promotion/Fall Prevention: safety round/check completed  Intervention: Prevent Skin Injury  Recent Flowsheet Documentation  Taken 8/27/2024 2315 by Ashlie Ingram, RN  Body Position: position changed independently  Intervention: Prevent and Manage VTE (Venous Thromboembolism) Risk  Recent " Flowsheet Documentation  Taken 8/27/2024 2315 by Ashlie Ingram RN  VTE Prevention/Management:   SCDs off (sequential compression devices)   patient refused intervention  Intervention: Prevent Infection  Recent Flowsheet Documentation  Taken 8/27/2024 2315 by Ashlie Ingram RN  Infection Prevention: hand hygiene promoted  Goal: Optimal Comfort and Wellbeing  Outcome: Progressing  Goal: Readiness for Transition of Care  Outcome: Progressing     Problem: Risk for Delirium  Goal: Optimal Coping  Outcome: Progressing  Goal: Improved Behavioral Control  Outcome: Progressing  Intervention: Minimize Safety Risk  Recent Flowsheet Documentation  Taken 8/27/2024 2315 by Ashlie Ingram, RN  Enhanced Safety Measures:   room near unit station   review medications for side effects with activity  Goal: Improved Attention and Thought Clarity  Outcome: Progressing  Goal: Improved Sleep  Outcome: Progressing

## 2024-08-28 NOTE — UTILIZATION REVIEW
"Inpatient to Observation note:    Admission Status; Secondary Review Determination     Under the authority of the Utilization Management Committee, the utilization review process indicated a secondary review on the above patient.  The review outcome is based on review of the medical records, discussions with staff, and applying clinical experience noted on the date of the review.          (x) Observation Status Appropriate - This patient does not meet hospital inpatient criteria and is placed in observation status. If this patient's primary payer is Medicare and was admitted as an inpatient, Condition Code 44 should be used and patient status changed to \"observation\".     RATIONALE FOR DETERMINATION     73 years old female with past medical history significant for DVT, PE, and hyperlipidemia admitted after patient underwent an elective robot-assisted paraesophageal hernia repair with mesh and partial fundoplication.  Postoperatively doing well and likely will be discharged later today.      The severity of illness, intensity of service provided, expected LOS and risk for adverse outcome make the care appropriate for further observation; however, doesn't meet criteria for hospital inpatient admission. Natalya Mckenzie PA-C notified of this determination.        The information on this document is developed by the utilization review team in order for the business office to ensure compliance.  This only denotes the appropriateness of proper admission status and does not reflect the quality of care rendered.         The definitions of Inpatient Status and Observation Status used in making the determination above are those provided in the CMS Coverage Manual, Chapter 1 and Chapter 6, section 70.4.      Sincerely,  Kirill John MD  Utilization Review  Physician Advisor  [facility].   "

## 2024-08-28 NOTE — DISCHARGE INSTRUCTIONS
Eating Habits     Dietary practices such as how much and how often you eat as well as the types of foods you eat will be very important.  The following information is a guide to help you recover through the postoperative time frame.  These are only guidelines and it is important to understand that everyone is different in their recovery.  It may be easy or somewhat difficult to advance your diet depending on the amount of inflammation present and how your body heals.  DO NOT GET FRUSTRATED.  Be patient.      There are two different dietary recommendations based on the type of anti-reflux surgery you have had (see below).  One is for a partial fundoplication and the other is for the Linx device.      The following are some guidelines to help minimize post-operative discomfort as well as help you recover after your surgical procedure.       1. Eat frequent, small meals, approximately 6-8 times a day      2. Avoid using straws, chewing gum or tobacco as this may cause you to swallow air which will lead to bloating and nausea     3. Walk frequently and eat sitting up.  Avoid lying down after eating as this may increase discomfort      4. Follow the recommended types of foods you can eat with each week following your surgery     5. In the first 2 weeks, you should avoid  sticky  foods such as crackers, breads,      6. Take small bits, chew your food well     7. You may drink fluids throughout the day in between meals as you normally would.           Foods to Avoid          In the recovery period, it is important to try to avoid these foods as they may cause discomfort, nausea and pain.  Eventually, once you have recovered completely, you should be able to resume a regular diet.                          Things To Consider After Surgery          How does the partial wrap affect my eating habits?     Once your stomach is partially wrapped around your esophagus, it can be difficult for normal sized food to move  through the narrowed portion of the esophagus where it joins the stomach.  This is normal and will take some time for the inflammation and swelling to go away.  During this time, eating like you did before surgery, will be difficult.           What sort of eating habits should I have after my surgery?     We recommend beginning with room temperature liquids or smoothies for the first 1-2 weeks.  Avoid carbonated drinks.  You may try thicker liquids or yogurt in small amounts.  After the first two weeks, you may begin to increase the size of the foods you eat to include scrambles eggs or soft noodles.           What types of foods or liquids may irritate me?     Try to avoid cold liquids as these may cause your esophagus to become irritate or spasm.  Avoid carbonated drinks, caffeine and alcohol for the firs several weeks after surgery          What other symptoms will I have after my surgery and for how long?     Immediately after surgery you may have shoulder pain.  This is from the gas used during and should resolve in 2-3 days.  Walking will help with absorption of the gas.  You may have some soreness in the abdominal muscles and back.  This is also normal and will eventually resolve.  Walking and moving around will help with this as well.

## 2024-08-29 ENCOUNTER — APPOINTMENT (OUTPATIENT)
Dept: CT IMAGING | Facility: CLINIC | Age: 74
DRG: 327 | End: 2024-08-29
Attending: HOSPITALIST
Payer: COMMERCIAL

## 2024-08-29 LAB — GLUCOSE BLDC GLUCOMTR-MCNC: 102 MG/DL (ref 70–99)

## 2024-08-29 PROCEDURE — 99024 POSTOP FOLLOW-UP VISIT: CPT | Performed by: PHYSICIAN ASSISTANT

## 2024-08-29 PROCEDURE — 120N000001 HC R&B MED SURG/OB

## 2024-08-29 PROCEDURE — G0378 HOSPITAL OBSERVATION PER HR: HCPCS

## 2024-08-29 PROCEDURE — 250N000011 HC RX IP 250 OP 636: Performed by: SURGERY

## 2024-08-29 PROCEDURE — 99222 1ST HOSP IP/OBS MODERATE 55: CPT | Mod: AI | Performed by: HOSPITALIST

## 2024-08-29 PROCEDURE — 250N000013 HC RX MED GY IP 250 OP 250 PS 637: Performed by: SURGERY

## 2024-08-29 PROCEDURE — 82962 GLUCOSE BLOOD TEST: CPT

## 2024-08-29 PROCEDURE — 250N000011 HC RX IP 250 OP 636: Performed by: HOSPITALIST

## 2024-08-29 PROCEDURE — 71275 CT ANGIOGRAPHY CHEST: CPT

## 2024-08-29 RX ORDER — SODIUM CHLORIDE FOR INHALATION 3 %
3 VIAL, NEBULIZER (ML) INHALATION
Status: DISCONTINUED | OUTPATIENT
Start: 2024-08-29 | End: 2024-08-30 | Stop reason: HOSPADM

## 2024-08-29 RX ORDER — IOPAMIDOL 755 MG/ML
75 INJECTION, SOLUTION INTRAVASCULAR ONCE
Status: COMPLETED | OUTPATIENT
Start: 2024-08-29 | End: 2024-08-29

## 2024-08-29 RX ORDER — FUROSEMIDE 10 MG/ML
20 INJECTION INTRAMUSCULAR; INTRAVENOUS ONCE
Status: COMPLETED | OUTPATIENT
Start: 2024-08-29 | End: 2024-08-29

## 2024-08-29 RX ADMIN — RIVAROXABAN 20 MG: 10 TABLET, FILM COATED ORAL at 18:16

## 2024-08-29 RX ADMIN — ACETAMINOPHEN 975 MG: 325 TABLET ORAL at 06:19

## 2024-08-29 RX ADMIN — SENNOSIDES AND DOCUSATE SODIUM 1 TABLET: 50; 8.6 TABLET ORAL at 20:44

## 2024-08-29 RX ADMIN — ACETAMINOPHEN 975 MG: 325 TABLET ORAL at 13:52

## 2024-08-29 RX ADMIN — FUROSEMIDE 20 MG: 10 INJECTION, SOLUTION INTRAMUSCULAR; INTRAVENOUS at 20:44

## 2024-08-29 RX ADMIN — ONDANSETRON 4 MG: 4 TABLET, ORALLY DISINTEGRATING ORAL at 20:44

## 2024-08-29 RX ADMIN — ACETAMINOPHEN 975 MG: 325 TABLET ORAL at 22:30

## 2024-08-29 RX ADMIN — SENNOSIDES AND DOCUSATE SODIUM 1 TABLET: 50; 8.6 TABLET ORAL at 08:20

## 2024-08-29 RX ADMIN — POLYETHYLENE GLYCOL 3350 17 G: 17 POWDER, FOR SOLUTION ORAL at 08:20

## 2024-08-29 RX ADMIN — IOPAMIDOL 75 ML: 755 INJECTION, SOLUTION INTRAVENOUS at 21:10

## 2024-08-29 RX ADMIN — GABAPENTIN 100 MG: 100 CAPSULE ORAL at 20:44

## 2024-08-29 ASSESSMENT — ACTIVITIES OF DAILY LIVING (ADL)
ADLS_ACUITY_SCORE: 27
ADLS_ACUITY_SCORE: 25
ADLS_ACUITY_SCORE: 27
ADLS_ACUITY_SCORE: 25
ADLS_ACUITY_SCORE: 27
ADLS_ACUITY_SCORE: 25
ADLS_ACUITY_SCORE: 25
ADLS_ACUITY_SCORE: 27
ADLS_ACUITY_SCORE: 25
ADLS_ACUITY_SCORE: 27
ADLS_ACUITY_SCORE: 27

## 2024-08-29 NOTE — PROGRESS NOTES
"PRIMARY DIAGNOSIS: \"GENERIC\" NURSING  OUTPATIENT/OBSERVATION GOALS TO BE MET BEFORE DISCHARGE:  ADLs back to baseline: No    Activity and level of assistance: Ambulating independently.    Pain status: Improved-controlled with oral pain medications.    Return to near baseline physical activity: No     Discharge Planner Nurse   Safe discharge environment identified: Yes  Barriers to discharge: Yes       Entered by: ANN STATON RN 08/28/2024 9:02 PM     A & O x4, up independently. Continues to require O2, will stay another night. Voiding, passing gas, no BM. Satting 88-90 while at rest on room air.     Ate small dinner. C/O headache, back of neck ache and minor abdominal pain. Tylenol given an hour early. All sites CDI. Lost IV access. Added 1L for sleep.   "

## 2024-08-29 NOTE — PROGRESS NOTES
"PRIMARY DIAGNOSIS: \"GENERIC\" NURSING  OUTPATIENT/OBSERVATION GOALS TO BE MET BEFORE DISCHARGE:  ADLs back to baseline: Yes    Activity and level of assistance: Up with standby assistance.    Pain status: Improved-controlled with oral pain medications.    Return to near baseline physical activity: No     Discharge Planner Nurse   Safe discharge environment identified: No  Barriers to discharge: Yes, Oxygen needs       Entered by: Irma Mar RN 08/29/2024 11:02 AM     Please review provider order for any additional goals.   Nurse to notify provider when observation goals have been met and patient is ready for discharge.  "

## 2024-08-29 NOTE — PROGRESS NOTES
PRIMARY DIAGNOSIS: S/P Repair of Paraesophageal Hernia  OUTPATIENT/OBSERVATION GOALS TO BE MET BEFORE DISCHARGE:  1. Stable vital signs Yes  2. Tolerating diet:Yes  3. Pain controlled with oral pain medications:  Yes  4. Positive bowel sounds:  Yes  5. Voiding without difficulty:  Yes  6. Able to ambulate:  Yes SBA  7. Provider specific discharge goals met:        Discharge Planner Nurse   Safe discharge environment identified: Yes  Barriers to discharge: Yes       Entered by: Marjorie Love RN 08/29/2024 6:50 AM     Pt remains on O2 1 LPM Denies SOB but appeared to be having dyspnea on exertion..

## 2024-08-29 NOTE — PROGRESS NOTES
PRIMARY DIAGNOSIS: S/P Repair of Paraesophageal Hernia  OUTPATIENT/OBSERVATION GOALS TO BE MET BEFORE DISCHARGE:  1. Stable vital signs Yes  2. Tolerating diet:Yes  3. Pain controlled with oral pain medications:   Denies pain  4. Positive bowel sounds:  Yes  5. Voiding without difficulty:  Yes  6. Able to ambulate:  Yes  7. Provider specific discharge goals met:      Discharge Planner Nurse   Safe discharge environment identified: Yes  Barriers to discharge: Yes       Entered by: Marjorie Love RN 08/29/2024 3:46 AM     Pt is alert and oriented x4. Denies pain. Dressings to 5 lap sites, CDI. Ambulates on SBA. Desating to 81-86 on ambulation but denies SOB. Continued w/ O2 @ 1lpm.

## 2024-08-29 NOTE — PLAN OF CARE
Pt A&Ox4 and VSS expect for hypertension. Pt still remains on 1L of oxygen. Surgical dressings clean, dry and intact. Refused ice on surgical dressings. Tolerating full liquid diet. I.S. performed on shift. O2 dipping down to 75% when walking around. Call light within reach and bed alarm on. Discharge pending oxygen at 90% on room air.    Problem: Adult Inpatient Plan of Care  Goal: Readiness for Transition of Care  Outcome: Progressing     Problem: Adult Inpatient Plan of Care  Goal: Optimal Comfort and Wellbeing  Outcome: Progressing   Goal Outcome Evaluation:

## 2024-08-29 NOTE — PROGRESS NOTES
General Surgery Progress Note:    Hospital Day # 1    ASSESSMENT:  1. S/P repair of paraesophageal hernia        Lori Liu is a 73 year old female who is s/p robot-assisted, laparoscopic paraesophageal hernia repair with mesh and partial fundoplication on 8/27/24. Patient doing well post-operatively. Pain adequately controlled. Tolerating a full liquid diet without nausea, vomiting. Patient remains continues to have shortness of breath and dyspnea with exertion and oxygen saturation drops to 80's with ambulation on room air.    PLAN:  - Full liquid diet. She was instructed to remain on a full liquid/pureed diet for the first week post-operatively. Fundoplication diet handout provided to patient  - PO pain control  - Wean O2 as able. Continue incentive spirometry and pulmonary hygiene  - Increase activity and encourage ambulation   - Likely discharge home this afternoon pending ambulation and off supplemental O2    SUBJECTIVE:   She is feeling okay this morning. Slept well overnight. Abdominal pain controlled. Tolerating a full liquid diet without nausea or vomiting. Continues to endorse shortness of breath and dyspnea with ambulation. Denies lightheadedness or dizziness. Voiding independently.      Patient Vitals for the past 24 hrs:   BP Temp Temp src Pulse Resp SpO2 Weight   08/29/24 0819 (!) 142/75 98  F (36.7  C) Oral 95 20 95 % --   08/29/24 0400 (!) 148/80 97.8  F (36.6  C) Oral 69 18 93 % --   08/29/24 0323 -- -- -- -- -- -- 103.7 kg (228 lb 9.6 oz)   08/29/24 0107 (!) 163/75 -- -- 66 -- 94 % --   08/28/24 2356 (!) 172/89 98.6  F (37  C) Oral 70 16 95 % --   08/28/24 2047 -- -- -- -- -- 94 % --   08/28/24 2046 -- -- -- -- -- (!) 89 % --   08/28/24 1536 (!) 141/69 98.4  F (36.9  C) Oral 64 20 91 % --   08/28/24 1418 -- -- -- -- -- (!) 82 % --         PHYSICAL EXAM:  General: patient seen resting in bed, no acute distress  Resp: no respiratory distress, breathing comfortably on 1L via nasal  canula  Abdomen: Soft, non-tender, non-distended. No rebound or guarding. Lap sites clean/dry/intact with steri strips in place.  Extremities: warm and well perfused    08/28 0700 - 08/29 0659  In: 480 [P.O.:480]  Out: -     Admission on 08/27/2024   Component Date Value    GLUCOSE BY METER POCT 08/27/2024 75     Creatinine 08/27/2024 0.74     GFR Estimate 08/27/2024 84     Hold Specimen 08/27/2024 [facility]     Platelet Count 08/28/2024 210     GLUCOSE BY METER POCT 08/28/2024 117 (H)     Hold Specimen 08/28/2024 [facility]     GLUCOSE BY METER POCT 08/29/2024 102 (H)         Natalya Mckenzie PA-C  [facility]  Surgery [address]  [place] [address]?  Office: [phone]

## 2024-08-29 NOTE — PROVIDER NOTIFICATION
"1545: Pat drops to 85 with 10 steps, 75 with hallway ambulation. .93 at rest on RA. Congested cough, poor IS performance, no improvement from yesterday (reaching 500 stacey). Paged Surgery team to assign hospitalist to her case.      Per Obs goals:  \"If new hypoxia less than 89% charted while off oxygen or on their current home oxygen support,  have physician consider change to inpatient status and document the severity and risk (Document reason for inpatient status as Acute hypoxemic respiratory failure)\"    Dr. Farr returned call, hospitalist consult placed.  "

## 2024-08-30 VITALS
HEIGHT: 63 IN | BODY MASS INDEX: 41.09 KG/M2 | HEART RATE: 72 BPM | OXYGEN SATURATION: 92 % | DIASTOLIC BLOOD PRESSURE: 60 MMHG | TEMPERATURE: 98 F | RESPIRATION RATE: 16 BRPM | SYSTOLIC BLOOD PRESSURE: 122 MMHG | WEIGHT: 231.92 LBS

## 2024-08-30 PROCEDURE — 250N000011 HC RX IP 250 OP 636: Performed by: INTERNAL MEDICINE

## 2024-08-30 PROCEDURE — 250N000013 HC RX MED GY IP 250 OP 250 PS 637: Performed by: SURGERY

## 2024-08-30 PROCEDURE — 99233 SBSQ HOSP IP/OBS HIGH 50: CPT | Performed by: INTERNAL MEDICINE

## 2024-08-30 PROCEDURE — 99024 POSTOP FOLLOW-UP VISIT: CPT | Performed by: SURGERY

## 2024-08-30 RX ORDER — FUROSEMIDE 10 MG/ML
40 INJECTION INTRAMUSCULAR; INTRAVENOUS ONCE
Status: COMPLETED | OUTPATIENT
Start: 2024-08-30 | End: 2024-08-30

## 2024-08-30 RX ORDER — FUROSEMIDE 10 MG/ML
40 INJECTION INTRAMUSCULAR; INTRAVENOUS ONCE
Status: DISCONTINUED | OUTPATIENT
Start: 2024-08-30 | End: 2024-08-30

## 2024-08-30 RX ADMIN — ACETAMINOPHEN 975 MG: 325 TABLET ORAL at 13:56

## 2024-08-30 RX ADMIN — SENNOSIDES AND DOCUSATE SODIUM 1 TABLET: 50; 8.6 TABLET ORAL at 09:39

## 2024-08-30 RX ADMIN — FUROSEMIDE 40 MG: 10 INJECTION, SOLUTION INTRAVENOUS at 09:39

## 2024-08-30 RX ADMIN — POLYETHYLENE GLYCOL 3350 17 G: 17 POWDER, FOR SOLUTION ORAL at 09:40

## 2024-08-30 RX ADMIN — ACETAMINOPHEN 975 MG: 325 TABLET ORAL at 06:54

## 2024-08-30 ASSESSMENT — ACTIVITIES OF DAILY LIVING (ADL)
ADLS_ACUITY_SCORE: 27

## 2024-08-30 NOTE — PROGRESS NOTES
"Lori Liu is doing well, CT completed last night, recovering faster after ambulation than prior to surgery, tolerating full liquids        Vitals:    08/30/24 0013 08/30/24 0402 08/30/24 0631 08/30/24 0745   BP: 134/83 (!) 145/78  122/60   BP Location: Left arm Left arm  Left arm   Patient Position:       Cuff Size:       Pulse: 75 76  72   Resp: 16 16  16   Temp: 99.1  F (37.3  C) 98.9  F (37.2  C)  98  F (36.7  C)   TempSrc: Oral Oral  Oral   SpO2: 97% 95%  95%   Weight:   105.2 kg (231 lb 14.8 oz)    Height:           PHYSICAL EXAM:  GEN: No acute distress, comfortable  ABD:soft  EXT:No cyanosis, edema or obvious abnormalities        Recent Labs   Lab 08/28/24  0841          No results for input(s): \"NA\", \"CO2\", \"BUN\", \"CREATININE\", \"ALBUMIN\", \"BILITOT\", \"ALKPHOS\", \"ALT\", \"AST\", \"GLUCOSE\" in the last 168 hours.    Invalid input(s): \"K\", \"CL\", \"CALCIUM\", \"LABALBU\", \"PROT\"      A/P:  Lori Liu is s/p r- PEH repair  CT results noted, no change in treatment given the fact she is on antiplatelet therapy.  Respiratory status slightly improved when compared to pre-surgical intervention.   -Patient would like to go home which is reasonable from a surgical standpoint as it is not likely her chronic pulmonary disease will improve at this time. She will continue with ambulation and her spirometry.   -will plan on discharge today    Lisandro Farr, DO [facility]  [phone]  [facility] Department of Surgery  "

## 2024-08-30 NOTE — PROGRESS NOTES
PRIMARY DIAGNOSIS: ACUTE PAIN  OUTPATIENT/OBSERVATION GOALS TO BE MET BEFORE DISCHARGE:  1. Pain Status: Improved-controlled with oral pain medications.    2. Return to near baseline physical activity: No    3. Cleared for discharge by consultants (if involved): No    Discharge Planner Nurse   Safe discharge environment identified: Yes  Barriers to discharge: Yes       Entered by: ANN STATON RN 08/29/2024 9:42 PM     Patient is A & O x4, up with SBA. Dyspnea. New IV placed, IV lasix given. Ate small dinner. On 1L. Hospitalist consulted. CT done. Temp 99.5, other VSS. No c/o pain. Alarms on for safety.

## 2024-08-30 NOTE — PLAN OF CARE
Pt ready for discharge. All belongings sent with patient. IV removed. Discharge instructions reviewed with pt and all questions answered. Spouse to transport.

## 2024-08-30 NOTE — PROGRESS NOTES
[facility]    Medicine Progress Note - Hospitalist Service    Date of Admission:  8/27/2024    Assessment & Plan   Lori Liu is a 73 year old old female who  has a past medical history of Anemia, iron deficiency, Antiplatelet or antithrombotic long-term use, Gastroesophageal reflux disease, Gastroesophageal reflux disease with esophagitis without hemorrhage, Mixed hyperlipidemia, SHASHI (obstructive sleep apnea), Osteoarthritis of both knees, Pulmonary embolism (H), and Thrombosis.  She underwent who is s/p robot-assisted, laparoscopic paraesophageal hernia repair with mesh and partial fundoplication on 8/27/24 with Dr. Farr.  Postoperatively patient has some shortness of breath with pulmonary edema and was diuresed with Lasix     Dyspnea much improved  Fluid overload:   Has Hx of PE on lifelong AC with DOAC which was held pre-op and resumed on 8/28/24 . She also has pulmonary fibrosis, est with pulm. Discussed possibility of new PE being low. Pt would be re-assured if no new PE. Discussed with surgeon who advised that increased LFTs and CO2 in thorax (possibly appearing as small pneumo on imaging) is expected and would not require liver wkup or chest tube.  Lasix 20mg IV on time dose given on 8/29  CTchest done showed     Tiny subsegmental pulmonary embolism versus artifact within the medial left lower lobe. This is age indeterminate, though new from 10/19/2021.   2.  Significant edema and fluid encompassing the distal thoracic esophagus, which is possibly in continuity with the pleural cavities. Integrity of the underlying esophagus is difficult to assess on this early phase of contrast. Surgical input   recommended.   3. Small bilateral pleural effusions.   4.  Increasing bilateral interstitial and groundglass opacities, which may reflect superimposed edema, infectious/inflammatory infiltrate, and/or progression of pulmonary fibrosis.   5.  Pulmonary fibrosis, with a probable UIP  "pattern of disease.   6.  Cardiomegaly and asymmetric right heart enlargement, likely related to chronic pulmonary hypertension.     Pt already on anticoagulation for PE   Lasix 40mg one time dose given on 8/30   SOB improved. Able to be weaned off of oxygen      Ascending aortic dilation: 4cm on January echo  -Avoid hypertensive urgency  -consider outpatient B-blockade/ACE, statin     Body mass index is 40.49 kg/m .  Status-post Procedure(s):  paraesophageal hernia repair with mesh and partial fundoplication, ROBOT-ASSISTED, LAPAROSCOPIC performed on 08/28/24  Complications: none  EBL: 15cc        Hemoglobin   Date Value Ref Range Status   04/01/2024 13.9 11.7 - 15.7 g/dL Final            Creatinine   Date Value Ref Range Status   08/27/2024 0.74 0.51 - 0.95 mg/dL Final      PPx: defer to surgery, on Xarelto   Medically Ready for Discharge: Anticipated Tomorrow             Diet: Full Liquid Diet (NO CARBONATION)  Diet  Diet    DVT Prophylaxis: DOAC  Sierra Catheter: Not present  Lines: None     Cardiac Monitoring: None  Code Status: Full Code      Clinically Significant Risk Factors                            # Severe Obesity: Estimated body mass index is 41.08 kg/m  as calculated from the following:    Height as of this encounter: 1.6 m (5' 3\").    Weight as of this encounter: 105.2 kg (231 lb 14.8 oz)., PRESENT ON ADMISSION                  Disposition Plan     Medically Ready for Discharge: Anticipated Today    Discussed with Dr. Farr with surgery team they are planning on sending her home on 8/30/2024         Rebecca Omer MD  Hospitalist Service  [facility]  Securely message with [employer] (more info)  Text page via [employer] Paging/Directory   ______________________________________________________________________    Interval History   Patient is resting comfortably in bed.  Denies any shortness of breath.  Was able to get up and ambulate.  No other acute issues    Physical Exam   Vital " Signs: Temp: 98  F (36.7  C) Temp src: Oral BP: 122/60 Pulse: 72   Resp: 16 SpO2: 93 % O2 Device: Nasal cannula Oxygen Delivery: 1 LPM  Weight: 231 lbs 14.78 oz    General Appearance: Alert awake, not in acute distress  Respiratory: Clear to auscultation bilaterally  Cardiovascular: Normal rate rhythm regular  GI: Soft, nontender nondistended bowel sounds positive  Skin: No rashes or lesions  Other: No lower extremity edema    Medical Decision Making       49 MINUTES SPENT BY ME on the date of service doing chart review, history, exam, documentation & further activities per the note.      Data         Imaging results reviewed over the past 24 hrs:   Recent Results (from the past 24 hour(s))   CT Chest Pulmonary Embolism w Contrast    Narrative    EXAM: CT CHEST PULMONARY EMBOLISM W CONTRAST  LOCATION: [facility]  DATE: 8/29/2024    INDICATION: Hiatal hernia repair on 8/27/2024; previous pulmonary embolism, with anticoagulation healed prior surgery; shortness of breath.  COMPARISON: 6/17/2024 and 10/19/2021.  TECHNIQUE: CT chest pulmonary angiogram during arterial phase injection of IV contrast. Multiplanar reformats and MIP reconstructions were performed. Dose reduction techniques were used.   CONTRAST: 75 mL Isovue 370.    FINDINGS:    ANGIOGRAM CHEST: Tiny subsegmental pulmonary embolism versus artifact within the medial left lower lobe, series 7 image 129. This is age indeterminate, though new from 10/19/2021. Mid pulmonary artery measures up to 4.2 cm, previously 3.9 cm, and is   indicative of underlying pulmonary hypertension.    Mild to moderate right heart strain, not significantly changed from prior and likely related to the underlying pulmonary hypertension.    Thoracic aorta is normal in course and caliber. Mild atheromatous disease.    LUNGS AND PLEURA: Trachea and large airways are patent. Diffuse pulmonary fibrosis, with associated areas of subpleural honeycombing, suggesting a  probable UIP pattern of disease. There are increasing bilateral interstitial and groundglass opacities,   which may reflect superimposed infectious/inflammatory infiltrate. No large focal airspace consolidation.     No suspicious pulmonary nodule.    No pneumothorax.     Small bilateral pleural effusions.     MEDIASTINUM/AXILLAE: No mediastinal/hilar adenopathy.     There is significant edema and fluid encompassing the distal thoracic esophagus, which is possibly in continuity with the pleural cavities. Integrity of the underlying esophagus is difficult to assess on this early phase of contrast. Small amount of gas   is located in the inferior periesophageal mediastinum, compatible with recent postsurgical change. Gas within the bilateral chest walls is likely also attributable to recent postsurgical change.      No axillary lymphadenopathy.    Chest wall is unremarkable.    Cardiomegaly and asymmetric right heart enlargement. No pericardial effusion.     CORONARY ARTERY CALCIFICATION: None.    UPPER ABDOMEN: Cholecystectomy.    MUSCULOSKELETAL: No suspicious abnormality.    OTHER: No additionally suspicious abnormality.      Impression    IMPRESSION:  1.  Tiny subsegmental pulmonary embolism versus artifact within the medial left lower lobe. This is age indeterminate, though new from 10/19/2021.  2.  Significant edema and fluid encompassing the distal thoracic esophagus, which is possibly in continuity with the pleural cavities. Integrity of the underlying esophagus is difficult to assess on this early phase of contrast. Surgical input   recommended.  3.  Small bilateral pleural effusions.  4.  Increasing bilateral interstitial and groundglass opacities, which may reflect superimposed edema, infectious/inflammatory infiltrate, and/or progression of pulmonary fibrosis.  5.  Pulmonary fibrosis, with a probable UIP pattern of disease.  6.  Cardiomegaly and asymmetric right heart enlargement, likely related to chronic  pulmonary hypertension.    Critical Result: VTE (PE/DVT)    Finding was identified on 8/29/2024 9:45 PM CDT.    Dr. Dill was contacted by me on 8/29/2024 10:00 PM CDT and verbalized understanding of the critical result.

## 2024-08-30 NOTE — PLAN OF CARE
"PRIMARY DIAGNOSIS: \"GENERIC\" NURSING  OUTPATIENT/OBSERVATION GOALS TO BE MET BEFORE DISCHARGE:  ADLs back to baseline: Yes    Activity and level of assistance: Up with standby assistance.    Pain status: Improved-controlled with oral pain medications.    Return to near baseline physical activity: Yes     Discharge Planner Nurse   Safe discharge environment identified: Yes  Barriers to discharge: Yes       Entered by: Leeanna Nunez RN 08/30/2024 7:50 AM     On 2L O2 during the night. Complaints of 1/10 shoulder pain this morning, scheduled Tylenol given. Slept well overnight.   "

## 2024-08-30 NOTE — PROVIDER NOTIFICATION
Hospitalist MD paged: Are you ok with pt discharging this afternoon? O2 sats are 89-92% on room air. Just want to make sure from your standpoint, surgery put discharge order in already.    Ok to discharge per MD.

## 2024-08-30 NOTE — UTILIZATION REVIEW
Admission Status; Secondary Review Determination   Under the authority of the Utilization Management Committee, the utilization review process indicated a secondary review on Lori Liu. The review outcome is based on review of the medical records, discussions with staff, and applying clinical experience noted on the date of the review.   (x) Inpatient Status Appropriate - This patient's medical care is consistent with medical management for inpatient care and reasonable inpatient medical practice.     RATIONALE FOR DETERMINATION   Lori Liu is a 73 yr old female with hx anemia, GERD, SHASHI,PE, pulmonary fibrosis who presented 8/27/24 for paraesophageal hernia repair with mesh and partial fundoplication.  Unfortunately her postop course has been complicated with new hypoxia and unable to wean from oxygen on POD#1.  Oxygen saturations that day 85% on RA.  POD#2 was still 88-90% at rest and mid 80s with ambulation.  Hospitalist consultation obtained as ongoing hypoxia.  Lasix given, CT PE run performed to r/o PE negative but still requiring 1L NC as dropped to 88% on 1/2L NC.  Surgery hopeful today will allow increase in ambulation and lung expansion to allow wean from oxygen for discharge but currently now POD#3---well beyond LOS expected for an outpatient procedure.    At the time of admission with the information available to the attending physician more than 2 nights Hospital complex care was anticipated, based on patient risk of adverse outcome if treated as outpatient and complex care required. Inpatient admission is appropriate based on the Medicare guidelines.   The information on this document is developed by the utilization review team in order for the business office to ensure compliance. This only denotes the appropriateness of proper admission status and does not reflect the quality of care rendered.   The definitions of Inpatient Status and Observation Status used in making the  determination above are those provided in the CMS Coverage Manual, Chapter 1 and Chapter 6, section 70.4.   Sincerely,   Paula Barrientos MD  Utilization Review  Physician Advisor  [facility]

## 2024-08-30 NOTE — CONSULTS
MEDICINE CONSULT    Physician requesting consult: Dr. Farr  Reason for consult: Dyspnea, hypoxia     Assessment and Plan:    Lori Liu is a 73 year old old female who  has a past medical history of Anemia, iron deficiency, Antiplatelet or antithrombotic long-term use, Gastroesophageal reflux disease, Gastroesophageal reflux disease with esophagitis without hemorrhage, Mixed hyperlipidemia, SHASHI (obstructive sleep apnea), Osteoarthritis of both knees, Pulmonary embolism (H), and Thrombosis.     Dyspnea: Has Hx of PE on lifelong AC with DOAC which was held pre-op and resumed yesterday. She also has pulmonary fibrosis, est with pulm. Discussed possibility of new PE being low. Pt would be re-assured if no new PE. Discussed with surgeon who advised that increased LFTs and CO2 in thorax (possibly appearing as small pneumo on imaging) is expected and would not require liver wkup or chest tube.  -CT PE  -IV lasix 20mg x1    Ascending aortic dilation: 4cm on January echo  -Avoid hypertensive urgency  -consider outpatient B-blockade/ACE, statin    Body mass index is 40.49 kg/m .  Status-post Procedure(s):  paraesophageal hernia repair with mesh and partial fundoplication, ROBOT-ASSISTED, LAPAROSCOPIC performed on 08/28/24  Complications: none  EBL: 15cc  Hemoglobin   Date Value Ref Range Status   04/01/2024 13.9 11.7 - 15.7 g/dL Final     Creatinine   Date Value Ref Range Status   08/27/2024 0.74 0.51 - 0.95 mg/dL Final     PPx: defer to surgery, will comment if medicine input requested  Medically Ready for Discharge: Anticipated Tomorrow       History:    Lori Liu is a 73 year old old female who is s/p robot-assisted, laparoscopic paraesophageal hernia repair with mesh and partial fundoplication on 8/27/24. She has had difficulty weaning off O2. She hasn't ambulated much, but feels more PALMER that pre-op. She has no other focal complaints.    Denies history of MI, CVA, or Cancer  Denies heavy alcohol  "use  former tobacco use    Surgical History  She  has a past surgical history that includes Laparoscopic nissen fundoplication (N/A, 8/27/2024).     Past Surgical History:   Procedure Laterality Date    LAPAROSCOPIC NISSEN FUNDOPLICATION N/A 8/27/2024    Procedure: paraesophageal hernia repair with mesh and partial fundoplication, ROBOT-ASSISTED, LAPAROSCOPIC;  Surgeon: Lisandro Farr DO;  Location: [facility] Main OR     Allergies  Allergies   Allergen Reactions    Cephalexin      Other reaction(s): Itching, itching, rash    Penicillins Hives and Itching     Social History  Reviewed, and she  reports that she has quit smoking. Her smoking use included cigarettes. She has never used smokeless tobacco. She reports that she does not drink alcohol and does not use drugs.  Social History     Tobacco Use    Smoking status: Former     Types: Cigarettes    Smokeless tobacco: Never   Substance Use Topics    Alcohol use: Never     Family History  Reviewed, and family history includes Clotting Disorder in her father; Colon Cancer in her mother; Lymphoma in her sister.    Review of Systems  All pertinent positives and negatives reviewed in History.  All other 12 point review of systems reviewed and negative.      Objective:    Physical Exam  Appears nad in the bed  Mild dyspnea on RA  B/l fine crackles though greater at left lung base  RRR without murmur  Dressed abdominal incision  Awake, alert, oriented  Cardiographics  Echo: Jan '24 - normal EF, normal RV size/function, 4cm ascending aortic dilation  Imaging  POC US Guidance Needle Placement  Ultrasound was performed as guidance to an anesthesia procedure.  Click   \"PACS images\" hyperlink below to view any stored images.  For specific   procedure details, view procedure note authored by anesthesia.     Lab Review   No visits with results within 2 Month(s) from this visit.   Latest known visit with results is:   Lab Requisition on 03/01/2024   Component Date Value    " Cholesterol 03/01/2024 102     Triglycerides 03/01/2024 75     Direct Measure HDL 03/01/2024 48 (L)     LDL Cholesterol Calculat* 03/01/2024 39     Non HDL Cholesterol 03/01/2024 54     Patient Fasting > 8hrs? 03/01/2024 Unknown      Appreciate the opportunity to participate in the care of Lori Liu, please feel free to contact for any questions or concerns     Ba Dill MD, MPH  [facility]  Office # [phone]

## 2024-09-01 NOTE — DISCHARGE SUMMARY
"Physician Discharge Summary    Primary Care Physician:  Sally Coulter (Inactive)    Discharge Provider: Randolph Farr D.O., FACS   Admission Date: 8/27/2024  Discharge Date: 8/30/2024     Primary Diagnosis at Discharge:   Patient Active Problem List   Diagnosis    Pulmonary emboli (H)    Hypoxia    Exertional dyspnea    Dyspnea    Chest heaviness    Hiatal hernia    Abnormal cardiovascular stress test    Interstitial lung disease (H)    S/P repair of paraesophageal hernia      Disposition: Home or Self Care  Condition at Discharge: Stable     Physical Exam:   /60 (BP Location: Left arm)   Pulse 72   Temp 98  F (36.7  C) (Oral)   Resp 16   Ht 1.6 m (5' 3\")   Wt 105.2 kg (231 lb 14.8 oz)   SpO2 92%   BMI 41.08 kg/m     CTA  RRR  Ab Soft  Dressings/wounds are Clean and Dry      Surgery: Robotic paraesophageal hernia repair      Discharge Medications:      Medication List        Started      acetaminophen 325 MG tablet  Commonly known as: TYLENOL  650-975 mg, Oral, EVERY 6 HOURS PRN     senna-docusate 8.6-50 MG tablet  Commonly known as: SENOKOT-S/PERICOLACE  1 tablet, Oral, 2 TIMES DAILY     traMADol 50 MG tablet  Commonly known as: ULTRAM  50 mg, Oral, EVERY 6 HOURS PRN              Discharge Instructions:  Follow up appointment with Primary Care Physician: Sally Coulter (Inactive)  Follow up appointment with Dr. Farr in: 2 weeks  Follow up test / procedure to do as outpatient:   Diet: Regular  Activity: Ad Ella  Restrictions: No lifting over 20 pounds for total of 6 weeks, dietary restrictions as discussed  Wound / drain care: None  The following tests have been done with results pending: None     Hospital Summary:   Lori Liu underwent an uncomplicated robotic paraesophageal hernia repair.  she recovered on the Med/Surg floor, diet was advanced and her pain was controlled with oral medications.  On POD # 2 her oxygen saturations did not stabilize.  Hospitalist services were consulted and a CT " of her chest was obtained.  Small subsegmental pulm emboli were noted despite being on her anticoagulation.  However, she continued to improve from respiratory standpoint and did not require further oxygen or pulmonary therapy.  On postoperative day #3 she was discharged home without any complications.          Randolph Farr D.O. [facility]  [phone]

## 2024-09-09 ENCOUNTER — TELEPHONE (OUTPATIENT)
Dept: SURGERY | Facility: CLINIC | Age: 74
End: 2024-09-09
Payer: COMMERCIAL

## 2024-09-09 NOTE — TELEPHONE ENCOUNTER
Patient is s/p robot-assisted laparoscopic paraesophageal hernia repair with mesh and partial fundoplication on 8/27/2024.    Patient calling regarding advancing her diet as she has been tolerating full liquid diet since surgery. She states she had mashed potatoes on Saturday and scrambled eggs on Sunday. She ate those with no complications. RN reviewed fundoplication diet and provided further examples of what she can eat at this time. Reviewed foods to avoid and symptoms to watch for. Patient verbalized good understanding. No further questions or concerns at this time.    Luiza ESCOBEDO RN, BSN    [facility]  General Surgery  [facility] [facility] [address]  [address]  [place] [address]  Office: [phone]  Employed by [facility]

## 2024-09-18 ENCOUNTER — OFFICE VISIT (OUTPATIENT)
Dept: SURGERY | Facility: CLINIC | Age: 74
End: 2024-09-18
Attending: SURGERY
Payer: COMMERCIAL

## 2024-09-18 DIAGNOSIS — Z98.890 POST-OPERATIVE STATE: Primary | ICD-10-CM

## 2024-09-18 PROCEDURE — 99024 POSTOP FOLLOW-UP VISIT: CPT | Performed by: SURGERY

## 2024-09-18 NOTE — LETTER
9/18/2024      Lori Liu  [address]  [place] [facility] [place] [address]      Dear Colleague,    Thank you for referring your patient, Lori Liu, to the [facility] [place]. Please see a copy of my visit note below.         HPI: Lori Liu is here for follow up after her hiatal hernia repair and partial fundoplication.  She is eating scrambled eggs and pasta.  She is walking and states her breathing has improved.      Allergies, Medications, Social History, Past Medical History and Past Surgical History were reviewed and are noted in the chart.    There were no vitals taken for this visit.  There is no height or weight on file to calculate BMI.      EXAM:   GENERAL: Appears well  Abdomen- soft, well healed port site incisions    Incision/Surgical Site 08/27/24 Midline Abdomen (Active)       Incision/Surgical Site 08/27/24 Upper;Midline Abdomen (Active)       Incision/Surgical Site 08/27/24 Left Abdomen (Active)       Incision/Surgical Site 08/27/24 Left;Distal Abdomen (Active)       Incision/Surgical Site 08/27/24 Right Abdomen (Active)       Assessment/Plan: Lori Liu continues to do well. Her wound is healing and overall progressing well.  She will continue to advance her diet as tolerated and ambulate normally.  She will hold off on heavy lifting or strenuous activities for a total of six weeks from surgery.  She may now follow up on a prn basis.         Randolph Farr DO [facility]  [phone]      Again, thank you for allowing me to participate in the care of your patient.        Sincerely,        Lisandro Farr DO

## 2024-09-18 NOTE — PROGRESS NOTES
HPI: Lori Liu is here for follow up after her hiatal hernia repair and partial fundoplication.  She is eating scrambled eggs and pasta.  She is walking and states her breathing has improved.      Allergies, Medications, Social History, Past Medical History and Past Surgical History were reviewed and are noted in the chart.    There were no vitals taken for this visit.  There is no height or weight on file to calculate BMI.      EXAM:   GENERAL: Appears well  Abdomen- soft, well healed port site incisions    Incision/Surgical Site 08/27/24 Midline Abdomen (Active)       Incision/Surgical Site 08/27/24 Upper;Midline Abdomen (Active)       Incision/Surgical Site 08/27/24 Left Abdomen (Active)       Incision/Surgical Site 08/27/24 Left;Distal Abdomen (Active)       Incision/Surgical Site 08/27/24 Right Abdomen (Active)       Assessment/Plan: Lori Liu continues to do well. Her wound is healing and overall progressing well.  She will continue to advance her diet as tolerated and ambulate normally.  She will hold off on heavy lifting or strenuous activities for a total of six weeks from surgery.  She may now follow up on a prn basis.         Randolph Farr DO [facility]  [phone]

## 2024-11-20 ENCOUNTER — PATIENT OUTREACH (OUTPATIENT)
Dept: CARE COORDINATION | Facility: CLINIC | Age: 74
End: 2024-11-20
Payer: COMMERCIAL

## 2024-12-02 ENCOUNTER — HOSPITAL ENCOUNTER (OUTPATIENT)
Dept: RADIOLOGY | Facility: CLINIC | Age: 74
Discharge: HOME | End: 2024-12-02
Attending: PHYSICIAN ASSISTANT | Admitting: PHYSICIAN ASSISTANT
Payer: COMMERCIAL

## 2024-12-02 DIAGNOSIS — K44.9 DIAPHRAGMATIC HERNIA WITHOUT OBSTRUCTION OR GANGRENE: ICD-10-CM

## 2024-12-02 PROCEDURE — 71046 X-RAY EXAM CHEST 2 VIEWS: CPT

## 2024-12-08 ENCOUNTER — HEALTH MAINTENANCE LETTER (OUTPATIENT)
Age: 74
End: 2024-12-08

## 2024-12-14 ENCOUNTER — MEDICAL CORRESPONDENCE (OUTPATIENT)
Dept: SCHEDULING | Facility: CLINIC | Age: 74
End: 2024-12-14
Payer: COMMERCIAL

## 2025-01-15 ENCOUNTER — APPOINTMENT (OUTPATIENT)
Dept: CT IMAGING | Facility: CLINIC | Age: 75
End: 2025-01-15
Payer: COMMERCIAL

## 2025-01-15 ENCOUNTER — HOSPITAL ENCOUNTER (EMERGENCY)
Facility: CLINIC | Age: 75
Discharge: HOME | End: 2025-01-15
Payer: COMMERCIAL

## 2025-01-15 VITALS
HEIGHT: 65 IN | WEIGHT: 220 LBS | DIASTOLIC BLOOD PRESSURE: 70 MMHG | HEART RATE: 76 BPM | TEMPERATURE: 98.4 F | SYSTOLIC BLOOD PRESSURE: 150 MMHG | RESPIRATION RATE: 50 BRPM | BODY MASS INDEX: 36.65 KG/M2 | OXYGEN SATURATION: 96 %

## 2025-01-15 DIAGNOSIS — R06.02 SHORTNESS OF BREATH: ICD-10-CM

## 2025-01-15 DIAGNOSIS — R07.9 CHEST PAIN, UNSPECIFIED TYPE: ICD-10-CM

## 2025-01-15 DIAGNOSIS — J96.01 ACUTE RESPIRATORY FAILURE WITH HYPOXIA (H): ICD-10-CM

## 2025-01-15 LAB
ANION GAP SERPL CALCULATED.3IONS-SCNC: 13 MMOL/L (ref 7–15)
ATRIAL RATE - MUSE: 74 BPM
BASOPHILS # BLD AUTO: 0 10E3/UL (ref 0–0.2)
BASOPHILS NFR BLD AUTO: 0 %
BUN SERPL-MCNC: 19.2 MG/DL (ref 8–23)
CALCIUM SERPL-MCNC: 9 MG/DL (ref 8.8–10.4)
CHLORIDE SERPL-SCNC: 102 MMOL/L (ref 98–107)
CREAT SERPL-MCNC: 0.79 MG/DL (ref 0.51–0.95)
DIASTOLIC BLOOD PRESSURE - MUSE: NORMAL MMHG
EGFRCR SERPLBLD CKD-EPI 2021: 78 ML/MIN/1.73M2
EOSINOPHIL # BLD AUTO: 0.1 10E3/UL (ref 0–0.7)
EOSINOPHIL NFR BLD AUTO: 1 %
ERYTHROCYTE [DISTWIDTH] IN BLOOD BY AUTOMATED COUNT: 13.9 % (ref 10–15)
FLUAV RNA SPEC QL NAA+PROBE: NEGATIVE
FLUBV RNA RESP QL NAA+PROBE: NEGATIVE
GLUCOSE SERPL-MCNC: 112 MG/DL (ref 70–99)
HCO3 SERPL-SCNC: 22 MMOL/L (ref 22–29)
HCT VFR BLD AUTO: 41.4 % (ref 35–47)
HGB BLD-MCNC: 13.7 G/DL (ref 11.7–15.7)
HOLD SPECIMEN: NORMAL
IMM GRANULOCYTES # BLD: 0 10E3/UL
IMM GRANULOCYTES NFR BLD: 1 %
INTERPRETATION ECG - MUSE: NORMAL
LYMPHOCYTES # BLD AUTO: 0.7 10E3/UL (ref 0.8–5.3)
LYMPHOCYTES NFR BLD AUTO: 10 %
MCH RBC QN AUTO: 33 PG (ref 26.5–33)
MCHC RBC AUTO-ENTMCNC: 33.1 G/DL (ref 31.5–36.5)
MCV RBC AUTO: 100 FL (ref 78–100)
MONOCYTES # BLD AUTO: 0.4 10E3/UL (ref 0–1.3)
MONOCYTES NFR BLD AUTO: 6 %
NEUTROPHILS # BLD AUTO: 5.8 10E3/UL (ref 1.6–8.3)
NEUTROPHILS NFR BLD AUTO: 82 %
NRBC # BLD AUTO: 0 10E3/UL
NRBC BLD AUTO-RTO: 0 /100
NT-PROBNP SERPL-MCNC: 240 PG/ML (ref 0–900)
P AXIS - MUSE: 32 DEGREES
PLATELET # BLD AUTO: 258 10E3/UL (ref 150–450)
POTASSIUM SERPL-SCNC: 4.2 MMOL/L (ref 3.4–5.3)
PR INTERVAL - MUSE: 158 MS
QRS DURATION - MUSE: 84 MS
QT - MUSE: 416 MS
QTC - MUSE: 461 MS
R AXIS - MUSE: -53 DEGREES
RBC # BLD AUTO: 4.15 10E6/UL (ref 3.8–5.2)
RSV RNA SPEC NAA+PROBE: NEGATIVE
SARS-COV-2 RNA RESP QL NAA+PROBE: NEGATIVE
SODIUM SERPL-SCNC: 137 MMOL/L (ref 135–145)
SYSTOLIC BLOOD PRESSURE - MUSE: NORMAL MMHG
T AXIS - MUSE: 51 DEGREES
TROPONIN T SERPL HS-MCNC: 10 NG/L
TROPONIN T SERPL HS-MCNC: 11 NG/L
VENTRICULAR RATE- MUSE: 74 BPM
WBC # BLD AUTO: 7 10E3/UL (ref 4–11)

## 2025-01-15 PROCEDURE — 36415 COLL VENOUS BLD VENIPUNCTURE: CPT

## 2025-01-15 PROCEDURE — 250N000011 HC RX IP 250 OP 636

## 2025-01-15 PROCEDURE — 83880 ASSAY OF NATRIURETIC PEPTIDE: CPT

## 2025-01-15 PROCEDURE — 80048 BASIC METABOLIC PNL TOTAL CA: CPT

## 2025-01-15 PROCEDURE — 84484 ASSAY OF TROPONIN QUANT: CPT

## 2025-01-15 PROCEDURE — 99285 EMERGENCY DEPT VISIT HI MDM: CPT | Mod: 25

## 2025-01-15 PROCEDURE — 85041 AUTOMATED RBC COUNT: CPT

## 2025-01-15 PROCEDURE — 71275 CT ANGIOGRAPHY CHEST: CPT

## 2025-01-15 PROCEDURE — 94640 AIRWAY INHALATION TREATMENT: CPT

## 2025-01-15 PROCEDURE — 36415 COLL VENOUS BLD VENIPUNCTURE: CPT | Performed by: EMERGENCY MEDICINE

## 2025-01-15 PROCEDURE — 70450 CT HEAD/BRAIN W/O DYE: CPT

## 2025-01-15 PROCEDURE — 96374 THER/PROPH/DIAG INJ IV PUSH: CPT | Mod: 59

## 2025-01-15 PROCEDURE — 82565 ASSAY OF CREATININE: CPT

## 2025-01-15 PROCEDURE — 93005 ELECTROCARDIOGRAM TRACING: CPT

## 2025-01-15 PROCEDURE — 87637 SARSCOV2&INF A&B&RSV AMP PRB: CPT

## 2025-01-15 PROCEDURE — 999N000157 HC STATISTIC RCP TIME EA 10 MIN

## 2025-01-15 PROCEDURE — 72125 CT NECK SPINE W/O DYE: CPT

## 2025-01-15 PROCEDURE — 250N000009 HC RX 250

## 2025-01-15 PROCEDURE — 85004 AUTOMATED DIFF WBC COUNT: CPT

## 2025-01-15 RX ORDER — CARBOXYMETHYLCELLULOSE SODIUM 5 MG/ML
1 SOLUTION/ DROPS OPHTHALMIC 3 TIMES DAILY PRN
COMMUNITY

## 2025-01-15 RX ORDER — ALBUTEROL SULFATE 90 UG/1
2 INHALANT RESPIRATORY (INHALATION) EVERY 4 HOURS PRN
Qty: 18 G | Refills: 0 | Status: SHIPPED | OUTPATIENT
Start: 2025-01-15

## 2025-01-15 RX ORDER — METHYLPREDNISOLONE 4 MG/1
TABLET ORAL
Qty: 21 TABLET | Refills: 0 | Status: SHIPPED | OUTPATIENT
Start: 2025-01-15 | End: 2025-01-20

## 2025-01-15 RX ORDER — INHALER, ASSIST DEVICES
SPACER (EA) MISCELLANEOUS
Qty: 1 EACH | Refills: 0 | Status: SHIPPED | OUTPATIENT
Start: 2025-01-15

## 2025-01-15 RX ORDER — IPRATROPIUM BROMIDE AND ALBUTEROL SULFATE 2.5; .5 MG/3ML; MG/3ML
3 SOLUTION RESPIRATORY (INHALATION) ONCE
Status: COMPLETED | OUTPATIENT
Start: 2025-01-15 | End: 2025-01-15

## 2025-01-15 RX ORDER — IOPAMIDOL 755 MG/ML
90 INJECTION, SOLUTION INTRAVASCULAR ONCE
Status: COMPLETED | OUTPATIENT
Start: 2025-01-15 | End: 2025-01-15

## 2025-01-15 RX ORDER — DEXAMETHASONE SODIUM PHOSPHATE 10 MG/ML
10 INJECTION, SOLUTION INTRAMUSCULAR; INTRAVENOUS ONCE
Status: COMPLETED | OUTPATIENT
Start: 2025-01-15 | End: 2025-01-15

## 2025-01-15 RX ADMIN — DEXAMETHASONE SODIUM PHOSPHATE 10 MG: 10 INJECTION INTRAMUSCULAR; INTRAVENOUS at 13:57

## 2025-01-15 RX ADMIN — IOPAMIDOL 90 ML: 755 INJECTION, SOLUTION INTRAVENOUS at 13:15

## 2025-01-15 RX ADMIN — IPRATROPIUM BROMIDE AND ALBUTEROL SULFATE 3 ML: .5; 3 SOLUTION RESPIRATORY (INHALATION) at 14:00

## 2025-01-15 ASSESSMENT — ACTIVITIES OF DAILY LIVING (ADL)
ADLS_ACUITY_SCORE: 57

## 2025-01-15 ASSESSMENT — COLUMBIA-SUICIDE SEVERITY RATING SCALE - C-SSRS
1. IN THE PAST MONTH, HAVE YOU WISHED YOU WERE DEAD OR WISHED YOU COULD GO TO SLEEP AND NOT WAKE UP?: NO
2. HAVE YOU ACTUALLY HAD ANY THOUGHTS OF KILLING YOURSELF IN THE PAST MONTH?: NO
6. HAVE YOU EVER DONE ANYTHING, STARTED TO DO ANYTHING, OR PREPARED TO DO ANYTHING TO END YOUR LIFE?: NO

## 2025-01-15 NOTE — ED NOTES
Pts oxygen level after neb and steroid dropped to 89% on room air while she was sleeping.  Oxygen turned back on to 2L NC

## 2025-01-15 NOTE — DISCHARGE INSTRUCTIONS
"You were evaluated in the Emergency Department today for shortness of breath. Your symptoms improved with treatment, and your evaluation did not show evidence of medical conditions requiring emergent intervention at this time.     Please follow up with your primary care physician as instructed  Return to the Emergency Department if you experience worsening shortness of breath, chest pain, headache, lightheadedness, feeling like you are going to \"pass out\" or any other concerning symptoms.    Thank you for choosing us for your care.    "

## 2025-01-15 NOTE — ED NOTES
Pts oxygen sats on 2L NC in bed are around  96%.  With oxygen removed and her standing at bedside her sats are 88%.  When she walked her sats dropped to 80% and she had obvious labored breathing.  Pt states her breathing is more difficult at that time, but states she does feel she is okay to go home.  She states when she had her hiatal hernia surgery recently, she had lower sats, but she was able to recuperate much better at home than she did when she was hospitalized.  When she returned to room she was placed back on 2L NC and her sats returned to 96% within a minute or two.  Pt states she would really prefer to go home and recover than stay in hospital

## 2025-01-15 NOTE — ED PROVIDER NOTES
EMERGENCY DEPARTMENT ENCOUNTER      NAME: Lori Liu  AGE: 74 year old female  YOB: 1950  MRN: [id number]  EVALUATION DATE & TIME: No admission date for patient encounter.    PCP: Sally Coulter    ED PROVIDER: Mauricio Bernstein MD      Chief Complaint   Patient presents with    Fall    Chest Pain    Shortness of Breath         FINAL IMPRESSION:  1. Acute respiratory failure with hypoxia (H)    2. Shortness of breath    3. Chest pain, unspecified type          ED COURSE & MEDICAL DECISION MAKING:    Pertinent Labs & Imaging studies reviewed. (See chart for details)  74 year old female presents to the Emergency Department for evaluation of fall, chest pain, lower extremity pain, and shortness of breath.  Differential diagnosis considered Skull fracture, subdural hematoma, epidural hematoma, ICH, basilar skull fracture, septal hematoma, facial fracture, cervical spine fracture, spinal cord injury, pneumothorax, hemothorax, rib fracture, AAA, intra-abdominal bleeding, perforated viscus, intra-abdominal hematoma, fracture, dislocation, nerve injury, arterial injury, compartment, Myocardial infarction, acute coronary syndrome, congestive heart failure, COPD exacerbation, asthma exacerbation, pneumonia, aspiration, anaphylaxis, pulmonary embolism, pneumothorax, COVID-19, influenza A, anxiety.     Triage Note:      Pt presents to the ED with chest pain and shortness of breath after a fall 2 days ago. SpO2 87% on room air. Improved to 95% on 2L via NC. Takes Xarelto        Patient presents nonsyncopal fall 2 days ago and has anterior chest wall pain.  She has accompanying shortness of breath.  Was noted to be hypoxic in triage and improved with 2 L to 95%.  Obtain broad workup as well as trauma imaging of her head neck and chest to evaluate for pulmonary embolism.  She does take Xarelto however given her symptoms believe PE was higher risk.    ED Course as of 01/16/25 0903   Wed Riki 15, 2025   1347 CT  Head w/o Contrast  IMPRESSION:  HEAD CT:  1.  No acute intracranial process.     CERVICAL SPINE CT:  1.  No CT evidence for acute fracture or post traumatic subluxation.     1347 CT Chest Pulmonary Embolism w Contrast  IMPRESSION:     1.  No pulmonary embolism.     2.  Pulmonary arterial and right ventricular enlargement, suggesting pulmonary hypertension and elevated right pressure.     3.  Mild pulmonary fibrosis, difficult in characterization from motion artifact. Honeycombing may be present, which can be seen with UIP pulmonary fibrosis. Additionally, pulmonary mosaicism is present (cannot exclude gas trapping or chronic   hypersensitivity pneumonitis). Consider follow-up high-resolution chest CT after acute symptoms resolve and pulmonary follow-up.     4.  Mild bronchiectasis.     5.  Mild distal esophageal wall thickening with minimal adjacent stranding / fluid. Correlate for esophagitis.     1354 Patient at bedside.  Oxygen was weaned off.  95% on room air with a good Plath.  Labs otherwise are unremarkable.  Did show bronchiectasis on the CT scan with evidence of PE or traumatic findings.  Will give DuoNebs and Decadron.  Will also obtain viral swabs.  Will trial ambulation.  I anticipate patient be able to go home.  If needing low-dose supplemental oxygen we will arrange for home oxygen delivery.   1447 Influenza A/B, RSV and SARS-CoV2 PCR (COVID-19) Nose  Negative viral.   1503 Discussed that she had difficulty ambulating and actually came hypoxic with labored breathing.  Discussed that I would like to try to get her at home oxygen or even admission for further workup and evaluation.  Patient prefers to be discharged home.  She had similar symptoms when she last had a hiatal hernia and they resolve on her own.  She states she has a pulmonologist was a primary care provider.  She does not want to have at home oxygen.  She prefers to follow-up in the outpatient setting.  I discussed the risks of going home  without further workup or evaluation and she agrees accept all risk.  She has decision-making capacity.  AMA form was signed and patient was discharged home.       CT Pulmonary Angiogram:Patient is moderate to high risk for PE.    Patient was offered to be admitted however left AMA.  In no acute distress    At the conclusion of the encounter I discussed the results of all of the tests and the disposition. The questions were answered. The patient or family acknowledged understanding and was agreeable with the care plan.     MEDICATIONS GIVEN IN THE EMERGENCY:  Medications   iopamidol (ISOVUE-370) solution 90 mL (90 mLs Intravenous $Given 1/15/25 1315)   dexAMETHasone PF (DECADRON) injection 10 mg (10 mg Intravenous $Given 1/15/25 1357)   ipratropium - albuterol 0.5 mg/2.5 mg/3 mL (DUONEB) neb solution 3 mL (3 mLs Nebulization $Given 1/15/25 1400)       NEW PRESCRIPTIONS STARTED AT TODAY'S ER VISIT  Discharge Medication List as of 1/15/2025  3:06 PM        START taking these medications    Details   albuterol (PROAIR HFA/PROVENTIL HFA/VENTOLIN HFA) 108 (90 Base) MCG/ACT inhaler Inhale 2 puffs into the lungs every 4 hours as needed for shortness of breath or wheezing., Disp-18 g, R-0, E-Prescribe      methylPREDNISolone (MEDROL DOSEPAK) 4 MG tablet therapy pack Follow Package Directions, Disp-21 tablet, R-0, E-Prescribe      spacer (OPTICHAMBER GONZALEZ) holding chamber Use with inhaler, Disp-1 each, R-0, E-Prescribe           Discharge Medication List as of 1/15/2025  3:06 PM          =================================================================    HPI    Patient information was obtained from: patient     Use of : N/A         Lori Liu is a 74 year old female with a pertinent history of GERD, osteoarthritis of knees, thrombosis, and PE who presents to this ED for evaluation of fall, chest pain, shortness of breath, and lower extremity pain.     Patient stated that she had a fall on 1/13/25 (2  "days ago), and since has been having left knee swelling, bruising and pain, shortness of breath, dizziness, and chest pain. She fell after getting out of her car and slipping on ice. She did not hit her head, lose consciousness; she is on blood thinners. She is able to move her knee and bear weight. Patient described her chest pain as feeling like a pressure on the right side of her chest when standing up. When she is resting, she has a slight pain in her chest. She described feeling dizzy and short of breath when standing up after sitting.     She reported a history of blood clots in her lungs and knee which she is on blood thinners for. She did not miss any doses and has been taking it as prescribed. She also reported a history of a hiatal hernia that was pressing on her lungs and causing difficulty breathing. She is not on oxygen normally.     She took tramadol (50 mg) at 7:30 AM.     Patient denied any nausea, vomiting, diarrhea, numbness and did not mention any other symptoms/ concerns.    PHYSICAL EXAM    BP (!) 150/70   Pulse 76   Temp 98.4  F (36.9  C)   Resp (!) 50   Ht 1.651 m (5' 5\")   Wt 99.8 kg (220 lb)   SpO2 96%   BMI 36.61 kg/m      Constitutional: No acute distress  Head: Normocephalic, atraumatic, mucous membranes moist, nose normal.   Neck: Supple, gross ROM intact.   Eyes: Pupils mid-range, sclera white.  Chest wall: No ecchymosis or rash over the anterior chest wall.  Is tender to palpation  Respiratory: Clear to auscultation bilaterally, no respiratory distress, no wheezing, speaks full sentences easily.  Cardiovascular: Normal heart rate, regular rhythm, no murmurs.   GI: Soft, no tenderness to deep palpation in all quadrants.  Musculoskeletal: Moving all 4 extremities intentionally and without pain. No obvious deformity. 5/5 strength in lower extremities.   Skin: Warm, dry, no rash.   Ecchymosis over left leg and left knee.   Neurologic: Alert & oriented x 3, speech clear, moving all " extremities spontaneously   Psychiatric: Affect normal, cooperative.       LAB:  All pertinent labs reviewed and interpreted.  Results for orders placed or performed during the hospital encounter of 01/15/25   CT Head w/o Contrast    Impression    IMPRESSION:  HEAD CT:  1.  No acute intracranial process.    CERVICAL SPINE CT:  1.  No CT evidence for acute fracture or post traumatic subluxation.   CT Cervical Spine w/o Contrast    Impression    IMPRESSION:  HEAD CT:  1.  No acute intracranial process.    CERVICAL SPINE CT:  1.  No CT evidence for acute fracture or post traumatic subluxation.   CT Chest Pulmonary Embolism w Contrast    Impression    IMPRESSION:    1.  No pulmonary embolism.    2.  Pulmonary arterial and right ventricular enlargement, suggesting pulmonary hypertension and elevated right pressure.    3.  Mild pulmonary fibrosis, difficult in characterization from motion artifact. Honeycombing may be present, which can be seen with UIP pulmonary fibrosis. Additionally, pulmonary mosaicism is present (cannot exclude gas trapping or chronic   hypersensitivity pneumonitis). Consider follow-up high-resolution chest CT after acute symptoms resolve and pulmonary follow-up.    4.  Mild bronchiectasis.    5.  Mild distal esophageal wall thickening with minimal adjacent stranding / fluid. Correlate for esophagitis.     Extra Blue Top Tube   Result Value Ref Range    Hold Specimen JIC    Extra Green Top (Lithium Heparin) Tube   Result Value Ref Range    Hold Specimen JIC    Extra Purple Top Tube   Result Value Ref Range    Hold Specimen JI    Extra Blood Bank Purple Top Tube   Result Value Ref Range    Hold Specimen JI    Basic metabolic panel   Result Value Ref Range    Sodium 137 135 - 145 mmol/L    Potassium 4.2 3.4 - 5.3 mmol/L    Chloride 102 98 - 107 mmol/L    Carbon Dioxide (CO2) 22 22 - 29 mmol/L    Anion Gap 13 7 - 15 mmol/L    Urea Nitrogen 19.2 8.0 - 23.0 mg/dL    Creatinine 0.79 0.51 - 0.95 mg/dL     GFR Estimate 78 >60 mL/min/1.73m2    Calcium 9.0 8.8 - 10.4 mg/dL    Glucose 112 (H) 70 - 99 mg/dL   Nt probnp inpatient (BNP)   Result Value Ref Range    N terminal Pro BNP Inpatient 240 0 - 900 pg/mL   Result Value Ref Range    Troponin T, High Sensitivity 11 <=14 ng/L   CBC with platelets and differential   Result Value Ref Range    WBC Count 7.0 4.0 - 11.0 10e3/uL    RBC Count 4.15 3.80 - 5.20 10e6/uL    Hemoglobin 13.7 11.7 - 15.7 g/dL    Hematocrit 41.4 35.0 - 47.0 %     78 - 100 fL    MCH 33.0 26.5 - 33.0 pg    MCHC 33.1 31.5 - 36.5 g/dL    RDW 13.9 10.0 - 15.0 %    Platelet Count 258 150 - 450 10e3/uL    % Neutrophils 82 %    % Lymphocytes 10 %    % Monocytes 6 %    % Eosinophils 1 %    % Basophils 0 %    % Immature Granulocytes 1 %    NRBCs per 100 WBC 0 <1 /100    Absolute Neutrophils 5.8 1.6 - 8.3 10e3/uL    Absolute Lymphocytes 0.7 (L) 0.8 - 5.3 10e3/uL    Absolute Monocytes 0.4 0.0 - 1.3 10e3/uL    Absolute Eosinophils 0.1 0.0 - 0.7 10e3/uL    Absolute Basophils 0.0 0.0 - 0.2 10e3/uL    Absolute Immature Granulocytes 0.0 <=0.4 10e3/uL    Absolute NRBCs 0.0 10e3/uL   Result Value Ref Range    Troponin T, High Sensitivity 10 <=14 ng/L   Influenza A/B, RSV and SARS-CoV2 PCR (COVID-19) Nose    Specimen: Nose; Swab   Result Value Ref Range    Influenza A PCR Negative Negative    Influenza B PCR Negative Negative    RSV PCR Negative Negative    SARS CoV2 PCR Negative Negative   ECG 12-LEAD WITH MUSE (L[facility])   Result Value Ref Range    Systolic Blood Pressure  mmHg    Diastolic Blood Pressure  mmHg    Ventricular Rate 74 BPM    Atrial Rate 74 BPM    NH Interval 158 ms    QRS Duration 84 ms     ms    QTc 461 ms    P Axis 32 degrees    R AXIS -53 degrees    T Axis 51 degrees    Interpretation ECG       Sinus rhythm  Left anterior fascicular block  Abnormal ECG  When compared with ECG of 19-Oct-2021 18:25,  Left anterior fascicular block is now Present  Confirmed by SEE ED PROVIDER NOTE FOR, ECG  INTERPRETATION ([id number]),  CONBRUNA CLEMENCIA ([id number]) on 1/15/2025 11:58:29 AM         RADIOLOGY:  Reviewed all pertinent imaging. Please see official radiology report.  CT Chest Pulmonary Embolism w Contrast   Final Result   IMPRESSION:      1.  No pulmonary embolism.      2.  Pulmonary arterial and right ventricular enlargement, suggesting pulmonary hypertension and elevated right pressure.      3.  Mild pulmonary fibrosis, difficult in characterization from motion artifact. Honeycombing may be present, which can be seen with UIP pulmonary fibrosis. Additionally, pulmonary mosaicism is present (cannot exclude gas trapping or chronic    hypersensitivity pneumonitis). Consider follow-up high-resolution chest CT after acute symptoms resolve and pulmonary follow-up.      4.  Mild bronchiectasis.      5.  Mild distal esophageal wall thickening with minimal adjacent stranding / fluid. Correlate for esophagitis.         CT Cervical Spine w/o Contrast   Final Result   IMPRESSION:   HEAD CT:   1.  No acute intracranial process.      CERVICAL SPINE CT:   1.  No CT evidence for acute fracture or post traumatic subluxation.      CT Head w/o Contrast   Final Result   IMPRESSION:   HEAD CT:   1.  No acute intracranial process.      CERVICAL SPINE CT:   1.  No CT evidence for acute fracture or post traumatic subluxation.          EKG:    Performed at: 11:31 AM    Impression: left anterior fascicular block, abnormal ECG    Rate: 74  Rhythm: sinus  GA Interval: 158  QRS Interval: 84  QTc Interval: 461  ST Changes: left anterior fascicular block is now present  Comparison: 10/19/2019    I have independently reviewed and interpreted the EKG(s) documented above.      PROCEDURE:  Critical Care  Performed by: Mauricio Bernstein MD  Authorized by: Mauricio Bernstein MD    Total critical care time: 30 minutes  Critical care time was exclusive of separately billable procedures and treating other patients.  Critical care was necessary to treat or  prevent imminent or life-threatening deterioration of the following conditions: Depress acute hypoxia and ed respiratory failure requiring supplemental oxygen.  Critical care was time spent personally by me on the following activities: development of treatment plan with patient or surrogate, discussions with consultants, examination of patient, evaluation of patient's response to treatment, obtaining history from patient or surrogate, ordering and performing treatments and interventions, ordering and review of laboratory studies, ordering and review of radiographic studies and re-evaluation of patient's condition, this excludes any separately billable procedures.      Mauricio Bernstein MD  [facility] EMERGENCY ROOM  [address] [id number] [phone]   =================================================================    BILLING:  Data  Category 1  Non-ED record review, if applicable. External record reviewed: N/A     Clinical information was obtained from an independent historian. History was obtained from: Patient     The following testing was considered but ultimately not selected after discussion with patient/family: N/A     Category 2  My independent interpretation of EKG, rhythm strip, radiology study: Head CT did not reveal large intracranial hemorrhage and CT abdomen and pelvis did not reveal large AAA     Category 3  Discussion of management with other physician/healthcare provider/other source: N/A       Risk  Prescription medication was considered, but ultimately not given after discussion with patient/family: N/A     Chronic conditions affecting care:  Interstitial lung disease, history of pulmonary embolism on Xarelto     Care significantly affected by Social Determinants of Health: N/A     Consideration of Admission/Observation: Offered admission however patient left AGAINST MEDICAL ADVICE.  I did try to set up at home oxygen given her hypoxia however  she declined.  Will have her follow-up with her pulmonologist primary care provider as above.      I, Danial Garcai, am serving as a scribe to document services personally performed by Mauricio Bernstein MD based on my observation and the provider's statements to me. I, Mauricio Bernstein MD, attest that Danial Garcia is acting in a scribe capacity, has observed my performance of the services and has documented them in accordance with my direction.       Mauricio Bernstein MD  01/16/25 0903

## 2025-01-15 NOTE — PHARMACY-ADMISSION MEDICATION HISTORY
Pharmacist Admission Medication History    Admission medication history is complete. The information provided in this note is only as accurate as the sources available at the time of the update.    Information Source(s): Patient via in-person    Pertinent Information: Patient reported she does not regularly take tramadol, but had left over from a surgery and took a few doses for recent chest pain.     Changes made to PTA medication list:  Added: carboxymethylcellulose drops  Deleted: None  Changed: None    Allergies reviewed with patient and updates made in EHR: yes    Medication History Completed By: Bibiana Chaparro GALLITO 1/15/2025 2:25 PM    PTA Med List   Medication Sig Last Dose/Taking    carboxymethylcellulose PF (REFRESH PLUS) 0.5 % ophthalmic solution Place 1 drop into both eyes 3 times daily as needed for dry eyes. Past Month    cyanocobalamin (VITAMIN B-12) 500 MCG tablet Take 1,000 mcg by mouth daily 1/15/2025 Morning    omeprazole (PRILOSEC) 20 MG DR capsule Take 20 mg by mouth 2 times daily. 1/15/2025 Morning    rivaroxaban ANTICOAGULANT (XARELTO ANTICOAGULANT) 20 MG TABS tablet Take 1 tablet (20 mg) by mouth daily (with dinner). 1/14/2025 Evening    rosuvastatin (CRESTOR) 20 MG tablet Take 20 mg by mouth daily 1/15/2025 Morning    traMADol (ULTRAM) 50 MG tablet Take 1 tablet (50 mg) by mouth every 6 hours as needed for moderate pain. 1/15/2025 Morning    VITRON-C  MG TABS tablet Take 1 tablet by mouth daily 1/15/2025 Morning

## 2025-01-15 NOTE — ED TRIAGE NOTES
Pt presents to the ED with chest pain and shortness of breath after a fall 2 days ago. SpO2 87% on room air. Improved to 95% on 2L via NC. Takes Xarelto     Triage Assessment (Adult)       Row Name 01/15/25 1131          Triage Assessment    Airway WDL WDL        Respiratory WDL    Respiratory WDL X;rhythm/pattern     Rhythm/Pattern, Respiratory shortness of breath        Skin Circulation/Temperature WDL    Skin Circulation/Temperature WDL WDL        Cardiac WDL    Cardiac WDL X;chest pain        Chest Pain Assessment    Chest Pain Location midsternal        Peripheral/Neurovascular WDL    Peripheral Neurovascular WDL WDL        Cognitive/Neuro/Behavioral WDL    Cognitive/Neuro/Behavioral WDL WDL

## 2025-01-19 ENCOUNTER — HOSPITAL ENCOUNTER (OUTPATIENT)
Facility: CLINIC | Age: 75
Setting detail: OBSERVATION
Discharge: HOME | End: 2025-01-20
Attending: EMERGENCY MEDICINE | Admitting: EMERGENCY MEDICINE
Payer: COMMERCIAL

## 2025-01-19 ENCOUNTER — APPOINTMENT (OUTPATIENT)
Dept: ULTRASOUND IMAGING | Facility: CLINIC | Age: 75
End: 2025-01-19
Attending: EMERGENCY MEDICINE
Payer: COMMERCIAL

## 2025-01-19 ENCOUNTER — APPOINTMENT (OUTPATIENT)
Dept: CT IMAGING | Facility: CLINIC | Age: 75
End: 2025-01-19
Attending: EMERGENCY MEDICINE
Payer: COMMERCIAL

## 2025-01-19 DIAGNOSIS — R07.9 ACUTE CHEST PAIN: ICD-10-CM

## 2025-01-19 DIAGNOSIS — R09.02 HYPOXIA: ICD-10-CM

## 2025-01-19 DIAGNOSIS — I10 HYPERTENSION, UNSPECIFIED TYPE: Primary | ICD-10-CM

## 2025-01-19 DIAGNOSIS — R06.00 DYSPNEA, UNSPECIFIED TYPE: ICD-10-CM

## 2025-01-19 LAB
ANION GAP SERPL CALCULATED.3IONS-SCNC: 12 MMOL/L (ref 7–15)
ATRIAL RATE - MUSE: 61 BPM
BASOPHILS # BLD AUTO: 0 10E3/UL (ref 0–0.2)
BASOPHILS NFR BLD AUTO: 0 %
BUN SERPL-MCNC: 18.8 MG/DL (ref 8–23)
CALCIUM SERPL-MCNC: 8.9 MG/DL (ref 8.8–10.4)
CHLORIDE SERPL-SCNC: 105 MMOL/L (ref 98–107)
CREAT SERPL-MCNC: 0.82 MG/DL (ref 0.51–0.95)
DIASTOLIC BLOOD PRESSURE - MUSE: 89 MMHG
EGFRCR SERPLBLD CKD-EPI 2021: 74 ML/MIN/1.73M2
EOSINOPHIL # BLD AUTO: 0 10E3/UL (ref 0–0.7)
EOSINOPHIL NFR BLD AUTO: 0 %
ERYTHROCYTE [DISTWIDTH] IN BLOOD BY AUTOMATED COUNT: 14 % (ref 10–15)
FLUAV RNA SPEC QL NAA+PROBE: NEGATIVE
FLUBV RNA RESP QL NAA+PROBE: NEGATIVE
GLUCOSE SERPL-MCNC: 112 MG/DL (ref 70–99)
HCO3 SERPL-SCNC: 23 MMOL/L (ref 22–29)
HCT VFR BLD AUTO: 40.1 % (ref 35–47)
HGB BLD-MCNC: 13.5 G/DL (ref 11.7–15.7)
IMM GRANULOCYTES # BLD: 0.1 10E3/UL
IMM GRANULOCYTES NFR BLD: 1 %
INTERPRETATION ECG - MUSE: NORMAL
LYMPHOCYTES # BLD AUTO: 0.9 10E3/UL (ref 0.8–5.3)
LYMPHOCYTES NFR BLD AUTO: 10 %
MCH RBC QN AUTO: 33.2 PG (ref 26.5–33)
MCHC RBC AUTO-ENTMCNC: 33.7 G/DL (ref 31.5–36.5)
MCV RBC AUTO: 99 FL (ref 78–100)
MONOCYTES # BLD AUTO: 0.8 10E3/UL (ref 0–1.3)
MONOCYTES NFR BLD AUTO: 9 %
NEUTROPHILS # BLD AUTO: 7.5 10E3/UL (ref 1.6–8.3)
NEUTROPHILS NFR BLD AUTO: 80 %
NRBC # BLD AUTO: 0 10E3/UL
NRBC BLD AUTO-RTO: 0 /100
P AXIS - MUSE: 31 DEGREES
PLATELET # BLD AUTO: 247 10E3/UL (ref 150–450)
POTASSIUM SERPL-SCNC: 3.8 MMOL/L (ref 3.4–5.3)
PR INTERVAL - MUSE: 140 MS
QRS DURATION - MUSE: 94 MS
QT - MUSE: 446 MS
QTC - MUSE: 448 MS
R AXIS - MUSE: -44 DEGREES
RBC # BLD AUTO: 4.07 10E6/UL (ref 3.8–5.2)
RSV RNA SPEC NAA+PROBE: NEGATIVE
SARS-COV-2 RNA RESP QL NAA+PROBE: NEGATIVE
SODIUM SERPL-SCNC: 140 MMOL/L (ref 135–145)
SYSTOLIC BLOOD PRESSURE - MUSE: 190 MMHG
T AXIS - MUSE: 37 DEGREES
TROPONIN T SERPL HS-MCNC: 10 NG/L
TROPONIN T SERPL HS-MCNC: 11 NG/L
VENTRICULAR RATE- MUSE: 61 BPM
WBC # BLD AUTO: 9.4 10E3/UL (ref 4–11)

## 2025-01-19 PROCEDURE — 999N000157 HC STATISTIC RCP TIME EA 10 MIN

## 2025-01-19 PROCEDURE — 96376 TX/PRO/DX INJ SAME DRUG ADON: CPT

## 2025-01-19 PROCEDURE — 36415 COLL VENOUS BLD VENIPUNCTURE: CPT | Performed by: EMERGENCY MEDICINE

## 2025-01-19 PROCEDURE — G0378 HOSPITAL OBSERVATION PER HR: HCPCS

## 2025-01-19 PROCEDURE — 84520 ASSAY OF UREA NITROGEN: CPT | Performed by: EMERGENCY MEDICINE

## 2025-01-19 PROCEDURE — 250N000011 HC RX IP 250 OP 636: Performed by: EMERGENCY MEDICINE

## 2025-01-19 PROCEDURE — 93005 ELECTROCARDIOGRAM TRACING: CPT | Performed by: EMERGENCY MEDICINE

## 2025-01-19 PROCEDURE — 80048 BASIC METABOLIC PNL TOTAL CA: CPT | Performed by: EMERGENCY MEDICINE

## 2025-01-19 PROCEDURE — 96374 THER/PROPH/DIAG INJ IV PUSH: CPT

## 2025-01-19 PROCEDURE — 99285 EMERGENCY DEPT VISIT HI MDM: CPT | Mod: 25

## 2025-01-19 PROCEDURE — 93971 EXTREMITY STUDY: CPT | Mod: LT

## 2025-01-19 PROCEDURE — 99222 1ST HOSP IP/OBS MODERATE 55: CPT | Mod: AI | Performed by: HOSPITALIST

## 2025-01-19 PROCEDURE — 85041 AUTOMATED RBC COUNT: CPT | Performed by: EMERGENCY MEDICINE

## 2025-01-19 PROCEDURE — 85004 AUTOMATED DIFF WBC COUNT: CPT | Performed by: EMERGENCY MEDICINE

## 2025-01-19 PROCEDURE — 250N000009 HC RX 250: Performed by: EMERGENCY MEDICINE

## 2025-01-19 PROCEDURE — 250N000013 HC RX MED GY IP 250 OP 250 PS 637: Performed by: HOSPITALIST

## 2025-01-19 PROCEDURE — 71275 CT ANGIOGRAPHY CHEST: CPT

## 2025-01-19 PROCEDURE — 84484 ASSAY OF TROPONIN QUANT: CPT | Performed by: EMERGENCY MEDICINE

## 2025-01-19 PROCEDURE — 87637 SARSCOV2&INF A&B&RSV AMP PRB: CPT | Performed by: EMERGENCY MEDICINE

## 2025-01-19 PROCEDURE — 94640 AIRWAY INHALATION TREATMENT: CPT

## 2025-01-19 RX ORDER — ACETAMINOPHEN 325 MG/1
975 TABLET ORAL ONCE
Status: DISCONTINUED | OUTPATIENT
Start: 2025-01-19 | End: 2025-01-19

## 2025-01-19 RX ORDER — ONDANSETRON 2 MG/ML
4 INJECTION INTRAMUSCULAR; INTRAVENOUS EVERY 6 HOURS PRN
Status: DISCONTINUED | OUTPATIENT
Start: 2025-01-19 | End: 2025-01-20 | Stop reason: HOSPADM

## 2025-01-19 RX ORDER — TRAMADOL HYDROCHLORIDE 50 MG/1
50 TABLET ORAL EVERY 6 HOURS PRN
COMMUNITY

## 2025-01-19 RX ORDER — TRAMADOL HYDROCHLORIDE 50 MG/1
50 TABLET ORAL EVERY 6 HOURS PRN
Status: DISCONTINUED | OUTPATIENT
Start: 2025-01-19 | End: 2025-01-19

## 2025-01-19 RX ORDER — NALOXONE HYDROCHLORIDE 0.4 MG/ML
0.4 INJECTION, SOLUTION INTRAMUSCULAR; INTRAVENOUS; SUBCUTANEOUS
Status: DISCONTINUED | OUTPATIENT
Start: 2025-01-19 | End: 2025-01-20 | Stop reason: HOSPADM

## 2025-01-19 RX ORDER — HYDROMORPHONE HYDROCHLORIDE 1 MG/ML
0.5 INJECTION, SOLUTION INTRAMUSCULAR; INTRAVENOUS; SUBCUTANEOUS
Status: COMPLETED | OUTPATIENT
Start: 2025-01-19 | End: 2025-01-19

## 2025-01-19 RX ORDER — IPRATROPIUM BROMIDE AND ALBUTEROL SULFATE 2.5; .5 MG/3ML; MG/3ML
3 SOLUTION RESPIRATORY (INHALATION) ONCE
Status: COMPLETED | OUTPATIENT
Start: 2025-01-19 | End: 2025-01-19

## 2025-01-19 RX ORDER — AMOXICILLIN 250 MG
1 CAPSULE ORAL 2 TIMES DAILY PRN
Status: DISCONTINUED | OUTPATIENT
Start: 2025-01-19 | End: 2025-01-20 | Stop reason: HOSPADM

## 2025-01-19 RX ORDER — AMOXICILLIN 250 MG
1 CAPSULE ORAL DAILY
Status: DISCONTINUED | OUTPATIENT
Start: 2025-01-20 | End: 2025-01-20 | Stop reason: HOSPADM

## 2025-01-19 RX ORDER — ONDANSETRON 4 MG/1
4 TABLET, ORALLY DISINTEGRATING ORAL EVERY 6 HOURS PRN
Status: DISCONTINUED | OUTPATIENT
Start: 2025-01-19 | End: 2025-01-20 | Stop reason: HOSPADM

## 2025-01-19 RX ORDER — ROSUVASTATIN CALCIUM 5 MG/1
20 TABLET, COATED ORAL DAILY
Status: DISCONTINUED | OUTPATIENT
Start: 2025-01-20 | End: 2025-01-20 | Stop reason: HOSPADM

## 2025-01-19 RX ORDER — NALOXONE HYDROCHLORIDE 0.4 MG/ML
0.2 INJECTION, SOLUTION INTRAMUSCULAR; INTRAVENOUS; SUBCUTANEOUS
Status: DISCONTINUED | OUTPATIENT
Start: 2025-01-19 | End: 2025-01-20 | Stop reason: HOSPADM

## 2025-01-19 RX ORDER — ACETAMINOPHEN 325 MG/1
650 TABLET ORAL EVERY 6 HOURS PRN
Status: DISCONTINUED | OUTPATIENT
Start: 2025-01-19 | End: 2025-01-20 | Stop reason: HOSPADM

## 2025-01-19 RX ORDER — AMOXICILLIN 250 MG
1 CAPSULE ORAL DAILY
COMMUNITY

## 2025-01-19 RX ORDER — PANTOPRAZOLE SODIUM 40 MG/1
40 TABLET, DELAYED RELEASE ORAL 2 TIMES DAILY
Status: DISCONTINUED | OUTPATIENT
Start: 2025-01-19 | End: 2025-01-20 | Stop reason: HOSPADM

## 2025-01-19 RX ORDER — TRAMADOL HYDROCHLORIDE 50 MG/1
50 TABLET ORAL EVERY 6 HOURS PRN
Status: DISCONTINUED | OUTPATIENT
Start: 2025-01-19 | End: 2025-01-20 | Stop reason: HOSPADM

## 2025-01-19 RX ORDER — AMOXICILLIN 250 MG
2 CAPSULE ORAL 2 TIMES DAILY PRN
Status: DISCONTINUED | OUTPATIENT
Start: 2025-01-19 | End: 2025-01-20 | Stop reason: HOSPADM

## 2025-01-19 RX ORDER — CARBOXYMETHYLCELLULOSE SODIUM 5 MG/ML
1 SOLUTION/ DROPS OPHTHALMIC 3 TIMES DAILY PRN
Status: DISCONTINUED | OUTPATIENT
Start: 2025-01-19 | End: 2025-01-20 | Stop reason: HOSPADM

## 2025-01-19 RX ORDER — PROCHLORPERAZINE MALEATE 5 MG/1
5 TABLET ORAL EVERY 6 HOURS PRN
Status: DISCONTINUED | OUTPATIENT
Start: 2025-01-19 | End: 2025-01-20 | Stop reason: HOSPADM

## 2025-01-19 RX ORDER — IOPAMIDOL 755 MG/ML
75 INJECTION, SOLUTION INTRAVASCULAR ONCE
Status: COMPLETED | OUTPATIENT
Start: 2025-01-19 | End: 2025-01-19

## 2025-01-19 RX ADMIN — RIVAROXABAN 20 MG: 20 TABLET, FILM COATED ORAL at 17:50

## 2025-01-19 RX ADMIN — Medication 5 MG: at 21:29

## 2025-01-19 RX ADMIN — IPRATROPIUM BROMIDE AND ALBUTEROL SULFATE 3 ML: .5; 3 SOLUTION RESPIRATORY (INHALATION) at 13:47

## 2025-01-19 RX ADMIN — IOPAMIDOL 75 ML: 755 INJECTION, SOLUTION INTRAVENOUS at 12:19

## 2025-01-19 RX ADMIN — HYDROMORPHONE HYDROCHLORIDE 0.5 MG: 1 INJECTION, SOLUTION INTRAMUSCULAR; INTRAVENOUS; SUBCUTANEOUS at 13:36

## 2025-01-19 RX ADMIN — HYDROMORPHONE HYDROCHLORIDE 0.5 MG: 1 INJECTION, SOLUTION INTRAMUSCULAR; INTRAVENOUS; SUBCUTANEOUS at 11:44

## 2025-01-19 RX ADMIN — TRAMADOL HYDROCHLORIDE 25 MG: 50 TABLET, FILM COATED ORAL at 21:28

## 2025-01-19 RX ADMIN — PANTOPRAZOLE SODIUM 40 MG: 40 TABLET, DELAYED RELEASE ORAL at 20:46

## 2025-01-19 ASSESSMENT — ACTIVITIES OF DAILY LIVING (ADL)
ADLS_ACUITY_SCORE: 60
ADLS_ACUITY_SCORE: 57
ADLS_ACUITY_SCORE: 56
ADLS_ACUITY_SCORE: 56
ADLS_ACUITY_SCORE: 57
ADLS_ACUITY_SCORE: 57
ADLS_ACUITY_SCORE: 60
ADLS_ACUITY_SCORE: 57
ADLS_ACUITY_SCORE: 57
ADLS_ACUITY_SCORE: 56

## 2025-01-19 ASSESSMENT — COLUMBIA-SUICIDE SEVERITY RATING SCALE - C-SSRS
2. HAVE YOU ACTUALLY HAD ANY THOUGHTS OF KILLING YOURSELF IN THE PAST MONTH?: NO
6. HAVE YOU EVER DONE ANYTHING, STARTED TO DO ANYTHING, OR PREPARED TO DO ANYTHING TO END YOUR LIFE?: NO
1. IN THE PAST MONTH, HAVE YOU WISHED YOU WERE DEAD OR WISHED YOU COULD GO TO SLEEP AND NOT WAKE UP?: NO

## 2025-01-19 NOTE — H&P
[facility]    History and Physical - Hospitalist Service       Date of Admission:  1/19/2025    Assessment & Plan      Lori Liu is a 74 year old female presenting with a chief complaint of chest pain and dyspnea.  She is clinically stable, on supplemental oxygen.  Etiology of chest pain and dyspnea is not clear.  She has features which are reassuring against ACS, namely production of pain to palpation on exam, temporal relationship to injury, and lack of troponin elevation.  The positional nature of pain raises concern for pericarditis.  However, she had a TTE four days ago which did not demonstrate pericarditis.  The CT chest was otherwise unrevealing for an acute infectious process.  Crackles on exam could be consistent with atelectasis. Plan to monitor clinically for evolution/recurrence of chest pain, continue on telemetry.    During her hospitalization in Aug 2024, she developed pulmonary emboli despite being on therapeutic anticoagulation.  Unclear if this occurred due to patient holding anticoagulation for her planned surgery at that time.  Further evaluation may be needed.    # Acute hypoxic respiratory failure  # Chest pain  - telemetry  - incentive spirometry    # Hx VTE  - rivaroxaban    # GERD    # SHASHI  - CPAP    # Outpatient followup  - esophagitis on CT  - pulmonary fibrosis on CT  - possible pulmonary hypertension       Observation Goals: -diagnostic tests and consults completed and resulted, Nurse to notify provider when observation goals have been met and patient is ready for discharge.  Diet: Regular Diet Adult  Sierra Catheter: Not present  Lines: None     Cardiac Monitoring: None  Code Status: No CPR- Pre-arrest intubation OK    Clinically Significant Risk Factors Present on Admission                # Drug Induced Coagulation Defect: home medication list includes an anticoagulant medication              # Obesity: Estimated body mass index is 36.61 kg/m  as  "calculated from the following:    Height as of 1/15/25: 1.651 m (5' 5\").    Weight as of this encounter: 99.8 kg (220 lb).              Disposition Plan     Medically Ready for Discharge: Anticipated in 2-4 Days           Niall Florentino MD  Hospitalist Service  [facility]  Securely message with [employer] (more info)  Text page via [facility] Paging/Directory     ______________________________________________________________________    Chief Complaint   Chest pain, dyspnea    History is obtained from the patient    History of Present Illness   Lori Liu is a 74 year old female who presents with a chief complaint of chest pain and dyspnea.      She reports having a fall six days ago.  She had a fall on ice getting out of her car.  She hit the left side of her knee as well as her chest.  She got help up and was able to walk.  She noted sore chest pain.  She did not immediately seek medical attention.  Two days later, she continued to have chest soreness and went to the ED for evaluation.  She was noted to have hypoxia at that time.  She decided to go home.  Two days ago, she developed left lower leg pain.  The chest pain had mild improvement at that time.    This morning, she developed central chest pain with radiation to the back.  The pain was persistent and sharp in quality.  The pain was worse with walking and improved with sitting.  The pain is worse with coughing.  The pain was worse with lying down and improved with sitting up.  The episode lasted for about an hour.  She was given pain mediation in the ED.  The pain has since resolved.    She reports chronic dyspnea.  Her breathing became worse after the fall.  Breathing is worse with exertion.  She denies fevers or chills.  No sick contacts in the last week.  She does not normally wear oxygen.      Past Medical History    Past Medical History:   Diagnosis Date    Anemia, iron deficiency     Antiplatelet or antithrombotic long-term " use     Gastroesophageal reflux disease     Gastroesophageal reflux disease with esophagitis without hemorrhage     Mixed hyperlipidemia     SHASHI (obstructive sleep apnea)     Osteoarthritis of both knees     Pulmonary embolism (H)     on lifelong anticoagulation    Thrombosis        Past Surgical History   Past Surgical History:   Procedure Laterality Date    LAPAROSCOPIC NISSEN FUNDOPLICATION N/A 8/27/2024    Procedure: paraesophageal hernia repair with mesh and partial fundoplication, ROBOT-ASSISTED, LAPAROSCOPIC;  Surgeon: Lisandro Farr DO;  Location: [facility] Main OR       Prior to Admission Medications   Prior to Admission Medications   Prescriptions Last Dose Informant Patient Reported? Taking?   VITRON-C  MG TABS tablet 1/19/2025 Morning  Yes Yes   Sig: Take 1 tablet by mouth daily   albuterol (PROAIR HFA/PROVENTIL HFA/VENTOLIN HFA) 108 (90 Base) MCG/ACT inhaler 1/19/2025 Morning  No Yes   Sig: Inhale 2 puffs into the lungs every 4 hours as needed for shortness of breath or wheezing.   carboxymethylcellulose PF (REFRESH PLUS) 0.5 % ophthalmic solution 1/17/2025  Yes Yes   Sig: Place 1 drop into both eyes 3 times daily as needed for dry eyes.   cyanocobalamin (VITAMIN B-12) 500 MCG tablet 1/19/2025 Morning  Yes Yes   Sig: Take 1,000 mcg by mouth daily   methylPREDNISolone (MEDROL DOSEPAK) 4 MG tablet therapy pack 1/19/2025 Morning  No Yes   Sig: Follow Package Directions   omeprazole (PRILOSEC) 20 MG DR capsule 1/19/2025 Morning  Yes Yes   Sig: Take 20 mg by mouth 2 times daily.   rivaroxaban ANTICOAGULANT (XARELTO ANTICOAGULANT) 20 MG TABS tablet 1/18/2025 Evening  No Yes   Sig: Take 1 tablet (20 mg) by mouth daily (with dinner).   rosuvastatin (CRESTOR) 20 MG tablet 1/19/2025 Morning  Yes Yes   Sig: Take 20 mg by mouth daily   senna-docusate (SENOKOT-S/PERICOLACE) 8.6-50 MG tablet More than a month  Yes Yes   Sig: Take 1 tablet by mouth daily.   spacer (OPTICHAMBER GONZALEZ) holding chamber    No No   Sig: Use with inhaler   traMADol (ULTRAM) 50 MG tablet Past Week  Yes Yes   Sig: Take 50 mg by mouth every 6 hours as needed for severe pain.   umeclidinium-vilanterol (ANORO ELLIPTA) 62.5-25 MCG/ACT oral inhaler 1/19/2025 Morning  Yes Yes   Sig: Inhale 1 puff into the lungs every morning.      Facility-Administered Medications: None           Physical Exam   Vital Signs: Temp: 98.9  F (37.2  C) Temp src: Temporal BP: (!) 160/85 Pulse: 71   Resp: 18 SpO2: 95 % O2 Device: Nasal cannula Oxygen Delivery: 2 LPM  Weight: 220 lbs 0 oz    Gen:  lying in bed in no extremis  Neuro:  alert, conversant  CV:  nl rate, regular rhythm  Pulm:  no acute resp distress, bibasilar crackles  GI:  abdomen NTTP  Ext:  mild lower extremity  MSK:  TTP in the lower sternum      Medical Decision Making             Data   Reviewed:    Na 140  K 3.8  BUN 19  Cr 0.82    Trop 11    WBC 9.4  Hgb 14  Plts 247    CTA chest  1. No pulmonary embolism.     LE US  1. No deep venous thrombosis in the left lower extremity.

## 2025-01-19 NOTE — ED TRIAGE NOTES
Pt c/o CP and SOB x5-6 days. States she was seen in this ED four days ago after a fall and admission was recommended d/t pt's SOB at that time. Pt states SOB has gotten worse and she now has sharp pain in the LLE and sharp pain in the R chest that radiates to the back. O2 85% on RA in triage. 4L O2 via NC initiated.      Triage Assessment (Adult)       Row Name 01/19/25 1059          Triage Assessment    Airway WDL WDL        Respiratory WDL    Respiratory WDL X;rhythm/pattern     Rhythm/Pattern, Respiratory tachypneic        Skin Circulation/Temperature WDL    Skin Circulation/Temperature WDL WDL        Cardiac WDL    Cardiac WDL X;chest pain        Peripheral/Neurovascular WDL    Peripheral Neurovascular WDL WDL        Cognitive/Neuro/Behavioral WDL    Cognitive/Neuro/Behavioral WDL WDL

## 2025-01-19 NOTE — PHARMACY-ADMISSION MEDICATION HISTORY
Pharmacy Intern Admission Medication History    Admission medication history is complete. The information provided in this note is only as accurate as the sources available at the time of the update.    Information Source(s): Patient and CareEverywhere/SureScripts via in-person    Pertinent Information: anoro ellipta: She says she hasn't been using but started using this past Wednesday and has been taking 1 puff qam     Methylprednisolone: dose pack on day 3 and has taken 1 dose today ( 1 tablet before breakfast). 3 more doses today     Changes made to PTA medication list:  Added: anoro ellipta, senna-s  Deleted: None  Changed: None    Allergies reviewed with patient and updates made in EHR: yes    Medication History Completed By: Jayla Spencer 1/19/2025 1:43 PM    PTA Med List   Medication Sig Last Dose/Taking    albuterol (PROAIR HFA/PROVENTIL HFA/VENTOLIN HFA) 108 (90 Base) MCG/ACT inhaler Inhale 2 puffs into the lungs every 4 hours as needed for shortness of breath or wheezing. 1/19/2025 Morning    carboxymethylcellulose PF (REFRESH PLUS) 0.5 % ophthalmic solution Place 1 drop into both eyes 3 times daily as needed for dry eyes. 1/17/2025    cyanocobalamin (VITAMIN B-12) 500 MCG tablet Take 1,000 mcg by mouth daily 1/19/2025 Morning    methylPREDNISolone (MEDROL DOSEPAK) 4 MG tablet therapy pack Follow Package Directions 1/19/2025 Morning    omeprazole (PRILOSEC) 20 MG DR capsule Take 20 mg by mouth 2 times daily. 1/19/2025 Morning    rivaroxaban ANTICOAGULANT (XARELTO ANTICOAGULANT) 20 MG TABS tablet Take 1 tablet (20 mg) by mouth daily (with dinner). 1/18/2025 Evening    rosuvastatin (CRESTOR) 20 MG tablet Take 20 mg by mouth daily 1/19/2025 Morning    senna-docusate (SENOKOT-S/PERICOLACE) 8.6-50 MG tablet Take 1 tablet by mouth daily. More than a month    traMADol (ULTRAM) 50 MG tablet Take 50 mg by mouth every 6 hours as needed for severe pain. Past Week    umeclidinium-vilanterol (ANORO ELLIPTA) 62.5-25  MCG/ACT oral inhaler Inhale 1 puff into the lungs every morning. 1/19/2025 Morning    VITRON-C  MG TABS tablet Take 1 tablet by mouth daily 1/19/2025 Morning

## 2025-01-19 NOTE — ED PROVIDER NOTES
Emergency Department Encounter      NAME: Lori Liu  AGE: 74 year old female  YOB: 1950  MRN: [id number]  EVALUATION DATE & TIME: 2025 11:04 AM    PCP: Sally Coulter    ED PROVIDER: Emigdio Rivera M.D.      Chief Complaint   Patient presents with    Shortness of Breath    Leg Pain    Chest Pain         FINAL IMPRESSION:  1. Acute chest pain    2. Hypoxia    3. Dyspnea, unspecified type        MEDICAL DECISION MAKIN:05 AM I met with the patient, obtained history, performed an initial exam, and discussed options and plan for diagnostics and treatment here in the ED.   1:21 PM Updated patient regarding results. Rechecked and reevaluated the patient.  1:39 PM I discussed the case with hospitalist, Dr Florentino, who accepts the patient for admission.    This patient is a 74-year-old female with a history of pulmonary embolism who presents with shortness of breath, chest pain and leg pain.  She is currently on Xarelto for the pulmonary embolism.  She was seen on 15 January for chest pain and shortness of breath after a fall.  She was found to be hypoxic with sats in the 80s at that time.  CT scans were done including a chest CT and they did not show any fractures.  The chest CT was read as showing some findings  suggestive of pulmonary hypertension and elevated right heart pressure as well as pulmonary fibrosis.  When she presented to the ER today her sats were in the high 80s again.  She needed 1 to 2 L of nasal cannula oxygen to get her O2 sats in the 90s..  She had extensive bruising of her left lower leg but no evidence of active bleeding or hematoma.  I did a Doppler ultrasound of the left leg which was negative for DVT.  Additionally a CT of the chest was done which also was negative for PE.  I independently reviewed and interpreted the CT.  I admitted the patient to the hospitalist because of her oxygen requirement.  I did try a DuoNeb in the ER.  Her pain was treated with  Tylenol and Dilaudid for the pain.       Pertinent Labs & Imaging studies reviewed. (See chart for details)    The importance of close follow up was discussed. We reviewed warning signs and symptoms, and I instructed Ms. Liu to return to the emergency department immediately if she develops any new or worsening symptoms. I provided additional verbal discharge instructions. Ms. Liu expressed understanding and agreement with this plan of care, her questions were answered, and she was discharged in stable condition.     Medical Decision Making     History:  Supplemental history from: Documented in chart, if applicable  External Record(s) reviewed: Documented in chart, if applicable.     Work Up:  Chart documentation includes differential considered and any EKGs or imaging independently interpreted by provider, where specified.  In additional to work up documented, I considered the following work up: Documented in chart, if applicable.     External consultation:  Discussion of management with another provider: Documented in chart, if applicable     Complicating factors:  Care impacted by chronic illness: PE, hyperlipidemia, GERD, interstitial lung disease  Care affected by social determinants of health: Access to Medical Care     Disposition considerations: Admit      MEDICATIONS GIVEN IN THE EMERGENCY:  Medications   acetaminophen (TYLENOL) tablet 975 mg (0 mg Oral Hold 1/19/25 1255)   HYDROmorphone (PF) (DILAUDID) injection 0.5 mg (0.5 mg Intravenous $Given 1/19/25 1144)   iopamidol (ISOVUE-370) solution 75 mL (75 mLs Intravenous $Given 1/19/25 1219)   ipratropium - albuterol 0.5 mg/2.5 mg/3 mL (DUONEB) neb solution 3 mL (3 mLs Nebulization $Given 1/19/25 1347)   HYDROmorphone (PF) (DILAUDID) injection 0.5 mg (0.5 mg Intravenous $Given 1/19/25 1336)       NEW PRESCRIPTIONS STARTED AT TODAY'S ER VISIT:  New Prescriptions    No medications on file           =================================================================    HPI    Patient information was obtained from: patient     Use of : N/A         Lori Liu is a 74 year old female with a past medical history of GERD, PE, hyperlipidemia, hiatal hernia, hypoxia, interstitial lung disease, and s/p repair of paraesophageal hernia, who presents to the ED for evaluation of chest pain, shortness of breath, and leg pain.    Patient reports a history of blood clots in her lungs. She is currently on Xeralto and is taking the medication as prescribed. No history of COPD and no oxygen at baseline. Patient reports her chest pain started this morning when she was at [facility] around 10 AM. Patient states she was just walking around the store when suddenly she got this sharp, stabbing pain in her chest. Patient also had increased shortness of breath. She states her breathing problems got worse after the chest pain. Patient states her back feels heavy and pressurized. The chest pain is radiating to the back of her right shoulder. Patient also states she is coughing up phlegm. She states the color is slightly yellow. Patient also had a difficult time getting up and out of her bed or chair. She states she is getting really short of breath after doing these things.    Patient reports she had a fall last week and was seen in the ED. Patient has bruising on her left leg from falling an injuring it. She had CT scans done. It showed no fractures just minimal inflammation. Patient was prescribed prednisone and an inhaler. Patient states the Provider wanted to admit her into the hospital but patient declined. Patient states today her left leg is in pain because of the fall. The leg is also black and purple. Patient states she took tylenol this morning for the pain.     Patient denies fever, chills, sore throat, runny nose, headaches, body aches, abdominal pian, or any other complaints at this time.     Per chart  review,  Patient was seen on 01/15/2025 for evaluation of fall, leg pain, chest pain, and shortness of breath. Patient presents nonsyncopal fall 2 days ago and has anterior chest wall pain. She has accompanying shortness of breath. Was noted to be hypoxic in triage and improved with 2 L to 95%.   CT Head w/o Contrast  IMPRESSION:  1.  No acute intracranial process.    CT Cervical Spine w/o Contrast   CERVICAL SPINE CT:  1.  No CT evidence for acute fracture or post traumatic subluxation.    CT Chest Pulmonary Embolism w Contrast  IMPRESSION:  1.  No pulmonary embolism.  2.  Pulmonary arterial and right ventricular enlargement, suggesting pulmonary hypertension and elevated right pressure.   3.  Mild pulmonary fibrosis, difficult in characterization from motion artifact. Honeycombing may be present, which can be seen with UIP pulmonary fibrosis. Additionally, pulmonary mosaicism is present (cannot exclude gas trapping or chronic hypersensitivity pneumonitis). Consider follow-up high-resolution chest CT after acute symptoms resolve and pulmonary follow-up.  4.  Mild bronchiectasis.  5.  Mild distal esophageal wall thickening with minimal adjacent stranding / fluid. Correlate for esophagitis.    CT Pulmonary Angiogram:Patient is moderate to high risk for PE.  Patient was offered to be admitted however left AMA. In no acute distress. Patient was prescribed albuterol and prednisone.       REVIEW OF SYSTEMS   Review of Systems     PAST MEDICAL HISTORY:  Past Medical History:   Diagnosis Date    Anemia, iron deficiency     Antiplatelet or antithrombotic long-term use     Gastroesophageal reflux disease     Gastroesophageal reflux disease with esophagitis without hemorrhage     Mixed hyperlipidemia     SHASHI (obstructive sleep apnea)     Osteoarthritis of both knees     Pulmonary embolism (H)     on lifelong anticoagulation    Thrombosis        PAST SURGICAL HISTORY:  Past Surgical History:   Procedure Laterality Date     LAPAROSCOPIC NISSEN FUNDOPLICATION N/A 8/27/2024    Procedure: paraesophageal hernia repair with mesh and partial fundoplication, ROBOT-ASSISTED, LAPAROSCOPIC;  Surgeon: Lisandro Farr DO;  Location: [facility] Main OR       CURRENT MEDICATIONS:      Current Facility-Administered Medications:     acetaminophen (TYLENOL) tablet 975 mg, 975 mg, Oral, Once, Emigdio Rivera MD    Current Outpatient Medications:     albuterol (PROAIR HFA/PROVENTIL HFA/VENTOLIN HFA) 108 (90 Base) MCG/ACT inhaler, Inhale 2 puffs into the lungs every 4 hours as needed for shortness of breath or wheezing., Disp: 18 g, Rfl: 0    carboxymethylcellulose PF (REFRESH PLUS) 0.5 % ophthalmic solution, Place 1 drop into both eyes 3 times daily as needed for dry eyes., Disp: , Rfl:     cyanocobalamin (VITAMIN B-12) 500 MCG tablet, Take 1,000 mcg by mouth daily, Disp: , Rfl:     methylPREDNISolone (MEDROL DOSEPAK) 4 MG tablet therapy pack, Follow Package Directions, Disp: 21 tablet, Rfl: 0    omeprazole (PRILOSEC) 20 MG DR capsule, Take 20 mg by mouth 2 times daily., Disp: , Rfl:     rivaroxaban ANTICOAGULANT (XARELTO ANTICOAGULANT) 20 MG TABS tablet, Take 1 tablet (20 mg) by mouth daily (with dinner)., Disp: 90 tablet, Rfl: 0    rosuvastatin (CRESTOR) 20 MG tablet, Take 20 mg by mouth daily, Disp: , Rfl:     senna-docusate (SENOKOT-S/PERICOLACE) 8.6-50 MG tablet, Take 1 tablet by mouth daily., Disp: , Rfl:     traMADol (ULTRAM) 50 MG tablet, Take 50 mg by mouth every 6 hours as needed for severe pain., Disp: , Rfl:     umeclidinium-vilanterol (ANORO ELLIPTA) 62.5-25 MCG/ACT oral inhaler, Inhale 1 puff into the lungs every morning., Disp: , Rfl:     VITRON-C  MG TABS tablet, Take 1 tablet by mouth daily, Disp: , Rfl:     spacer (OPTICHAMBER GONZALEZ) holding chamber, Use with inhaler, Disp: 1 each, Rfl: 0    ALLERGIES:  Allergies   Allergen Reactions    Cephalexin Itching and Rash    Penicillins Hives, Itching and Rash       FAMILY  HISTORY:  Family History   Problem Relation Age of Onset    Colon Cancer Mother     Clotting Disorder Father         Does not recall exact issue with bleeding vs clotting disorder    Lymphoma Sister     LUNG DISEASE No family hx of        SOCIAL HISTORY:   Social History     Socioeconomic History    Marital status:    Tobacco Use    Smoking status: Former     Types: Cigarettes    Smokeless tobacco: Never   Vaping Use    Vaping status: Never Used   Substance and Sexual Activity    Alcohol use: Never    Drug use: Never     Social Drivers of Health     Financial Resource Strain: Low Risk  (8/27/2024)    Financial Resource Strain     Within the past 12 months, have you or your family members you live with been unable to get utilities (heat, electricity) when it was really needed?: No   Food Insecurity: Low Risk  (8/27/2024)    Food Insecurity     Within the past 12 months, did you worry that your food would run out before you got money to buy more?: No     Within the past 12 months, did the food you bought just not last and you didn t have money to get more?: No   Transportation Needs: Low Risk  (8/27/2024)    Transportation Needs     Within the past 12 months, has lack of transportation kept you from medical appointments, getting your medicines, non-medical meetings or appointments, work, or from getting things that you need?: No   Interpersonal Safety: High Risk (8/27/2024)    Interpersonal Safety     Do you feel physically and emotionally safe where you currently live?: No     Within the past 12 months, have you been hit, slapped, kicked or otherwise physically hurt by someone?: No     Within the past 12 months, have you been humiliated or emotionally abused in other ways by your partner or ex-partner?: No   Housing Stability: Low Risk  (8/27/2024)    Housing Stability     Do you have housing? : Yes     Are you worried about losing your housing?: No       PHYSICAL EXAM:    Vitals: BP (!) 186/100   Pulse 69    Temp 98.9  F (37.2  C) (Temporal)   Resp 24   Wt 99.8 kg (220 lb)   SpO2 97%   BMI 36.61 kg/m     Constitutional: Alert, fluent who appears slightly tachypneic  HEAD:Normocephalic, atraumatic,   Eyes: PERRLA, EOM intact, conjunctiva clear, no discharge  ENT: mucous membranes moist, nose normal.   Neck- Supple, gross ROM intact.  No JVD.  No palpable nodes.  Pulmonary: Clear to auscultation bilaterally, no respiratory distress, no wheezing, speaks full sentences easily.  Chest: No chest wall tenderness  Cardiovascular: Normal heart rate, regular rhythm, no murmurs. No lower extremity edema, 2+ DP pulses.   GI: Soft, no tenderness to deep palpation in all quadrants, not distended, no masses.  No hepatosplenomegaly.  Musculoskeletal: Moving all 4 extremities intentionally and without pain. No obvious deformity. Extensive bruising over the left lower leg  Back: No CVA tenderness  Skin: Warm, dry, no rash.  Neurologic: Alert & oriented x 3, speech clear, moving all extremities spontaneously   Psychiatric: Affect normal, cooperative.     LAB:  All pertinent labs reviewed and interpreted.  Labs Ordered and Resulted from Time of ED Arrival to Time of ED Departure   BASIC METABOLIC PANEL - Abnormal       Result Value    Sodium 140      Potassium 3.8      Chloride 105      Carbon Dioxide (CO2) 23      Anion Gap 12      Urea Nitrogen 18.8      Creatinine 0.82      GFR Estimate 74      Calcium 8.9      Glucose 112 (*)    CBC WITH PLATELETS AND DIFFERENTIAL - Abnormal    WBC Count 9.4      RBC Count 4.07      Hemoglobin 13.5      Hematocrit 40.1      MCV 99      MCH 33.2 (*)     MCHC 33.7      RDW 14.0      Platelet Count 247      % Neutrophils 80      % Lymphocytes 10      % Monocytes 9      % Eosinophils 0      % Basophils 0      % Immature Granulocytes 1      NRBCs per 100 WBC 0      Absolute Neutrophils 7.5      Absolute Lymphocytes 0.9      Absolute Monocytes 0.8      Absolute Eosinophils 0.0      Absolute Basophils 0.0       Absolute Immature Granulocytes 0.1      Absolute NRBCs 0.0     INFLUENZA A/B, RSV AND SARS-COV2 PCR - Normal    Influenza A PCR Negative      Influenza B PCR Negative      RSV PCR Negative      SARS CoV2 PCR Negative     TROPONIN T, HIGH SENSITIVITY       RADIOLOGY:  US Lower Extremity Venous Duplex Left   Final Result   IMPRESSION:   1.  No deep venous thrombosis in the left lower extremity.         CT Chest Pulmonary Embolism w Contrast   Final Result   IMPRESSION:   1.  No pulmonary embolism.          EKG:   Performed at: 10:58 on 01/19/2025  Impression: Sinus rhythm. Left axis deviation. Minimal voltage criteria for LVH, may be normal variant. Possible Anterolateral infarct, age undetermined. Abnormal ECG.  Rate: 61  Rhythm: sinus rhythm  QRS Interval: 94  QTc Interval: 446/448  Comparison: When compared with ECG of 01/15/2025, No significant change was found.  I have independently reviewed and interpreted the EKG(s) documented above.       I, Patrick Ponce, am serving as a scribe to document services personally performed by Dr. Emigdio Rivera based on my observation and the provider's statements to me. I, Emigdio Rivera M.D. attest that Patrick Ponce is acting in a scribe capacity, has observed my performance of the services and has documented them in accordance with my direction.      Emigdio Rivera M.D.  Emergency Medicine  [facility] EMERGENCY ROOM  [address] [id number] [phone]  Dept: [phone]       Emigdio Rivera MD  01/19/25 9011

## 2025-01-20 VITALS
DIASTOLIC BLOOD PRESSURE: 79 MMHG | HEART RATE: 18 BPM | BODY MASS INDEX: 36.65 KG/M2 | SYSTOLIC BLOOD PRESSURE: 164 MMHG | WEIGHT: 220 LBS | HEIGHT: 65 IN | RESPIRATION RATE: 18 BRPM | OXYGEN SATURATION: 94 % | TEMPERATURE: 97.5 F

## 2025-01-20 LAB
ANION GAP SERPL CALCULATED.3IONS-SCNC: 12 MMOL/L (ref 7–15)
BUN SERPL-MCNC: 22.1 MG/DL (ref 8–23)
CALCIUM SERPL-MCNC: 8.8 MG/DL (ref 8.8–10.4)
CHLORIDE SERPL-SCNC: 103 MMOL/L (ref 98–107)
CREAT SERPL-MCNC: 0.74 MG/DL (ref 0.51–0.95)
EGFRCR SERPLBLD CKD-EPI 2021: 84 ML/MIN/1.73M2
ERYTHROCYTE [DISTWIDTH] IN BLOOD BY AUTOMATED COUNT: 14.2 % (ref 10–15)
GLUCOSE SERPL-MCNC: 93 MG/DL (ref 70–99)
HCO3 SERPL-SCNC: 23 MMOL/L (ref 22–29)
HCT VFR BLD AUTO: 40.3 % (ref 35–47)
HGB BLD-MCNC: 13.6 G/DL (ref 11.7–15.7)
MCH RBC QN AUTO: 33.5 PG (ref 26.5–33)
MCHC RBC AUTO-ENTMCNC: 33.7 G/DL (ref 31.5–36.5)
MCV RBC AUTO: 99 FL (ref 78–100)
PLATELET # BLD AUTO: 229 10E3/UL (ref 150–450)
POTASSIUM SERPL-SCNC: 3.9 MMOL/L (ref 3.4–5.3)
RBC # BLD AUTO: 4.06 10E6/UL (ref 3.8–5.2)
SODIUM SERPL-SCNC: 138 MMOL/L (ref 135–145)
WBC # BLD AUTO: 7.6 10E3/UL (ref 4–11)

## 2025-01-20 PROCEDURE — 36415 COLL VENOUS BLD VENIPUNCTURE: CPT | Performed by: HOSPITALIST

## 2025-01-20 PROCEDURE — 250N000013 HC RX MED GY IP 250 OP 250 PS 637: Performed by: INTERNAL MEDICINE

## 2025-01-20 PROCEDURE — 99239 HOSP IP/OBS DSCHRG MGMT >30: CPT | Performed by: FAMILY MEDICINE

## 2025-01-20 PROCEDURE — G0378 HOSPITAL OBSERVATION PER HR: HCPCS

## 2025-01-20 PROCEDURE — 80048 BASIC METABOLIC PNL TOTAL CA: CPT | Performed by: HOSPITALIST

## 2025-01-20 PROCEDURE — 85041 AUTOMATED RBC COUNT: CPT | Performed by: HOSPITALIST

## 2025-01-20 PROCEDURE — 250N000013 HC RX MED GY IP 250 OP 250 PS 637: Performed by: HOSPITALIST

## 2025-01-20 PROCEDURE — 85014 HEMATOCRIT: CPT | Performed by: HOSPITALIST

## 2025-01-20 RX ORDER — PREDNISONE 20 MG/1
20 TABLET ORAL DAILY
Qty: 7 TABLET | Refills: 0 | Status: SHIPPED | OUTPATIENT
Start: 2025-01-20 | End: 2025-01-27

## 2025-01-20 RX ORDER — NAPROXEN SODIUM 220 MG/1
220 TABLET, FILM COATED ORAL 2 TIMES DAILY PRN
COMMUNITY
Start: 2025-01-20

## 2025-01-20 RX ORDER — HYDROCHLOROTHIAZIDE 12.5 MG/1
12.5 CAPSULE ORAL DAILY PRN
Qty: 30 CAPSULE | Refills: 0 | Status: SHIPPED | OUTPATIENT
Start: 2025-01-20

## 2025-01-20 RX ORDER — HYDROCHLOROTHIAZIDE 12.5 MG/1
12.5 CAPSULE ORAL DAILY PRN
Status: DISCONTINUED | OUTPATIENT
Start: 2025-01-20 | End: 2025-01-20 | Stop reason: HOSPADM

## 2025-01-20 RX ADMIN — PANTOPRAZOLE SODIUM 40 MG: 40 TABLET, DELAYED RELEASE ORAL at 08:33

## 2025-01-20 RX ADMIN — UMECLIDINIUM BROMIDE AND VILANTEROL TRIFENATATE 1 PUFF: 62.5; 25 POWDER RESPIRATORY (INHALATION) at 08:27

## 2025-01-20 RX ADMIN — TRAMADOL HYDROCHLORIDE 25 MG: 50 TABLET, FILM COATED ORAL at 00:04

## 2025-01-20 RX ADMIN — ROSUVASTATIN 20 MG: 5 TABLET, FILM COATED ORAL at 08:28

## 2025-01-20 ASSESSMENT — ACTIVITIES OF DAILY LIVING (ADL)
ADLS_ACUITY_SCORE: 60
ADLS_ACUITY_SCORE: 58
ADLS_ACUITY_SCORE: 60
ADLS_ACUITY_SCORE: 60
ADLS_ACUITY_SCORE: 59

## 2025-01-20 NOTE — PROGRESS NOTES
Discharge paperwork discussed with pt. Questions were answered to patient satisfaction. Maisha Bhatti RN 1/20/2025 10:34 AM

## 2025-01-20 NOTE — ED NOTES
Ambulated patient to the restroom, SBA.   Patient was on oxygen prior to getting up at 95 % at 1Ls  Resting without oxygen patient was at 92%    Patient ambulated to the restroom on room air without walker or cane, and dropped to 82%. Patient was placed back on oxygen at 1Ls, and up to 96%. Patient is in the chair eating breakfast, RN was made aware.

## 2025-01-20 NOTE — DISCHARGE SUMMARY
"[facility]  Hospitalist Discharge Summary      Date of Admission:  1/19/2025  Date of Discharge:  1/20/2025 10:34 AM  Discharging Provider: Fabiano Maynard MD  Discharge Service: Hospitalist Service    Discharge Diagnoses   Chest Pain and COPD exacerbation    Clinically Significant Risk Factors     # Obesity: Estimated body mass index is 36.61 kg/m  as calculated from the following:    Height as of this encounter: 1.651 m (5' 5\").    Weight as of this encounter: 99.8 kg (220 lb).       Follow-ups Needed After Discharge   Follow-up Appointments       Follow-up and recommended labs and tests       Follow up with primary care provider, Sally Coulter, within 7 days for hospital follow- up.  The following labs/tests are recommended: Recheck Oxygen Levels.                Unresulted Labs Ordered in the Past 30 Days of this Admission       No orders found from 12/20/2024 to 1/20/2025.        These results will be followed up by n/a.    Discharge Disposition   Discharged to home  Condition at discharge: Stable    Hospital Course      Lori Liu is a 74 year old female presenting with a chief complaint of chest pain and dyspnea.  She is clinically stable, on supplemental oxygen.  Etiology of chest pain and dyspnea is not clear.  She has features which are reassuring against ACS, namely production of pain to palpation on exam, temporal relationship to injury, and lack of troponin elevation.  The positional nature of pain raises concern for pericarditis.  However, she had a TTE four days ago which did not demonstrate pericarditis.  The CT chest was otherwise unrevealing for an acute infectious process.  Crackles on exam could be consistent with atelectasis. Plan to monitor clinically for evolution/recurrence of chest pain, continue on telemetry.    During her hospitalization in Aug 2024, she developed pulmonary emboli despite being on therapeutic anticoagulation.  Unclear if this " occurred due to patient holding anticoagulation for her planned surgery at that time.      # Acute hypoxic respiratory failure  Resolved with steroid and breathing treatment.  Will continue steroid PO.  Take with food considering possible esophagitis on imaging.    # Chest pain - aleve prn for chest wall pain recurrence  - could also be esophagitis - continue BID PPI  - No evidence of cardiac cause of pain    #HTN - had some very high blood pressures  - ordered hydrochlorothiazide PRN.  - Should recheck at follow up.    # Hx VTE  - rivaroxaban    # GERD    # SHASHI  - CPAP    # Outpatient followup  - esophagitis on CT  - pulmonary fibrosis on CT  - possible pulmonary hypertension    Consultations This Hospital Stay   None    Code Status   Prior    Time Spent on this Encounter   I, Fabiano Maynard MD, personally saw the patient today and spent greater than 30 minutes discharging this patient.       Fabiano Maynard MD  [facility] EMERGENCY ROOM  [address] [address]  Phone: [phone]  Fax: [phone]  ______________________________________________________________________    Physical Exam   Vital Signs: Temp: 97.5  F (36.4  C) Temp src: Oral   Pulse: (!) 18   Resp: 18 SpO2: 94 % O2 Device: None (Room air) Oxygen Delivery: 2 LPM  Weight: 220 lbs 0 oz  Constitutional: awake, alert, cooperative, no apparent distress, and appears stated age  Respiratory: no increased work of breathing and no crackles or wheezing  Cardiovascular: normal S1 and S2  GI: normal bowel sounds  Musculoskeletal:chest wall non-tender, no bruising  Neurologic: Awake, alert, oriented to name, place and time.  Cranial nerves II-XII are grossly intact.  No motor deficits       Primary Care Physician   Sally Coulter    Discharge Orders      Reason for your hospital stay    Hypoxia and Chest Pain     Follow-up and recommended labs and tests     Follow up with primary care provider,  Sally Coulter, within 7 days for hospital follow- up.  The following labs/tests are recommended: Recheck Oxygen Levels.     Activity    Your activity upon discharge: activity as tolerated     Diet    Follow this diet upon discharge: Current Diet:Orders Placed This Encounter      Regular Diet Adult       Significant Results and Procedures   Results for orders placed or performed during the hospital encounter of 01/19/25   CT Chest Pulmonary Embolism w Contrast    Narrative    EXAM: CT CHEST PULMONARY EMBOLISM W CONTRAST  LOCATION: [facility]  DATE: 1/19/2025    INDICATION: pleuritic sscp and sob with low sats today. h  o PE  COMPARISON: 1/15/2025  TECHNIQUE: CT chest pulmonary angiogram during arterial phase injection of IV contrast. Multiplanar reformats and MIP reconstructions were performed. Dose reduction techniques were used.   CONTRAST: 75ml Isovue 370    FINDINGS:  ANGIOGRAM CHEST: The main pulmonary artery is again dilated. No pulmonary embolism. Thoracic aorta is negative for dissection.    LUNGS AND PLEURA: Similar mild peripheral fibrosis. No pleural effusions.    MEDIASTINUM/AXILLAE: No thoracic aortic aneurysm. No lymphadenopathy. Similar mild distal esophageal wall thickening. There is again right ventricular enlargement.    CORONARY ARTERY CALCIFICATION: Mild.    UPPER ABDOMEN: Cholecystectomy.    MUSCULOSKELETAL: Unremarkable      Impression    IMPRESSION:  1.  No pulmonary embolism.   US Lower Extremity Venous Duplex Left    Narrative    EXAM: ULTRASOUND LOWER EXTREMITY VENOUS DUPLEX LEFT  LOCATION: [facility]  DATE: 01/19/2025    INDICATION: Recent fall and leg trauma. Has bruising and calf pain. History of DVT, PE.  COMPARISON: None.  TECHNIQUE: Venous Duplex ultrasound of the left lower extremity with and without compression, augmentation and duplex. Color flow and spectral Doppler with waveform analysis performed.    FINDINGS: Exam includes  the common femoral, femoral, popliteal, and contralateral common femoral veins as well as segmentally visualized deep calf veins and greater saphenous vein.     LEFT: No deep vein thrombosis. No superficial thrombophlebitis. Complex Rodriguez's cyst measuring 7.8 x 1.2 x 4.3 cm. Probable hematoma in the area of bruising measuring 3.1 x 1.2 x 2.1 cm. For comparison the right common femoral vein was evaluated and was   unremarkable.      Impression    IMPRESSION:  1.  No deep venous thrombosis in the left lower extremity.         Discharge Medications   Discharge Medication List as of 1/20/2025 10:11 AM        START taking these medications    Details   hydrochlorothiazide (MICROZIDE) 12.5 MG capsule Take 1 capsule (12.5 mg) by mouth daily as needed (for high blood pressure.  SBP >170 or DBP >90)., Disp-30 capsule, R-0, E-Prescribe      naproxen sodium (ANAPROX) 220 MG tablet Take 1 tablet (220 mg) by mouth 2 times daily as needed for moderate pain (Take for recurrent positional chest pain.  Take with food.)., OTC      predniSONE (DELTASONE) 20 MG tablet Take 1 tablet (20 mg) by mouth daily for 7 days., Disp-7 tablet, R-0, E-Prescribe           CONTINUE these medications which have NOT CHANGED    Details   albuterol (PROAIR HFA/PROVENTIL HFA/VENTOLIN HFA) 108 (90 Base) MCG/ACT inhaler Inhale 2 puffs into the lungs every 4 hours as needed for shortness of breath or wheezing., Disp-18 g, R-0, E-Prescribe      carboxymethylcellulose PF (REFRESH PLUS) 0.5 % ophthalmic solution Place 1 drop into both eyes 3 times daily as needed for dry eyes., Historical      cyanocobalamin (VITAMIN B-12) 500 MCG tablet Take 1,000 mcg by mouth daily, Historical      omeprazole (PRILOSEC) 20 MG DR capsule Take 20 mg by mouth 2 times daily., Historical      rivaroxaban ANTICOAGULANT (XARELTO ANTICOAGULANT) 20 MG TABS tablet Take 1 tablet (20 mg) by mouth daily (with dinner)., Disp-90 tablet, R-0, E-Prescribe      rosuvastatin (CRESTOR) 20 MG  tablet Take 20 mg by mouth daily, Historical      senna-docusate (SENOKOT-S/PERICOLACE) 8.6-50 MG tablet Take 1 tablet by mouth daily., Historical      traMADol (ULTRAM) 50 MG tablet Take 50 mg by mouth every 6 hours as needed for severe pain., Historical      umeclidinium-vilanterol (ANORO ELLIPTA) 62.5-25 MCG/ACT oral inhaler Inhale 1 puff into the lungs every morning., Historical      VITRON-C  MG TABS tablet Take 1 tablet by mouth daily, NEGRO, Historical      spacer (OPTICHAMBER GONZALEZ) holding chamber Use with inhaler, Disp-1 each, R-0, E-Prescribe           STOP taking these medications       methylPREDNISolone (MEDROL DOSEPAK) 4 MG tablet therapy pack Comments:   Reason for Stopping:             Allergies   Allergies   Allergen Reactions    Cephalexin Itching and Rash    Penicillins Hives, Itching and Rash

## 2025-01-20 NOTE — PLAN OF CARE
"Goal Outcome Evaluation:       PRIMARY DIAGNOSIS: \"GENERIC\" NURSING  OUTPATIENT/OBSERVATION GOALS TO BE MET BEFORE DISCHARGE:  ADLs back to baseline: No    Activity and level of assistance: Up with standby assistance.    Pain status: Improved-controlled with oral pain medications.    Return to near baseline physical activity: No     Discharge Planner Nurse   Safe discharge environment identified: Yes  Barriers to discharge: Yes       Entered by: Chelsea Aguirre RN 01/20/2025 6:09 AM   Weak and painful with movement. HTN of 170's persists but otherwise VSS. Up with one person assistance, complains of some pain (between 1-5) in her chest/right underarm that she states feels muscular, PRN tramadol given (half tablet) and one one time dose of half tablet utilized.    Please review provider order for any additional goals.   Nurse to notify provider when observation goals have been met and patient is ready for discharge.                 Problem: Adult Inpatient Plan of Care  Goal: Readiness for Transition of Care  Outcome: Not Progressing     "

## 2025-01-20 NOTE — PROGRESS NOTES
Patient seen and examined.  Will eval for discharge on home O2 this morning.  Will need on-going PO steroids and close follow up but can discharge with or without oxygen.  Full note to follow.

## 2025-01-22 ENCOUNTER — PATIENT OUTREACH (OUTPATIENT)
Dept: CARE COORDINATION | Facility: CLINIC | Age: 75
End: 2025-01-22
Payer: COMMERCIAL

## 2025-01-22 ENCOUNTER — MEDICAL CORRESPONDENCE (OUTPATIENT)
Dept: HEALTH INFORMATION MANAGEMENT | Facility: CLINIC | Age: 75
End: 2025-01-22
Payer: COMMERCIAL

## 2025-01-22 NOTE — PROGRESS NOTES
Connected Care Resource Center:   [facility] Resource Center Contact  [facility]/Voicemail     Clinical Data: Post-Discharge Outreach     Outreach attempted x 2.  Left message on patient's voicemail, providing [facility]'s central phone number of [phone] ([phone]) for questions/concerns and/or to schedule an appt with an [facility] provider, if they do not have a PCP.      Plan:  [facility] will do no further outreaches at this time.       Lacey Meredith MA  Connected Care Resource Center, [facility]    *Connected Care Resource Team does NOT follow patient ongoing. Referrals are identified based on internal discharge reports and the outreach is to ensure patient has an understanding of their discharge instructions.

## 2025-02-05 ENCOUNTER — PATIENT OUTREACH (OUTPATIENT)
Dept: CARE COORDINATION | Facility: CLINIC | Age: 75
End: 2025-02-05
Payer: COMMERCIAL

## 2025-02-19 ENCOUNTER — OFFICE VISIT (OUTPATIENT)
Dept: PULMONOLOGY | Facility: CLINIC | Age: 75
End: 2025-02-19
Payer: COMMERCIAL

## 2025-02-19 VITALS
OXYGEN SATURATION: 95 % | HEIGHT: 63 IN | WEIGHT: 221 LBS | DIASTOLIC BLOOD PRESSURE: 62 MMHG | HEART RATE: 72 BPM | BODY MASS INDEX: 39.16 KG/M2 | SYSTOLIC BLOOD PRESSURE: 134 MMHG

## 2025-02-19 DIAGNOSIS — J84.9 INTERSTITIAL LUNG DISEASE (H): ICD-10-CM

## 2025-02-19 DIAGNOSIS — I27.20 PULMONARY HYPERTENSION (H): ICD-10-CM

## 2025-02-19 DIAGNOSIS — J84.9 INTERSTITIAL LUNG DISEASE (H): Primary | ICD-10-CM

## 2025-02-19 DIAGNOSIS — J84.112 IPF (IDIOPATHIC PULMONARY FIBROSIS) (H): ICD-10-CM

## 2025-02-19 PROBLEM — R06.00 DYSPNEA: Status: RESOLVED | Noted: 2021-10-20 | Resolved: 2025-02-19

## 2025-02-19 PROBLEM — R07.89 CHEST HEAVINESS: Status: RESOLVED | Noted: 2023-12-15 | Resolved: 2025-02-19

## 2025-02-19 PROBLEM — R09.02 HYPOXIA: Status: RESOLVED | Noted: 2021-10-19 | Resolved: 2025-02-19

## 2025-02-19 PROBLEM — K44.9 HIATAL HERNIA: Status: RESOLVED | Noted: 2023-12-15 | Resolved: 2025-02-19

## 2025-02-19 PROBLEM — R07.9 ACUTE CHEST PAIN: Status: RESOLVED | Noted: 2025-01-19 | Resolved: 2025-02-19

## 2025-02-19 PROCEDURE — 3078F DIAST BP <80 MM HG: CPT | Performed by: INTERNAL MEDICINE

## 2025-02-19 PROCEDURE — 3075F SYST BP GE 130 - 139MM HG: CPT | Performed by: INTERNAL MEDICINE

## 2025-02-19 PROCEDURE — 99213 OFFICE O/P EST LOW 20 MIN: CPT | Performed by: INTERNAL MEDICINE

## 2025-02-19 NOTE — PROGRESS NOTES
Pulmonary Clinic Consultation    Assessment:  73yoF with pulmonary fibrosis I believe secondary to hiatal hernia and reflux but potentially IPF as well based on honeycombing.         Plan:   I am concerned this may have contributed to her fibrosis and should be addressed.  Annual PFTs and CT chest  Not currently in need of O2  Would consider pirfenidone or an antifibrotic should this not be related to GERD.   Echo has found no pulmonary hypertension.   Stop Anoro, not sure what role it is playing but no diagnosis of COPD    Yumiko Cooper MD  Pulmonary/Critical Care        ---------------------------------    Reason for Consult: pulmonary fibrosis    HPI: 73yoF with history of PE in 2021 on DOAC, morbid obesity with IPF. She also has a hiatal hernia    Since I saw her last, had hiatal hernia repair. This went well and she thinks her breathing is slightly improved since then.     Fall in January with trauma to anterior chest causing significant pain with breathing and movement. Then repsented to ED with this and was found to be hypoxic, but this improved. Was treated with prednisone taper (unclear why). Frustrated that providers continued to label her as COPD    CPAP machine at home. SHASHI diagnosed 2022.     Remains on anticoagulation life long. No significant wheezing, but coughing and shortness of breath are the main issues.     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 Denies occupational exposure.     ROS:  A 12-system review was notable for dyspnea, easy bleeding due to DOAC. The remainder reviewed and negative.     PMH:  Past Medical History:   Diagnosis Date    Anemia, iron deficiency     Antiplatelet or antithrombotic long-term use     Gastroesophageal reflux disease     Gastroesophageal reflux disease with esophagitis without hemorrhage     Mixed hyperlipidemia     SHASHI (obstructive sleep apnea)     Osteoarthritis of both knees     Pulmonary  embolism (H)     on lifelong anticoagulation    Thrombosis        PSH:  Social History     Tobacco Use    Smoking status: Former     Types: Cigarettes    Smokeless tobacco: Never   Vaping Use    Vaping status: Never Used   Substance Use Topics    Alcohol use: Never    Drug use: Never         Family HX:  Family History   Problem Relation Age of Onset    Colon Cancer Mother     Clotting Disorder Father         Does not recall exact issue with bleeding vs clotting disorder    Lymphoma Sister     LUNG DISEASE No family hx of        Allergies:  Allergies   Allergen Reactions    Cephalexin Itching and Rash    Penicillins Hives, Itching and Rash       Current Meds:  Current Outpatient Medications   Medication Sig Dispense Refill    albuterol (PROAIR HFA/PROVENTIL HFA/VENTOLIN HFA) 108 (90 Base) MCG/ACT inhaler Inhale 2 puffs into the lungs every 4 hours as needed for shortness of breath or wheezing. 18 g 0    carboxymethylcellulose PF (REFRESH PLUS) 0.5 % ophthalmic solution Place 1 drop into both eyes 3 times daily as needed for dry eyes.      cyanocobalamin (VITAMIN B-12) 500 MCG tablet Take 1,000 mcg by mouth daily      hydrochlorothiazide (MICROZIDE) 12.5 MG capsule Take 1 capsule (12.5 mg) by mouth daily as needed (for high blood pressure.  SBP >170 or DBP >90). 30 capsule 0    naproxen sodium (ANAPROX) 220 MG tablet Take 1 tablet (220 mg) by mouth 2 times daily as needed for moderate pain (Take for recurrent positional chest pain.  Take with food.).      omeprazole (PRILOSEC) 20 MG DR capsule Take 20 mg by mouth 2 times daily.      rivaroxaban ANTICOAGULANT (XARELTO ANTICOAGULANT) 20 MG TABS tablet Take 1 tablet (20 mg) by mouth daily (with dinner). 90 tablet 0    rosuvastatin (CRESTOR) 20 MG tablet Take 20 mg by mouth daily      senna-docusate (SENOKOT-S/PERICOLACE) 8.6-50 MG tablet Take 1 tablet by mouth daily.      spacer (OPTICHAMBER GONZALEZ) holding chamber Use with inhaler 1 each 0    traMADol (ULTRAM) 50 MG  tablet Take 50 mg by mouth every 6 hours as needed for severe pain.      umeclidinium-vilanterol (ANORO ELLIPTA) 62.5-25 MCG/ACT oral inhaler Inhale 1 puff into the lungs every morning.      VITRON-C  MG TABS tablet Take 1 tablet by mouth daily       No current facility-administered medications for this visit.         Physical Exam:  There were no vitals taken for this visit.  Gen: alert, oriented, no distress  HEENT: anicteric sclera, mask in place.  Neck: trachea midline, no cervical or supraclavicular lymphadenopathy  CV: Regular rate and rhythm, normal S1 and S2.   Resp: Clear bilaterally with good air entry.   Abd: soft, nontender  Skin: no visible rashes or lesions.   Ext: no cyanosis, clubbing or edema  Neuro: alert, nonfocal, grossly normal.     Labs:  Recent Labs   Lab Test 04/01/24  0950 07/28/22  1447 10/20/21  0641   WBC  --   --  6.1   RBC  --   --  3.52*   HGB 13.9   < > 10.6*   HCT  --   --  34.2*   MCV  --   --  97   MCH  --   --  30.1   MCHC  --   --  31.0*   RDW  --   --  17.8*   PLT  --   --  261    < > = values in this interval not displayed.     Results for orders placed or performed during the hospital encounter of 01/19/25   Basic metabolic panel    Collection Time: 01/20/25  6:39 AM   Result Value Ref Range    Sodium 138 135 - 145 mmol/L    Potassium 3.9 3.4 - 5.3 mmol/L    Chloride 103 98 - 107 mmol/L    Carbon Dioxide (CO2) 23 22 - 29 mmol/L    Anion Gap 12 7 - 15 mmol/L    Urea Nitrogen 22.1 8.0 - 23.0 mg/dL    Creatinine 0.74 0.51 - 0.95 mg/dL    GFR Estimate 84 >60 mL/min/1.73m2    Calcium 8.8 8.8 - 10.4 mg/dL    Glucose 93 70 - 99 mg/dL           Imaging studies:  Personally reviewed image/s and Personally reviewed impression/s  EXAM: CT CHEST W/O CONTRAST  LOCATION: [facility]  DATE: 6/17/2024     INDICATION:  Interstitial pulmonary disease (H)  COMPARISON: Cardiac CT 1/2/2024 and CT pulmonary angiogram 10/19/2021  TECHNIQUE: CT chest without IV  contrast. Multiplanar reformats were obtained. Dose reduction techniques were used.  CONTRAST: None.     FINDINGS:   LUNGS AND PLEURA: Findings of a fibrosing lung disorder are present characterized by heterogeneous distribution peripheral/subpleural predominant fibrosis. Within the territories of fibrosis there is traction bronchiectasis and bronchiolectasis. In a few   locations in the basilar lungs there are stacked subpleural cysts consistent with radiologic honeycombing well the prior CT pulmonary angiogram is not fully inspiratory comparing the expiratory series there is mild progression of fibrosis from 2019. No   superimposed consolidative airspace opacities or solid nodules. Trachea and central airways are patent. The expiratory images show a few areas of lucency in the left lower lobe, however air trapping is not a conspicuous feature. No pleural space   abnormality.     MEDIASTINUM: Cardiac chambers are not enlarged. No pericardial effusion. The main pulmonary artery is enlarged measuring 3.8 cm in diameter which is associated with pulmonary hypertension. Nonaneurysmal thoracic aorta. Mild arch and descending aorta   atheromatous calcifications. Large hiatal hernia with over one half of the stomach intrathoracic. Foreshortening of the esophagus. No esophageal wall thickening. Hilar and mediastinal lymph nodes are normal in size and morphology. The imaged thyroid   gland is normal.     CORONARY ARTERY CALCIFICATION: None.     UPPER ABDOMEN: Surgical clips in the gallbladder fossa. There are no actionable findings in the imaged upper abdomen.     MUSCULOSKELETAL: Disc space narrowing and small marginal osteophytes throughout the thoracic spine.                                                                      IMPRESSION:      1.  Findings of a diffuse fibrosing lung disorder are present. Pattern is consistent with typical UIP and there is mild progression of fibrosis compared to 2021. Leading  differential consideration is UIP/IPF. Fibrosing interstitial lung disease   associated with connective tissue disease and fibrosing hypersensitivity pneumonitis can also produce this pattern.  2.  Large hiatal hernia.  3.  Enlarged main pulmonary artery which is associated with pulmonary hypertension and could relate to restrictive lung disease (group 3).      PFT's   Personally reviewed with patient.     Mild restriction with moderate diffusion capacity deficit.

## 2025-02-19 NOTE — PATIENT INSTRUCTIONS
Please get lab work completed  I will be in touch with initiation of medications.   [facility] nurse [phone]

## 2025-02-20 DIAGNOSIS — R06.02 SOB (SHORTNESS OF BREATH): ICD-10-CM

## 2025-02-20 DIAGNOSIS — Z09 ENCOUNTER FOR FOLLOW-UP EXAMINATION AFTER COMPLETED TREATMENT FOR CONDITIONS OTHER THAN MALIGNANT NEOPLASM: ICD-10-CM

## 2025-02-20 DIAGNOSIS — D64.9 ANEMIA, UNSPECIFIED: ICD-10-CM

## 2025-02-20 DIAGNOSIS — I27.20 PULMONARY HYPERTENSION (H): Primary | ICD-10-CM

## 2025-02-20 DIAGNOSIS — Z11.59 ENCOUNTER FOR SCREENING FOR OTHER VIRAL DISEASES: ICD-10-CM

## 2025-02-20 LAB
DLCOCOR-%PRED-PRE: 57 %
DLCOCOR-PRE: 10.46 ML/MIN/MMHG
DLCOUNC-%PRED-PRE: 58 %
DLCOUNC-PRE: 10.53 ML/MIN/MMHG
DLCOUNC-PRED: 18.15 ML/MIN/MMHG
ERV-%PRED-PRE: 49 %
ERV-PRE: 0.44 L
ERV-PRED: 0.88 L
EXPTIME-PRE: 4.13 SEC
FEF2575-%PRED-PRE: 166 %
FEF2575-PRE: 2.76 L/SEC
FEF2575-PRED: 1.66 L/SEC
FEFMAX-%PRED-PRE: 105 %
FEFMAX-PRE: 5.35 L/SEC
FEFMAX-PRED: 5.07 L/SEC
FEV1-%PRED-PRE: 92 %
FEV1-PRE: 1.85 L
FEV1FEV6-PRE: 88 %
FEV1FEV6-PRED: 78 %
FEV1FVC-PRE: 87 %
FEV1FVC-PRED: 78 %
FEV1SVC-PRE: 87 %
FEV1SVC-PRED: 68 %
FIFMAX-PRE: 4.6 L/SEC
FRCPLETH-%PRED-PRE: 55 %
FRCPLETH-PRE: 1.57 L
FRCPLETH-PRED: 2.81 L
FVC-%PRED-PRE: 81 %
FVC-PRE: 2.12 L
FVC-PRED: 2.59 L
IC-%PRED-PRE: 95 %
IC-PRE: 1.68 L
IC-PRED: 1.77 L
RVPLETH-%PRED-PRE: 53 %
RVPLETH-PRE: 1.13 L
RVPLETH-PRED: 2.1 L
TLCPLETH-%PRED-PRE: 68 %
TLCPLETH-PRE: 3.25 L
TLCPLETH-PRED: 4.77 L
VA-%PRED-PRE: 67 %
VA-PRE: 2.95 L
VC-%PRED-PRE: 72 %
VC-PRE: 2.12 L
VC-PRED: 2.92 L

## 2025-02-20 NOTE — PROGRESS NOTES
Maricarmen Hernadez MD Thiery, Bryan, GABRIELLA; Rocio Maldonado  She is being referred by her pulmonologist as she has ILD and the PFT's showed a low DLCO (which can happen when patients develop PH).    Ok for keshawn. Please schedule her for all our routing PH testing including labs, 6MWT, repeat echo with bubble study (as it has been a year since her last echo), and V/Q scan    Thank you team    TT

## 2025-03-10 NOTE — TELEPHONE ENCOUNTER
RECORDS RECEIVED FROM:    DATE RECEIVED:    GENERAL RECORDS STATUS DETAILS   OFFICE NOTE from cardiologists Internal 12-15-23 Beto Johnson   EKG (STRIPS & REPORTS) Internal 1-15-25   ECHOS (IMAGES AND REPORTS) Internal 1-15-24   PULMONARY HYPERTENSION      6 MINUTE WALK TEST N/A    PULMONARY FUNCTION TESTS Internal 2-19-25   RIGHT HEART CATH (IMAGES) N/A    SLEEP STUDY / OVERNIGHT OXIMETRY N/A    XR CHEST   (IMAGES AND REPORTS) Internal 12-2-24   CHEST CT  (IMAGES AND REPORTS) Internal 1-19-25   V/Q SCAN (IMAGES) N/A    LIVER US  (IMAGES AND REPORTS) N/A    ANGIOGRAMS (IMAGES) Internal CTA Coronary 1-2-24   STRESS TEST   (IMAGES AND REPORTS) N/A

## 2025-03-15 ENCOUNTER — HEALTH MAINTENANCE LETTER (OUTPATIENT)
Age: 75
End: 2025-03-15

## 2025-03-20 ENCOUNTER — PRE VISIT (OUTPATIENT)
Dept: CARDIOLOGY | Facility: CLINIC | Age: 75
End: 2025-03-20
Payer: COMMERCIAL

## 2025-04-01 ENCOUNTER — ANCILLARY PROCEDURE (OUTPATIENT)
Dept: BONE DENSITY | Facility: CLINIC | Age: 75
End: 2025-04-01
Attending: PHYSICIAN ASSISTANT
Payer: COMMERCIAL

## 2025-04-01 DIAGNOSIS — Z78.0 ASYMPTOMATIC MENOPAUSAL STATE: ICD-10-CM

## 2025-04-01 PROCEDURE — 77091 TBS TECHL CALCULATION ONLY: CPT | Performed by: PHYSICIAN ASSISTANT

## 2025-04-01 PROCEDURE — 77080 DXA BONE DENSITY AXIAL: CPT | Mod: TC | Performed by: PHYSICIAN ASSISTANT

## 2025-04-28 ENCOUNTER — HOSPITAL ENCOUNTER (EMERGENCY)
Facility: CLINIC | Age: 75
Discharge: HOME | End: 2025-04-28
Attending: EMERGENCY MEDICINE | Admitting: EMERGENCY MEDICINE
Payer: COMMERCIAL

## 2025-04-28 ENCOUNTER — HOSPITAL ENCOUNTER (OUTPATIENT)
Dept: CARDIOLOGY | Facility: CLINIC | Age: 75
Discharge: HOME | End: 2025-04-28
Attending: INTERNAL MEDICINE | Admitting: INTERNAL MEDICINE
Payer: COMMERCIAL

## 2025-04-28 VITALS
WEIGHT: 229.4 LBS | SYSTOLIC BLOOD PRESSURE: 154 MMHG | DIASTOLIC BLOOD PRESSURE: 75 MMHG | OXYGEN SATURATION: 96 % | RESPIRATION RATE: 23 BRPM | HEIGHT: 65 IN | BODY MASS INDEX: 38.22 KG/M2 | HEART RATE: 68 BPM | TEMPERATURE: 97.7 F

## 2025-04-28 DIAGNOSIS — I27.20 PULMONARY HYPERTENSION (H): ICD-10-CM

## 2025-04-28 DIAGNOSIS — R06.02 SOB (SHORTNESS OF BREATH): ICD-10-CM

## 2025-04-28 DIAGNOSIS — I49.8 SINUS ARRHYTHMIA SEEN ON ELECTROCARDIOGRAM: ICD-10-CM

## 2025-04-28 LAB
ALBUMIN SERPL BCG-MCNC: 3.8 G/DL (ref 3.5–5.2)
ALP SERPL-CCNC: 49 U/L (ref 40–150)
ALT SERPL W P-5'-P-CCNC: 9 U/L (ref 0–50)
ANION GAP SERPL CALCULATED.3IONS-SCNC: 8 MMOL/L (ref 7–15)
AST SERPL W P-5'-P-CCNC: 20 U/L (ref 0–45)
ATRIAL RATE - MUSE: 78 BPM
BASOPHILS # BLD AUTO: 0 10E3/UL (ref 0–0.2)
BASOPHILS NFR BLD AUTO: 0 %
BILIRUB DIRECT SERPL-MCNC: 0.28 MG/DL (ref 0–0.3)
BILIRUB SERPL-MCNC: 0.6 MG/DL
BUN SERPL-MCNC: 10.6 MG/DL (ref 8–23)
CALCIUM SERPL-MCNC: 9.3 MG/DL (ref 8.8–10.4)
CHLORIDE SERPL-SCNC: 107 MMOL/L (ref 98–107)
CREAT SERPL-MCNC: 0.79 MG/DL (ref 0.51–0.95)
DIASTOLIC BLOOD PRESSURE - MUSE: NORMAL MMHG
EGFRCR SERPLBLD CKD-EPI 2021: 78 ML/MIN/1.73M2
EOSINOPHIL # BLD AUTO: 0.1 10E3/UL (ref 0–0.7)
EOSINOPHIL NFR BLD AUTO: 2 %
ERYTHROCYTE [DISTWIDTH] IN BLOOD BY AUTOMATED COUNT: 13.6 % (ref 10–15)
GLUCOSE SERPL-MCNC: 98 MG/DL (ref 70–99)
HCO3 SERPL-SCNC: 27 MMOL/L (ref 22–29)
HCT VFR BLD AUTO: 43.5 % (ref 35–47)
HGB BLD-MCNC: 14.6 G/DL (ref 11.7–15.7)
IMM GRANULOCYTES # BLD: 0 10E3/UL
IMM GRANULOCYTES NFR BLD: 0 %
INTERPRETATION ECG - MUSE: NORMAL
LIPASE SERPL-CCNC: 25 U/L (ref 13–60)
LVEF ECHO: NORMAL
LYMPHOCYTES # BLD AUTO: 1.1 10E3/UL (ref 0.8–5.3)
LYMPHOCYTES NFR BLD AUTO: 15 %
MAGNESIUM SERPL-MCNC: 2.1 MG/DL (ref 1.7–2.3)
MCH RBC QN AUTO: 33.4 PG (ref 26.5–33)
MCHC RBC AUTO-ENTMCNC: 33.6 G/DL (ref 31.5–36.5)
MCV RBC AUTO: 100 FL (ref 78–100)
MONOCYTES # BLD AUTO: 0.5 10E3/UL (ref 0–1.3)
MONOCYTES NFR BLD AUTO: 8 %
NEUTROPHILS # BLD AUTO: 5.1 10E3/UL (ref 1.6–8.3)
NEUTROPHILS NFR BLD AUTO: 75 %
NRBC # BLD AUTO: 0 10E3/UL
NRBC BLD AUTO-RTO: 0 /100
NT-PROBNP SERPL-MCNC: 863 PG/ML (ref 0–900)
P AXIS - MUSE: 31 DEGREES
PLATELET # BLD AUTO: 219 10E3/UL (ref 150–450)
POTASSIUM SERPL-SCNC: 3.9 MMOL/L (ref 3.4–5.3)
PR INTERVAL - MUSE: 176 MS
PROT SERPL-MCNC: 6.5 G/DL (ref 6.4–8.3)
QRS DURATION - MUSE: 88 MS
QT - MUSE: 392 MS
QTC - MUSE: 446 MS
R AXIS - MUSE: -53 DEGREES
RBC # BLD AUTO: 4.37 10E6/UL (ref 3.8–5.2)
SODIUM SERPL-SCNC: 142 MMOL/L (ref 135–145)
SYSTOLIC BLOOD PRESSURE - MUSE: NORMAL MMHG
T AXIS - MUSE: 20 DEGREES
TSH SERPL DL<=0.005 MIU/L-ACNC: 0.83 UIU/ML (ref 0.3–4.2)
VENTRICULAR RATE- MUSE: 78 BPM
WBC # BLD AUTO: 6.8 10E3/UL (ref 4–11)

## 2025-04-28 PROCEDURE — 93005 ELECTROCARDIOGRAM TRACING: CPT | Performed by: EMERGENCY MEDICINE

## 2025-04-28 PROCEDURE — 36415 COLL VENOUS BLD VENIPUNCTURE: CPT | Performed by: EMERGENCY MEDICINE

## 2025-04-28 PROCEDURE — 84443 ASSAY THYROID STIM HORMONE: CPT | Performed by: EMERGENCY MEDICINE

## 2025-04-28 PROCEDURE — 93306 TTE W/DOPPLER COMPLETE: CPT | Mod: 26 | Performed by: INTERNAL MEDICINE

## 2025-04-28 PROCEDURE — 80053 COMPREHEN METABOLIC PANEL: CPT | Performed by: EMERGENCY MEDICINE

## 2025-04-28 PROCEDURE — 83735 ASSAY OF MAGNESIUM: CPT | Performed by: EMERGENCY MEDICINE

## 2025-04-28 PROCEDURE — 82248 BILIRUBIN DIRECT: CPT | Performed by: EMERGENCY MEDICINE

## 2025-04-28 PROCEDURE — 83880 ASSAY OF NATRIURETIC PEPTIDE: CPT | Performed by: EMERGENCY MEDICINE

## 2025-04-28 PROCEDURE — 83690 ASSAY OF LIPASE: CPT | Performed by: EMERGENCY MEDICINE

## 2025-04-28 PROCEDURE — 85004 AUTOMATED DIFF WBC COUNT: CPT | Performed by: EMERGENCY MEDICINE

## 2025-04-28 PROCEDURE — 99284 EMERGENCY DEPT VISIT MOD MDM: CPT | Performed by: EMERGENCY MEDICINE

## 2025-04-28 PROCEDURE — 93306 TTE W/DOPPLER COMPLETE: CPT

## 2025-04-28 ASSESSMENT — ACTIVITIES OF DAILY LIVING (ADL)
ADLS_ACUITY_SCORE: 56
ADLS_ACUITY_SCORE: 56

## 2025-04-28 ASSESSMENT — COLUMBIA-SUICIDE SEVERITY RATING SCALE - C-SSRS
1. IN THE PAST MONTH, HAVE YOU WISHED YOU WERE DEAD OR WISHED YOU COULD GO TO SLEEP AND NOT WAKE UP?: NO
6. HAVE YOU EVER DONE ANYTHING, STARTED TO DO ANYTHING, OR PREPARED TO DO ANYTHING TO END YOUR LIFE?: NO
2. HAVE YOU ACTUALLY HAD ANY THOUGHTS OF KILLING YOURSELF IN THE PAST MONTH?: NO

## 2025-04-28 NOTE — ED TRIAGE NOTES
Patient was upstairs having an echo exam and sent here for new onset a fib, she has chronic cough and SOB from lung issue, she states her shortness of breath has been increasing.      Triage Assessment (Adult)       Row Name 04/28/25 1010          Triage Assessment    Airway WDL WDL        Respiratory WDL    Respiratory WDL WDL        Skin Circulation/Temperature WDL    Skin Circulation/Temperature WDL WDL        Cardiac WDL    Cardiac Rhythm Atrial fibrillation        Peripheral/Neurovascular WDL    Peripheral Neurovascular WDL WDL        Cognitive/Neuro/Behavioral WDL    Cognitive/Neuro/Behavioral WDL WDL

## 2025-04-28 NOTE — DISCHARGE INSTRUCTIONS
"\"Sinus arrhythmia\" is part of the normal spectrum of heart beating. This is not dangerous and would not cause any symptoms.    Continue all of your previously-prescribed medications and follow up as previously-scheduled.    Return to the Emergency Department for any difficulty breathing, persistent vomiting, new or worsening symptoms, or any other concerns.  "

## 2025-04-28 NOTE — ED PROVIDER NOTES
EMERGENCY DEPARTMENT ENCOUNTER      NAME: Lori Liu  AGE: 74 year old female  YOB: 1950  MRN: [id number]  EVALUATION DATE & TIME: 4/28/2025 10:04 AM    PCP: Sally Coulter    ED PROVIDER: Carlos Rios M.D.      Chief Complaint   Patient presents with    Irregular Heart Beat         IMPRESSION  1. Sinus arrhythmia seen on electrocardiogram        PLAN  - close PCP follow up  - discharge to home    ED COURSE & MEDICAL DECISION MAKING    ED Course as of 04/28/25 1151   Mon Apr 28, 2025   1142 74yoF with history of pulmonary fibrosis, PE (takes Eliquis) presenting from outpatient echo for evaluation of irregular heart rate. Reports she awoke this morning feeling fine and her watch said she might be in a-fib. No known history of a-fib. No new symptoms. Went to outpatient echo as previously-scheduled and mentioned this to echo tech; thus brought to the ED after getting her echo.    SBP 160s on presentation with otherwise normal vitals. Exam unremarkable with normal work of breathing, benign abdomen, no notable peripheral edema, normal neuro exam.    EKG with NSR with mild sinus arrhythmia; no ischemic changes. Labs with with no DIALLO, no glaring electrolyte abnormality, no anemia, no leukocytosis, normal TSH, normal BNP (doubt CHF).    NSR while here in the ED; does not have a-fib. Has no new symptoms. Patient able to tolerate PO and walk without difficulty. Ok for outpatient management. Patient comfortable with discharge at this time. Return precautions and need for PCP follow up discussed and understood. No further questions at the time of discharge.       --------------------------------------------------------------------------------   --------------------------------------------------------------------------------     11:40 AM I met with the patient for the initial interview and physical examination. Discussed plan for treatment and workup in the ED.  11:42 AM We discussed the plan for  discharge and the patient is agreeable. Reviewed supportive cares, symptomatic treatment, outpatient follow up, and reasons to return to the Emergency Department. All questions and concerns were addressed. Patient to be discharged by ED RN.     This patient involved a high degree of complexity in medical decision making, as significant risks were present and assessed. Recent encounters & results in medical record reviewed by me.    All workup (i.e. any EKG/labs/imaging as per charting below) reviewed and independently interpreted by me. See respective sections for details.    Broad differential considered for this patient, including but not limited to:  a-fib, other arrhythmia, electrolyte derangement, DIALLO, anemia, anxiety, ACS, CHF, thyroid disease, sepsis      See additional MDM below if interested.      MEDICATIONS GIVEN IN THE EMERGENCY DEPARTMENT  Medications - No data to display        NEW PRESCRIPTIONS STARTED AT TODAY'S ER VISIT  Current Discharge Medication List        CONTINUE these medications which have NOT CHANGED    Details   cyanocobalamin (VITAMIN B-12) 500 MCG tablet Take 1,000 mcg by mouth daily      hydrochlorothiazide (MICROZIDE) 12.5 MG capsule Take 1 capsule (12.5 mg) by mouth daily as needed (for high blood pressure.  SBP >170 or DBP >90).  Qty: 30 capsule, Refills: 0    Associated Diagnoses: Hypertension, unspecified type      omeprazole (PRILOSEC) 20 MG DR capsule Take 20 mg by mouth 2 times daily.      rivaroxaban ANTICOAGULANT (XARELTO ANTICOAGULANT) 20 MG TABS tablet Take 1 tablet (20 mg) by mouth daily (with dinner).  Qty: 90 tablet, Refills: 0    Associated Diagnoses: Single subsegmental pulmonary embolism without acute cor pulmonale (H)      rosuvastatin (CRESTOR) 20 MG tablet Take 20 mg by mouth daily      spacer (OPTICHAMBER GONZALEZ) holding chamber Use with inhaler  Qty: 1 each, Refills: 0      VITRON-C  MG TABS tablet Take 1 tablet by mouth daily                  =================================================================      HPI  Use of : N/A        Lori Liu is a 74 year old female with a pertinent history of GERD, hyperlipidemia, and pulmonary embolism who presents to this ED via walk-in for evaluation of irregular heart beat.     Patient woke up this morning and her watch said she could be in A-fib. She then went to her scheduled ECHO and informed the staff about this and they recommend she visit the ED.       --------------- MEDICAL HISTORY ---------------  PAST MEDICAL HISTORY:  Reviewed independently by me.  Past Medical History:   Diagnosis Date    Anemia, iron deficiency     Antiplatelet or antithrombotic long-term use     Gastroesophageal reflux disease     Gastroesophageal reflux disease with esophagitis without hemorrhage     Mixed hyperlipidemia     SHASHI (obstructive sleep apnea)     Osteoarthritis of both knees     Pulmonary embolism (H)     on lifelong anticoagulation    Thrombosis      Patient Active Problem List   Diagnosis    Pulmonary emboli (H)    Exertional dyspnea    Abnormal cardiovascular stress test    Interstitial lung disease (H)    S/P repair of paraesophageal hernia    IPF (idiopathic pulmonary fibrosis) (H)       PAST SURGICAL HISTORY:  Reviewed independently by me.  Past Surgical History:   Procedure Laterality Date    LAPAROSCOPIC NISSEN FUNDOPLICATION N/A 8/27/2024    Procedure: paraesophageal hernia repair with mesh and partial fundoplication, ROBOT-ASSISTED, LAPAROSCOPIC;  Surgeon: Lisandro Farr DO;  Location: [facility] OR       CURRENT MEDICATIONS:    Reviewed independently by me.  No current facility-administered medications for this encounter.    Current Outpatient Medications:     cyanocobalamin (VITAMIN B-12) 500 MCG tablet, Take 1,000 mcg by mouth daily, Disp: , Rfl:     hydrochlorothiazide (MICROZIDE) 12.5 MG capsule, Take 1 capsule (12.5 mg) by mouth daily as needed (for high blood  pressure.  SBP >170 or DBP >90)., Disp: 30 capsule, Rfl: 0    omeprazole (PRILOSEC) 20 MG DR capsule, Take 20 mg by mouth 2 times daily., Disp: , Rfl:     rivaroxaban ANTICOAGULANT (XARELTO ANTICOAGULANT) 20 MG TABS tablet, Take 1 tablet (20 mg) by mouth daily (with dinner)., Disp: 90 tablet, Rfl: 0    rosuvastatin (CRESTOR) 20 MG tablet, Take 20 mg by mouth daily, Disp: , Rfl:     spacer (OPTICHAMBER GONZALEZ) holding chamber, Use with inhaler, Disp: 1 each, Rfl: 0    VITRON-C  MG TABS tablet, Take 1 tablet by mouth daily, Disp: , Rfl:     ALLERGIES:  Reviewed independently by me.  Allergies   Allergen Reactions    Cephalexin Itching and Rash    Penicillins Hives, Itching and Rash       FAMILY HISTORY:  Reviewed independently by me.  Family History   Problem Relation Age of Onset    Colon Cancer Mother     Clotting Disorder Father         Does not recall exact issue with bleeding vs clotting disorder    Lymphoma Sister     LUNG DISEASE No family hx of          SOCIAL HISTORY:   Reviewed independently by me.  Social History     Socioeconomic History    Marital status:    Tobacco Use    Smoking status: Former     Types: Cigarettes    Smokeless tobacco: Never   Vaping Use    Vaping status: Never Used   Substance and Sexual Activity    Alcohol use: Never    Drug use: Never     Social Drivers of Health     Financial Resource Strain: Low Risk  (8/27/2024)    Financial Resource Strain     Within the past 12 months, have you or your family members you live with been unable to get utilities (heat, electricity) when it was really needed?: No   Food Insecurity: Low Risk  (8/27/2024)    Food Insecurity     Within the past 12 months, did you worry that your food would run out before you got money to buy more?: No     Within the past 12 months, did the food you bought just not last and you didn t have money to get more?: No   Transportation Needs: Low Risk  (8/27/2024)    Transportation Needs     Within the past 12  "months, has lack of transportation kept you from medical appointments, getting your medicines, non-medical meetings or appointments, work, or from getting things that you need?: No   Interpersonal Safety: High Risk (8/27/2024)    Interpersonal Safety     Do you feel physically and emotionally safe where you currently live?: No     Within the past 12 months, have you been hit, slapped, kicked or otherwise physically hurt by someone?: No     Within the past 12 months, have you been humiliated or emotionally abused in other ways by your partner or ex-partner?: No   Housing Stability: Low Risk  (8/27/2024)    Housing Stability     Do you have housing? : Yes     Are you worried about losing your housing?: No       --------------- PHYSICAL EXAM ---------------  Nursing notes and vitals independently reviewed by me.  VITALS:  Vitals:    04/28/25 1009 04/28/25 1100   BP: (!) 162/97 (!) 148/77   Pulse: 85 76   Resp: 18 29   Temp: 97.7  F (36.5  C)    TempSrc: Oral    SpO2: 94% 94%   Weight: 104.1 kg (229 lb 6.4 oz)    Height: 1.651 m (5' 5\")        PHYSICAL EXAM:    General:  alert, interactive, no distress  Eyes:  conjunctivae clear, conjugate gaze  HENT:  atraumatic, nose with no rhinorrhea, oropharynx clear  Neck:  no meningismus  Cardiovascular:  HR 60s during exam, regular rhythm, no murmurs, brisk cap refill  Chest:  no chest wall tenderness  Pulmonary:  no stridor, normal phonation, normal work of breathing, clear lungs bilaterally  Abdomen:  soft, nondistended, nontender  :  no CVA tenderness  Back:  no midline spinal tenderness  Musculoskeletal:  no pretibial edema, no calf tenderness. Gross ROM intact to joints of extremities with no obvious deformities.  Skin:  warm, dry, no rash  Neuro:  awake, alert, answers questions appropriately, follows commands, moves all limbs  Psych:  calm, normal affect      --------------- RESULTS ---------------  EKG:    Reviewed and independently interpreted by me.  - NSR at 78bpm, " no ST or T wave changes, normal intervals  - unchanged from prior on 1/19/25  My read.    LAB:  Reviewed and independently interpreted by me.  Results for orders placed or performed during the hospital encounter of 04/28/25   Basic metabolic panel   Result Value Ref Range    Sodium 142 135 - 145 mmol/L    Potassium 3.9 3.4 - 5.3 mmol/L    Chloride 107 98 - 107 mmol/L    Carbon Dioxide (CO2) 27 22 - 29 mmol/L    Anion Gap 8 7 - 15 mmol/L    Urea Nitrogen 10.6 8.0 - 23.0 mg/dL    Creatinine 0.79 0.51 - 0.95 mg/dL    GFR Estimate 78 >60 mL/min/1.73m2    Calcium 9.3 8.8 - 10.4 mg/dL    Glucose 98 70 - 99 mg/dL   Hepatic function panel   Result Value Ref Range    Protein Total 6.5 6.4 - 8.3 g/dL    Albumin 3.8 3.5 - 5.2 g/dL    Bilirubin Total 0.6 <=1.2 mg/dL    Alkaline Phosphatase 49 40 - 150 U/L    AST 20 0 - 45 U/L    ALT 9 0 - 50 U/L    Bilirubin Direct 0.28 0.00 - 0.30 mg/dL   Result Value Ref Range    Lipase 25 13 - 60 U/L   Result Value Ref Range    Magnesium 2.1 1.7 - 2.3 mg/dL   Nt probnp inpatient (BNP)   Result Value Ref Range    N terminal Pro BNP Inpatient 863 0 - 900 pg/mL   TSH with free T4 reflex   Result Value Ref Range    TSH 0.83 0.30 - 4.20 uIU/mL   CBC with platelets and differential   Result Value Ref Range    WBC Count 6.8 4.0 - 11.0 10e3/uL    RBC Count 4.37 3.80 - 5.20 10e6/uL    Hemoglobin 14.6 11.7 - 15.7 g/dL    Hematocrit 43.5 35.0 - 47.0 %     78 - 100 fL    MCH 33.4 (H) 26.5 - 33.0 pg    MCHC 33.6 31.5 - 36.5 g/dL    RDW 13.6 10.0 - 15.0 %    Platelet Count 219 150 - 450 10e3/uL    % Neutrophils 75 %    % Lymphocytes 15 %    % Monocytes 8 %    % Eosinophils 2 %    % Basophils 0 %    % Immature Granulocytes 0 %    NRBCs per 100 WBC 0 <1 /100    Absolute Neutrophils 5.1 1.6 - 8.3 10e3/uL    Absolute Lymphocytes 1.1 0.8 - 5.3 10e3/uL    Absolute Monocytes 0.5 0.0 - 1.3 10e3/uL    Absolute Eosinophils 0.1 0.0 - 0.7 10e3/uL    Absolute Basophils 0.0 0.0 - 0.2 10e3/uL    Absolute Immature  Granulocytes 0.0 <=0.4 10e3/uL    Absolute NRBCs 0.0 10e3/uL         RADIOLOGY:  Reviewed and independently interpreted by me. Please see official radiology report.  Recent Results (from the past 24 hours)   Echocardiogram Complete   Result Value    LVEF  50-55%    Narrative    [id number]  [id number]  [id number]  [id number]^[address]^[place] [address]     Name: DIANA HERRING  MRN: [id number]  : 1950  Study Date: 2025 08:56 AM  Age: 74 yrs  Gender: Female  Patient Location: [facility]  Reason For Study: Pulmonary hypertension (H), SOB (shortness of breath)  Ordering Physician: TUCKER CEBALLOS  Referring Physician: TUCKER CEBALLOS  Performed By: THERESA     BSA: 2.0 m2  Height: 63 in  Weight: 221 lb  HR: 106  BP: 143/83 mmHg  ______________________________________________________________________________  Procedure  Echocardiogram with two-dimensional, color and spectral Doppler.  ______________________________________________________________________________  Interpretation Summary     The left ventricle is normal in size.  There is moderate concentric left ventricular hypertrophy.  The visual ejection fraction is 50-55%.  No regional wall motion abnormalities noted.  The right ventricle is normal size.  The right ventricular systolic function is normal.  The right ventricular systolic pressure is approximated at 29mmHg plus the  right atrial pressure.  Ascending Aorta dilatation is present.  Ascending aorta measures 3.9 cm  Echo findings similar to echo from 1/15/24.  ______________________________________________________________________________  Left Ventricle  The left ventricle is normal in size. There is moderate concentric left  ventricular hypertrophy. Diastolic function not assessed due to atrial  fibrillation. The visual ejection fraction is 50-55%. No regional wall motion  abnormalities noted.     Right Ventricle  The right ventricle is  normal size. The right ventricular systolic function is  normal.     Atria  The left atrium is moderately dilated. Right atrium not well visualized.     Mitral Valve  Mitral valve leaflets appear normal. There is trace mitral regurgitation.  There is no mitral valve stenosis.     Tricuspid Valve  The tricuspid valve is not well visualized. There is mild (1+) tricuspid  regurgitation. The right ventricular systolic pressure is approximated at  29mmHg plus the right atrial pressure.     Aortic Valve  The aortic valve is trileaflet. There is mild (1+) aortic regurgitation. This  aortic regurgitation is central valvular. No aortic stenosis is present.     Pulmonic Valve  The pulmonic valve is not well visualized. There is mild (1+) pulmonic  valvular regurgitation. There is no pulmonic valvular stenosis.     Vessels  The aorta root is normal. Ascending Aorta dilatation is present. Ascending  aorta measures 3.9 cm. IVC diameter <2.1 cm collapsing >50% with sniff  suggests a normal RA pressure of 3 mmHg.     Pericardium  There is no pericardial effusion.     Rhythm  The rhythm was rapid atrial fibrillation.  ______________________________________________________________________________  MMode/2D Measurements & Calculations  IVSd: 1.5 cm     LVIDd: 5.1 cm  LVIDs: 3.5 cm  LVPWd: 1.3 cm  FS: 32.0 %  LV mass(C)d: 306.7 grams  LV mass(C)dI: 152.0 grams/m2  Ao root diam: 3.5 cm  asc Aorta Diam: 3.9 cm  LVOT diam: 2.3 cm  LVOT area: 4.2 cm2  Ao root diam index Ht(cm/m): 2.2  Ao root diam index BSA (cm/m2): 1.7  Asc Ao diam index BSA (cm/m2): 1.9  Asc Ao diam index Ht(cm/m): 2.4  LA Volume (BP): 64.4 ml     LA Volume Index (BP): 31.9 ml/m2  LA Volume Indexed (AL/bp): 31.4 ml/m2  RWT: 0.53  TAPSE: 2.6 cm     Doppler Measurements & Calculations  MV E max kale: 85.5 cm/sec  MV dec slope: 856.3 cm/sec2  MV dec time: 0.12 sec     Ao V2 max: 140.0 cm/sec  Ao max P.0 mmHg  Ao V2 mean: 108.0 cm/sec  Ao mean P.0 mmHg  Ao V2 VTI:  26.1 cm  JUDITH(I,D): 2.6 cm2  JUDITH(V,D): 3.1 cm2  LV V1 max P.4 mmHg  LV V1 max: 104.5 cm/sec  LV V1 VTI: 16.5 cm  SV(LVOT): 68.7 ml  SI(LVOT): 34.1 ml/m2  PA V2 max: 83.4 cm/sec  PA max P.8 mmHg  PA acc time: 0.06 sec  AV Crio Ratio (DI): 0.75  JUDITH Index (cm2/m2): 1.3  E/E': 9.7  E/E' av.2  Lateral E/e': 6.6  Medial E/e': 9.7  Peak E' Ciro: 8.8 cm/sec  RV S Ciro: 17.8 cm/sec     ______________________________________________________________________________  Report approved by: Beto Johnson MD on 2025 10:37 AM               PROCEDURES:   Procedures   --------------------------------------------------------------------------------   Cardiac telemetry monitoring ordered by me secondary to the patient's history of  and to monitor the patient for dysrhythmia. Reviewed & independently interpreted by me. Revealed normal sinus rhythm, occasional mild sinus arrhythmia.  --------------------------------------------------------------------------------                     --------------- ADDITIONAL MDM ---------------  Sepsis/STEMI/Stroke Measures:  None    MIPS (CTPE, dental pain, [place], sinusitis, asthma/COPD, head trauma):  Not Applicable    History:  - I considered systemic symptoms of the presenting illness.  - Supplemental history from:       -- patient  - External Record(s) reviewed:       -- Inpatient/outpatient record (clinic visit 25), prior labs (blood 25), prior imaging (CT chest 25)       -- see above ED course & MDM for further details    Workup:  - Chart documentation above includes differential considered and my independent interpretation any EKGs, labs tests, and/or imaging  - emergent/severe conditions considered and evaluated for: see above differential & MDM  - In additional to work up documented, I considered the following work up:       -- CT chest       -- see above ED course & MDM for further details    Independent Interpretation:  - Independent interpretation of ECG and  images noted in documentation, when applicable.    External Consultation:  - Discussion of management with another provider:       -- US echo tech       -- see above ED course & MDM for additional    Complicating Factors:  - Care impacted by chronic illness:       -- see above MDM, past medical history, & problem list    Disposition Considerations:  - Discharge       -- I considered escalation of care with admission to the hospital, but ultimately discharged the patient given reassuring workup & exam, comfortable with discharge       -- I recommended the patient continue their current prescription strength medication(s) as charted above in current medications list       -- I considered prescription pain medication, comfortable without this       -- I recommended over-the-counter medication(s) as charted above & in discharge instructions             I, Fabiano Romo, am serving as a scribe to document services personally performed by Dr. Carlos Rios based on my observation and the provider's statements to me. I, Carlos Rios MD attest that Fabiano Romo is acting in a scribe capacity, has observed my performance of the services and has documented them in accordance with my direction.      Carlos Rios MD  04/28/25  Emergency Medicine  [facility] EMERGENCY ROOM  [address] [address]  [phone]  Dept: [phone]     Carlos Rios MD  04/28/25 0282

## 2025-04-28 NOTE — ED NOTES
Bed: [facility]-01  Expected date:   Expected time:   Means of arrival:   Comments:  For patient from Cardiac Rehab.

## 2025-05-13 ENCOUNTER — HOSPITAL ENCOUNTER (OUTPATIENT)
Dept: NUCLEAR MEDICINE | Facility: HOSPITAL | Age: 75
Discharge: HOME | End: 2025-05-13
Attending: INTERNAL MEDICINE
Payer: COMMERCIAL

## 2025-05-13 ENCOUNTER — HOSPITAL ENCOUNTER (OUTPATIENT)
Dept: RADIOLOGY | Facility: HOSPITAL | Age: 75
Discharge: HOME | End: 2025-05-13
Attending: INTERNAL MEDICINE
Payer: COMMERCIAL

## 2025-05-13 DIAGNOSIS — I27.20 PULMONARY HYPERTENSION (H): ICD-10-CM

## 2025-05-13 DIAGNOSIS — R06.02 SOB (SHORTNESS OF BREATH): ICD-10-CM

## 2025-05-13 PROCEDURE — 78582 LUNG VENTILAT&PERFUS IMAGING: CPT

## 2025-05-13 PROCEDURE — A9540 TC99M MAA: HCPCS | Performed by: INTERNAL MEDICINE

## 2025-05-13 PROCEDURE — A9567 TECHNETIUM TC-99M AEROSOL: HCPCS | Performed by: INTERNAL MEDICINE

## 2025-05-13 PROCEDURE — 343N000001 HC RX 343 MED OP 636: Performed by: INTERNAL MEDICINE

## 2025-05-13 PROCEDURE — 71046 X-RAY EXAM CHEST 2 VIEWS: CPT

## 2025-05-13 RX ADMIN — KIT FOR THE PREPARATION OF TECHNETIUM TC 99M ALBUMIN AGGREGATED 8.2 MILLICURIE: 2.5 INJECTION, POWDER, FOR SOLUTION INTRAVENOUS at 09:47

## 2025-05-13 RX ADMIN — KIT FOR THE PREPARATION OF TECHNETIUM TC 99M PENTETATE 58 MILLICURIE: 20 INJECTION, POWDER, LYOPHILIZED, FOR SOLUTION INTRAVENOUS; RESPIRATORY (INHALATION) at 08:58

## 2025-05-20 ENCOUNTER — TELEPHONE (OUTPATIENT)
Dept: PULMONOLOGY | Facility: CLINIC | Age: 75
End: 2025-05-20

## 2025-05-20 DIAGNOSIS — I27.20 PULMONARY HYPERTENSION (H): ICD-10-CM

## 2025-05-20 DIAGNOSIS — R06.02 SOB (SHORTNESS OF BREATH): ICD-10-CM

## 2025-05-20 PROCEDURE — 94618 PULMONARY STRESS TESTING: CPT

## 2025-05-20 NOTE — TELEPHONE ENCOUNTER
Pat had a 6MWT today. Chloe Hernandez  notified this staff RN that Cecy will need 2 liters of oxygen. Called Cecy to see if she wanted to see one of our providers today to get the oxygen ordered for her. She declines at this time. She has an appointment with Dr.. Hernadez next week and she would rather wait to talk to him.

## 2025-05-22 ENCOUNTER — PRE VISIT (OUTPATIENT)
Dept: CARDIOLOGY | Facility: CLINIC | Age: 75
End: 2025-05-22
Payer: COMMERCIAL

## 2025-05-22 LAB — FIO2-PRE: 0.28 %

## 2025-05-22 NOTE — TELEPHONE ENCOUNTER
Patient Name: Lori Liu Age: 74 year old, female, 1950 MRN: [id number]   Referring Provider:  Yumiko Cooper,  Appt:         HARISH Medications:        Patient History: Pt has a history of  PALMER, haital hernia, PE (2021) on DOAC, apnea (on CPAP 2022), COPD,     Recent events: Followed by [facility] [place] for PALMER. Recommended CTA and Echo (no significant finding, slight enlargment of ascending aorta)- recommended repeat echo x1 year and pulmonary referral    Reason for referral: Referred by Yumiko Cooper (pulmonary). Recently saw patient 2/19 for pulmonary fibrosis. Light referral for consultation      Recent Testing:       Date Test Epic Media/Scan Care Everywhere     Angio/Stress    Lexiscan stress ECG negative for ischemia.    The nuclear stress test is abnormal.  There is a small area of fixed defect involving the distal anteroseptal, anterior, anterolateral and apical wall likely due to breast attenuation.  Cannot exclude small area of underlying nontransmural infarction.    The left ventricular ejection fraction at stress is greater than 70% without wall motion abnormality.    A prior study was conducted on 3/24/2011.  Prior images were unavailable for comparison review.    The patient is at a low risk of future cardiac ischemic events. []  []   []      Echo      Left ventricular size, wall motion and function are normal. The ejection  fraction is 60-65%.  Normal right ventricle size and systolic function.  The left atrium is mildly dilated.  IVC diameter <2.1 cm collapsing >50% with sniff suggests a normal RA pressure  of 3 mmHg.  Ascending Aorta dilatation is present(4 cm)  No hemodynamically significant valvular abnormalities on 2D or color flow  imaging.                           []  []   []      PFT              FEV1/FVC  is normal     FEV1 is normal     FVC is normal     TLC is reduced at 68% predicted     DLCO is reduced at 57% predicted when it is corrected for  hemoglobin.         Impression: Moderate Restrcition and reduction in diffusion capacity  []   []   []      Sleep Study   []  []   []      Chest CT    A calcium score of zero places the individual in the lowest quartile when compared to an age and gender matched control group and implies a low risk of cardiac events in the next 10 years. (less than 1% per year).  Normal coronary CT angiography.  No detectable coronary atherosclerotic plaques.  Mild ascending aortic dilation 40 x 41 mm.    No pericardial effusion or calcified pericardium.  No thrombus in lef tatrial appendage.     []  []   []      V/Q Scan    FINDINGS: Single small matched possibly segmental perfusion and ventilation defect involving the posterior-inferior left lower lung on the left lateral view. Otherwise normal ventilation and perfusion elsewhere. Cardiomegaly.  Posterior []   []   []      Abd/Liver US []   []   []      Misc:  []   []   []         []   []   []

## 2025-05-27 ENCOUNTER — OFFICE VISIT (OUTPATIENT)
Dept: CARDIOLOGY | Facility: CLINIC | Age: 75
End: 2025-05-27
Payer: COMMERCIAL

## 2025-05-27 ENCOUNTER — LAB (OUTPATIENT)
Dept: CARDIOLOGY | Facility: CLINIC | Age: 75
End: 2025-05-27
Payer: COMMERCIAL

## 2025-05-27 VITALS
DIASTOLIC BLOOD PRESSURE: 83 MMHG | OXYGEN SATURATION: 96 % | BODY MASS INDEX: 37.28 KG/M2 | WEIGHT: 224 LBS | HEART RATE: 57 BPM | SYSTOLIC BLOOD PRESSURE: 131 MMHG

## 2025-05-27 DIAGNOSIS — R06.09 DOE (DYSPNEA ON EXERTION): Primary | ICD-10-CM

## 2025-05-27 DIAGNOSIS — Z11.59 ENCOUNTER FOR SCREENING FOR OTHER VIRAL DISEASES: ICD-10-CM

## 2025-05-27 DIAGNOSIS — I27.20 PULMONARY HYPERTENSION (H): ICD-10-CM

## 2025-05-27 DIAGNOSIS — D64.9 ANEMIA, UNSPECIFIED: ICD-10-CM

## 2025-05-27 DIAGNOSIS — R06.02 SOB (SHORTNESS OF BREATH): ICD-10-CM

## 2025-05-27 DIAGNOSIS — Z09 ENCOUNTER FOR FOLLOW-UP EXAMINATION AFTER COMPLETED TREATMENT FOR CONDITIONS OTHER THAN MALIGNANT NEOPLASM: ICD-10-CM

## 2025-05-27 LAB
CHOLEST SERPL-MCNC: 116 MG/DL
ERYTHROCYTE [DISTWIDTH] IN BLOOD BY AUTOMATED COUNT: 13.7 % (ref 10–15)
FASTING STATUS PATIENT QL REPORTED: NO
HBV CORE AB SERPL QL IA: NONREACTIVE
HBV SURFACE AB SERPL IA-ACNC: <3.5 M[IU]/ML
HBV SURFACE AB SERPL IA-ACNC: NONREACTIVE M[IU]/ML
HBV SURFACE AG SERPL QL IA: NONREACTIVE
HCT VFR BLD AUTO: 42.8 % (ref 35–47)
HCV AB SERPL QL IA: NONREACTIVE
HDLC SERPL-MCNC: 60 MG/DL
HGB BLD-MCNC: 14.2 G/DL (ref 11.7–15.7)
HIV 1+2 AB+HIV1 P24 AG SERPL QL IA: NONREACTIVE
INR PPP: 1.41 (ref 0.85–1.15)
IRON BINDING CAPACITY (ROCHE): 254 UG/DL (ref 240–430)
IRON SATN MFR SERPL: 34 % (ref 15–46)
IRON SERPL-MCNC: 86 UG/DL (ref 37–145)
LDLC SERPL CALC-MCNC: 42 MG/DL
MCH RBC QN AUTO: 32.9 PG (ref 26.5–33)
MCHC RBC AUTO-ENTMCNC: 33.2 G/DL (ref 31.5–36.5)
MCV RBC AUTO: 99 FL (ref 78–100)
NONHDLC SERPL-MCNC: 56 MG/DL
NT-PROBNP SERPL-MCNC: 239 PG/ML (ref 0–624)
PLATELET # BLD AUTO: 239 10E3/UL (ref 150–450)
PROTHROMBIN TIME: 17.4 SECONDS (ref 11.8–14.8)
RBC # BLD AUTO: 4.32 10E6/UL (ref 3.8–5.2)
TRIGL SERPL-MCNC: 68 MG/DL
TSH SERPL DL<=0.005 MIU/L-ACNC: 0.91 UIU/ML (ref 0.3–4.2)
WBC # BLD AUTO: 6.5 10E3/UL (ref 4–11)

## 2025-05-27 PROCEDURE — 99417 PROLNG OP E/M EACH 15 MIN: CPT | Performed by: INTERNAL MEDICINE

## 2025-05-27 PROCEDURE — 86704 HEP B CORE ANTIBODY TOTAL: CPT

## 2025-05-27 PROCEDURE — 85732 THROMBOPLASTIN TIME PARTIAL: CPT | Mod: 59

## 2025-05-27 PROCEDURE — 85027 COMPLETE CBC AUTOMATED: CPT

## 2025-05-27 PROCEDURE — 84443 ASSAY THYROID STIM HORMONE: CPT

## 2025-05-27 PROCEDURE — 3075F SYST BP GE 130 - 139MM HG: CPT | Performed by: INTERNAL MEDICINE

## 2025-05-27 PROCEDURE — 85613 RUSSELL VIPER VENOM DILUTED: CPT

## 2025-05-27 PROCEDURE — 86706 HEP B SURFACE ANTIBODY: CPT

## 2025-05-27 PROCEDURE — 3048F LDL-C <100 MG/DL: CPT

## 2025-05-27 PROCEDURE — 83540 ASSAY OF IRON: CPT

## 2025-05-27 PROCEDURE — 85520 HEPARIN ASSAY: CPT

## 2025-05-27 PROCEDURE — 36415 COLL VENOUS BLD VENIPUNCTURE: CPT

## 2025-05-27 PROCEDURE — 84238 ASSAY NONENDOCRINE RECEPTOR: CPT | Mod: 90

## 2025-05-27 PROCEDURE — 3079F DIAST BP 80-89 MM HG: CPT | Performed by: INTERNAL MEDICINE

## 2025-05-27 PROCEDURE — 87340 HEPATITIS B SURFACE AG IA: CPT

## 2025-05-27 PROCEDURE — 83550 IRON BINDING TEST: CPT

## 2025-05-27 PROCEDURE — 99000 SPECIMEN HANDLING OFFICE-LAB: CPT

## 2025-05-27 PROCEDURE — 85670 THROMBIN TIME PLASMA: CPT

## 2025-05-27 PROCEDURE — 80061 LIPID PANEL: CPT

## 2025-05-27 PROCEDURE — 83880 ASSAY OF NATRIURETIC PEPTIDE: CPT

## 2025-05-27 PROCEDURE — 86803 HEPATITIS C AB TEST: CPT

## 2025-05-27 PROCEDURE — 85598 HEXAGNAL PHOSPH PLTLT NEUTRL: CPT

## 2025-05-27 PROCEDURE — 85610 PROTHROMBIN TIME: CPT

## 2025-05-27 PROCEDURE — 83529 ASAY OF INTERLEUKIN-6 (IL-6): CPT

## 2025-05-27 PROCEDURE — 85390 FIBRINOLYSINS SCREEN I&R: CPT | Mod: 26 | Performed by: PATHOLOGY

## 2025-05-27 PROCEDURE — 85730 THROMBOPLASTIN TIME PARTIAL: CPT | Mod: 59

## 2025-05-27 PROCEDURE — 87389 HIV-1 AG W/HIV-1&-2 AB AG IA: CPT

## 2025-05-27 NOTE — LETTER
2025    Sally Coulter MD  [facility]  [address] [place] [address]    RE: Lori Liu       Dear Colleague,     I had the pleasure of seeing Lori Liu in the [facility].  Service Date: May 28, 2025    RE:  Lori Liu   MRN:  [id number]  :  1950      Dear Dr. Calderon:    We had the pleasure of seeing Lori Liu at the [facility]. Although you are familiar with this patient's history, please allow me to summarize it for the purpose of our records.     She is a 74-year-old female with past medical history significant for DVT/PE and interstitial lung disease related to hiatal hernia and recurrent aspiration.  She was referred to us to exclude pulmonary hypertension given her history of DVT/PE and ILD.    Her main symptom at present is exertional shortness of breath and dry cough.  She has no symptoms at rest or with her usual activities.  She gets shortness of breath only when she climbs a couple of flights of stairs, walk uphill or when carrying weight.  I would currently characterize as functional class II.  Her symptoms started 3 years ago after her DVT and PE but they are not progressive.  They have been stable.  She denies having lower extremity swelling or abdominal distention.  She has no exertional chest pain or chest pressure.  She has no exertional presyncope or syncope.  She does not use supplemental oxygen.  She has not had any recent hospitalization or ER visit.    She has no prior history of autoimmune diseases, congenital heart disease, liver disease, appetite suppressant drug use, and left heart disease.    PAST MEDICAL HISTORY:  1.  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  Interstitial lung disease likely related to hiatal hernia with recurrent aspiration  3.  Obstructive sleep apnea  4.  Hyperlipidemia  5.  Acid reflux  disease    PAST SURGICAL HISTORY:  Past Surgical History:   Procedure Laterality Date     LAPAROSCOPIC NISSEN FUNDOPLICATION N/A 8/27/2024       CURRENT MEDICATIONS:  Current Outpatient Medications   Medication Sig Dispense Refill     cyanocobalamin (VITAMIN B-12) 500 MCG tablet Take 1,000 mcg by mouth daily       omeprazole (PRILOSEC) 20 MG DR capsule Take 20 mg by mouth 2 times daily.       rivaroxaban ANTICOAGULANT (XARELTO ANTICOAGULANT) 20 MG TABS tablet Take 1 tablet (20 mg) by mouth daily (with dinner). 90 tablet 0     rosuvastatin (CRESTOR) 20 MG tablet Take 20 mg by mouth daily       VITRON-C  MG TABS tablet Take 1 tablet by mouth daily       No current facility-administered medications for this visit.       ROS:   10 point ROS negative except as discussed in above HPI.    SOCIAL HISTORY:  She lives with her .  She is retired now.  She worked as a  in the past.  Her kids are grown and healthy.  She quit smoking when she was 59.  She is smoked for 40 years half a pack to 1 pack a day.  She does not use any recreational drugs.  She does not drink alcohol.    FAMILY HISTORY:  Family History   Problem Relation Age of Onset     Colon Cancer Mother      Clotting Disorder Father         Does not recall exact issue with bleeding vs clotting disorder     Lymphoma Sister      LUNG DISEASE No family hx of        EXAM:  /83 (BP Location: Left arm, Patient Position: Sitting, Cuff Size: Adult Large)   Pulse 57   Wt 101.6 kg (224 lb)   SpO2 96%   BMI 37.28 kg/m    Awake, alert, and oriented x 3.   Comfortable. No apparent distress.  No pallor, cyanosis, or clubbing  Carotids 2+ bilateral and pulse was regular in rhythm.  No JVD  Heart: regular, normal S1/S2, no murmur, gallop, rub  Lungs: NVBS and clear to auscultation bilaterally, no rales or wheezing.  Bilateral basal crepitations.  Abdomen: soft, non-tender, bowel sounds present, no hepatosplenomegaly  Extremities: no  edema  Neurological: No gross focal neurological deficit    Labs:  Recent Results (from the past week)   Lipid Profile    Collection Time: 05/27/25  2:07 PM   Result Value Ref Range    Cholesterol 116 <200 mg/dL    Triglycerides 68 <150 mg/dL    Direct Measure HDL 60 >=50 mg/dL    LDL Cholesterol Calculated 42 <100 mg/dL    Non HDL Cholesterol 56 <130 mg/dL    Patient Fasting > 8hrs? No    CBC with platelets    Collection Time: 05/27/25  2:07 PM   Result Value Ref Range    WBC Count 6.5 4.0 - 11.0 10e3/uL    RBC Count 4.32 3.80 - 5.20 10e6/uL    Hemoglobin 14.2 11.7 - 15.7 g/dL    Hematocrit 42.8 35.0 - 47.0 %    MCV 99 78 - 100 fL    MCH 32.9 26.5 - 33.0 pg    MCHC 33.2 31.5 - 36.5 g/dL    RDW 13.7 10.0 - 15.0 %    Platelet Count 239 150 - 450 10e3/uL   INR    Collection Time: 05/27/25  2:07 PM   Result Value Ref Range    INR 1.41 (H) 0.85 - 1.15    PT 17.4 (H) 11.8 - 14.8 Seconds   N terminal pro BNP outpatient    Collection Time: 05/27/25  2:07 PM   Result Value Ref Range    NT-proBNP 239 0 - 624 pg/mL   TSH    Collection Time: 05/27/25  2:07 PM   Result Value Ref Range    TSH 0.91 0.30 - 4.20 uIU/mL   Lupus Anticoagulant Panel    Collection Time: 05/27/25  2:07 PM   Result Value Ref Range    PTT Ratio 1.39 (H) <1.30    PTT Confirm <8.0 <8.0 Seconds    PTT 1:2 MIX 41 31 - 45 Seconds    DRVVT Screen Ratio 1.94 (H) <1.08    DRVVT Confirm Normalized Ratio 1.36 (H) <1.21    DRVVT Screen Mix Ratio 1.44 (H) <1.08    Thrombin Time 19.2 (H) 13.0 - 19.0 Seconds    Lupus ANTI-XA Detected (A) Not Detected    Lupus Result Positive (A) Negative    Lupus Interpretation       Results  APTT ratio is elevated.  APTT Confirm is negative.  APTT Mix is normal.  DRVVT Screen ratio is elevated.  DRVVT Confirm Normalized ratio is positive.  DRVVT Screen Mix ratio is elevated.  The PT/INR is elevated.  Thrombin time is elevated.  Reactivity is detected in the Anti-Xa assay.    Interpretation  POSITIVE TEST; LUPUS ANTICOAGULANT TESTING  IS POSITIVE; HOWEVER ANTICOAGULATION DETECTED     Recommendations  APTT ratio, APTT Confirm, and APTT Mix are consistent with factor deficiency.  DRVVT Screen ratio, DRVVT Confirm Normalized ratio, and DRVVT Screen Mix ratio  are consistent with a Lupus Anticoagulant.  Clinical correlation is recommended.  Recommend repeat testing in 12 weeks to determine if the Lupus Anticoagulant is persisite or transient, since a transient one does not confer an increased thrombotic risk.  Patient with a Lupus Anticoagulant on vitamin K antagonist therapy (e.g., warfarin) should be considered for monitoring with a chromogenic factor 10 (factor X)  assay or a factor 2 (factor II) assay.  If the patient is on an anticoagulant, recommend repeat testing without anticoagulant interference, as anticoagulation may cause false positive or false negative findings.  Causes of a prolonged Thrombin Time include hypofibrinogenemia, dysfibrinogenemia, elevated fibrin degradation products (e.g., D-dimer), anticoagulants (e.g., heparin and direct thrombin inhibitors), and inhibitors.  The Anti-Xa assay is consistent with the presence of anticoagulant.  The patient is reported to be on rivaroxaban.     Nenita Mitchell MD, PhD  [facility] [place]      Signout Location if Remote Report signed out at: [email or web address]    Hepatitis B core antibody    Collection Time: 05/27/25  2:07 PM   Result Value Ref Range    Hepatitis B Core Antibody Total Nonreactive Nonreactive   Hepatitis B Surface Antibody    Collection Time: 05/27/25  2:07 PM   Result Value Ref Range    Hepatitis B Surface Antibody Nonreactive     Hepatitis B Surface Antibody Instrument Value <3.50 <8.5 m[IU]/mL   Hepatitis B surface antigen    Collection Time: 05/27/25  2:07 PM   Result Value Ref Range    Hepatitis B Surface Antigen Nonreactive Nonreactive   Hepatitis C antibody    Collection Time: 05/27/25  2:07 PM   Result Value Ref Range    Hepatitis C Antibody Nonreactive Nonreactive   HIV Antigen  Antibody Combo    Collection Time: 05/27/25  2:07 PM   Result Value Ref Range    HIV Antigen Antibody Combo Nonreactive Nonreactive   Iron and iron binding capacity    Collection Time: 05/27/25  2:07 PM   Result Value Ref Range    Iron 86 37 - 145 ug/dL    Iron Binding Capacity 254 240 - 430 ug/dL    Iron Sat Index 34 15 - 46 %         EKG (04/2025):  Sinus rhythm with sinus arrhythmia, left axis deviation and possible lateral infarct.    Echocardiogram (04/2025):  The left ventricle is normal in size.  There is moderate concentric left ventricular hypertrophy.  The visual ejection fraction is 50-55%.  No regional wall motion abnormalities noted.  The right ventricle is normal size.  The right ventricular systolic function is normal.  The right ventricular systolic pressure is approximated at 29mmHg plus the  right atrial pressure.  Ascending Aorta dilatation is present.  Ascending aorta measures 3.9 cm  Echo findings similar to echo from 1/15/24.      PFT (02/2025)        Latest Ref Rng & Units 2/19/2025     1:33 PM   PFT   FVC L 2.12    FEV1 L 1.85    FVC% % 81    FEV1% % 92      Her FEV1 by FVC ratio was 87%, TLC was 68%, and DLCO was 58% predicted.    V/Q scan (05/2025)  Low probability for chronic thromboembolic pulm hypertension.    CT Chest (01/2025)  No pulmonary embolism.     6MWT (05/2025):  She walked 256 m.  She desaturated on room air.  She required 2 L of supplemental oxygen to maintain a saturation more than 90%.    Assessment and Plan:     In summary, Lori Liu is a 74-year-old female with past medical history significant for DVT/PE and interstitial lung disease related to recurrent aspiration from hiatal hernia was referred to us for further evaluation and management of pulm hypertension.    Currently, the clinical index of suspicion for pulm hypertension in her is very low.  Clinically she has no evidence of right heart failure.  Her echocardiogram shows normal right ventricular size and  function.  Her estimated right ventricular systolic pressure, although not a good TR jet and mild, is within normal limits.  Her NT proBNP is within normal limits.  Also, her VQ scan is low probability excluding chronic thromboembolic pulm hypertension.  Her serological workup for other associated causes of pulmonary arterial hypertension is negative.    I suspect her exertional hypoxemia is secondary to her mild interstitial lung disease.  This also is likely the explanation for her exertional shortness of breath.  She will likely benefit from supplemental oxygen for symptomatic relief and for preventing progression of pulm vascular disease.  Will coordinate this with Dr. Allison Neely her pulmonologist.    No strong indication to do right heart catheterization.  She is also hesitant which I think is very reasonable.  No further testing needed.    I have recommended her to continue to follow with Dr. Allison Calderon in pulmonary.  Will see her in a year with repeat echocardiogram.    It was a pleasure seeing Lori Liu at the [facility]. Please contact us with any questions or concerns that you may have. We thank you for involving us in this patients care.    Total time today was 75 minutes reviewing notes, imaging, labs, patient visit, orders and documentation     Sincerely,      Maricarmen Hernadez MD  Professor of Medicine  [facility]  Heart Failure, Transplant, and Mechanical Circulatory Support Cardiology   Cardiovascular Division  [facility]   [phone]          Thank you for allowing me to participate in the care of your patient.      Sincerely,     Maricarmen Hernadez MD     [facility]  cc:   Yumiko Cooper MD  [address]  [place] [address]

## 2025-05-27 NOTE — LETTER
2025      RE: Lori Liu  [address]  [facility] [place] [address]       Service Date: May 28, 2025    RE:  Lori Liu   MRN:  [id number]  :  1950      Dear Dr. Calderon:    We had the pleasure of seeing Lori Liu at the [facility]. Although you are familiar with this patient's history, please allow me to summarize it for the purpose of our records.     She is a 74-year-old female with past medical history significant for DVT/PE and interstitial lung disease related to hiatal hernia and recurrent aspiration.  She was referred to us to exclude pulmonary hypertension given her history of DVT/PE and ILD.    Her main symptom at present is exertional shortness of breath and dry cough.  She has no symptoms at rest or with her usual activities.  She gets shortness of breath only when she climbs a couple of flights of stairs, walk uphill or when carrying weight.  I would currently characterize as functional class II.  Her symptoms started 3 years ago after her DVT and PE but they are not progressive.  They have been stable.  She denies having lower extremity swelling or abdominal distention.  She has no exertional chest pain or chest pressure.  She has no exertional presyncope or syncope.  She does not use supplemental oxygen.  She has not had any recent hospitalization or ER visit.    She has no prior history of autoimmune diseases, congenital heart disease, liver disease, appetite suppressant drug use, and left heart disease.    PAST MEDICAL HISTORY:  1.  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  Interstitial lung disease likely related to hiatal hernia with recurrent aspiration  3.  Obstructive sleep apnea  4.  Hyperlipidemia  5.  Acid reflux disease    PAST SURGICAL HISTORY:  Past Surgical History:   Procedure Laterality Date     LAPAROSCOPIC NISSEN FUNDOPLICATION N/A 2024        CURRENT MEDICATIONS:  Current Outpatient Medications   Medication Sig Dispense Refill     cyanocobalamin (VITAMIN B-12) 500 MCG tablet Take 1,000 mcg by mouth daily       omeprazole (PRILOSEC) 20 MG DR capsule Take 20 mg by mouth 2 times daily.       rivaroxaban ANTICOAGULANT (XARELTO ANTICOAGULANT) 20 MG TABS tablet Take 1 tablet (20 mg) by mouth daily (with dinner). 90 tablet 0     rosuvastatin (CRESTOR) 20 MG tablet Take 20 mg by mouth daily       VITRON-C  MG TABS tablet Take 1 tablet by mouth daily       No current facility-administered medications for this visit.       ROS:   10 point ROS negative except as discussed in above HPI.    SOCIAL HISTORY:  She lives with her .  She is retired now.  She worked as a  in the past.  Her kids are grown and healthy.  She quit smoking when she was 59.  She is smoked for 40 years half a pack to 1 pack a day.  She does not use any recreational drugs.  She does not drink alcohol.    FAMILY HISTORY:  Family History   Problem Relation Age of Onset     Colon Cancer Mother      Clotting Disorder Father         Does not recall exact issue with bleeding vs clotting disorder     Lymphoma Sister      LUNG DISEASE No family hx of        EXAM:  /83 (BP Location: Left arm, Patient Position: Sitting, Cuff Size: Adult Large)   Pulse 57   Wt 101.6 kg (224 lb)   SpO2 96%   BMI 37.28 kg/m    Awake, alert, and oriented x 3.   Comfortable. No apparent distress.  No pallor, cyanosis, or clubbing  Carotids 2+ bilateral and pulse was regular in rhythm.  No JVD  Heart: regular, normal S1/S2, no murmur, gallop, rub  Lungs: NVBS and clear to auscultation bilaterally, no rales or wheezing.  Bilateral basal crepitations.  Abdomen: soft, non-tender, bowel sounds present, no hepatosplenomegaly  Extremities: no edema  Neurological: No gross focal neurological deficit    Labs:  Recent Results (from the past week)   Lipid Profile    Collection Time:  05/27/25  2:07 PM   Result Value Ref Range    Cholesterol 116 <200 mg/dL    Triglycerides 68 <150 mg/dL    Direct Measure HDL 60 >=50 mg/dL    LDL Cholesterol Calculated 42 <100 mg/dL    Non HDL Cholesterol 56 <130 mg/dL    Patient Fasting > 8hrs? No    CBC with platelets    Collection Time: 05/27/25  2:07 PM   Result Value Ref Range    WBC Count 6.5 4.0 - 11.0 10e3/uL    RBC Count 4.32 3.80 - 5.20 10e6/uL    Hemoglobin 14.2 11.7 - 15.7 g/dL    Hematocrit 42.8 35.0 - 47.0 %    MCV 99 78 - 100 fL    MCH 32.9 26.5 - 33.0 pg    MCHC 33.2 31.5 - 36.5 g/dL    RDW 13.7 10.0 - 15.0 %    Platelet Count 239 150 - 450 10e3/uL   INR    Collection Time: 05/27/25  2:07 PM   Result Value Ref Range    INR 1.41 (H) 0.85 - 1.15    PT 17.4 (H) 11.8 - 14.8 Seconds   N terminal pro BNP outpatient    Collection Time: 05/27/25  2:07 PM   Result Value Ref Range    NT-proBNP 239 0 - 624 pg/mL   TSH    Collection Time: 05/27/25  2:07 PM   Result Value Ref Range    TSH 0.91 0.30 - 4.20 uIU/mL   Lupus Anticoagulant Panel    Collection Time: 05/27/25  2:07 PM   Result Value Ref Range    PTT Ratio 1.39 (H) <1.30    PTT Confirm <8.0 <8.0 Seconds    PTT 1:2 MIX 41 31 - 45 Seconds    DRVVT Screen Ratio 1.94 (H) <1.08    DRVVT Confirm Normalized Ratio 1.36 (H) <1.21    DRVVT Screen Mix Ratio 1.44 (H) <1.08    Thrombin Time 19.2 (H) 13.0 - 19.0 Seconds    Lupus ANTI-XA Detected (A) Not Detected    Lupus Result Positive (A) Negative    Lupus Interpretation       Results  APTT ratio is elevated.  APTT Confirm is negative.  APTT Mix is normal.  DRVVT Screen ratio is elevated.  DRVVT Confirm Normalized ratio is positive.  DRVVT Screen Mix ratio is elevated.  The PT/INR is elevated.  Thrombin time is elevated.  Reactivity is detected in the Anti-Xa assay.    Interpretation  POSITIVE TEST; LUPUS ANTICOAGULANT TESTING IS POSITIVE; HOWEVER ANTICOAGULATION DETECTED     Recommendations  APTT ratio, APTT Confirm, and APTT Mix are consistent with factor  deficiency.  DRVVT Screen ratio, DRVVT Confirm Normalized ratio, and DRVVT Screen Mix ratio  are consistent with a Lupus Anticoagulant.  Clinical correlation is recommended.  Recommend repeat testing in 12 weeks to determine if the Lupus Anticoagulant is persisite or transient, since a transient one does not confer an increased thrombotic risk.  Patient with a Lupus Anticoagulant on vitamin K antagonist therapy (e.g., warfarin) should be considered for monitoring with a chromogenic factor 10 (factor X)  assay or a factor 2 (factor II) assay.  If the patient is on an anticoagulant, recommend repeat testing without anticoagulant interference, as anticoagulation may cause false positive or false negative findings.  Causes of a prolonged Thrombin Time include hypofibrinogenemia, dysfibrinogenemia, elevated fibrin degradation products (e.g., D-dimer), anticoagulants (e.g., heparin and direct thrombin inhibitors), and inhibitors.  The Anti-Xa assay is consistent with the presence of anticoagulant.  The patient is reported to be on rivaroxaban.     Nenita Mitchell MD, PhD  [facility]      Signout Location if Remote Report signed out at: [email or web address]    Hepatitis B core antibody    Collection Time: 05/27/25  2:07 PM   Result Value Ref Range    Hepatitis B Core Antibody Total Nonreactive Nonreactive   Hepatitis B Surface Antibody    Collection Time: 05/27/25  2:07 PM   Result Value Ref Range    Hepatitis B Surface Antibody Nonreactive     Hepatitis B Surface Antibody Instrument Value <3.50 <8.5 m[IU]/mL   Hepatitis B surface antigen    Collection Time: 05/27/25  2:07 PM   Result Value Ref Range    Hepatitis B Surface Antigen Nonreactive Nonreactive   Hepatitis C antibody    Collection Time: 05/27/25  2:07 PM   Result Value Ref Range    Hepatitis C Antibody Nonreactive Nonreactive   HIV Antigen Antibody Combo    Collection Time: 05/27/25  2:07 PM   Result Value Ref Range    HIV Antigen Antibody Combo Nonreactive Nonreactive    Iron and iron binding capacity    Collection Time: 05/27/25  2:07 PM   Result Value Ref Range    Iron 86 37 - 145 ug/dL    Iron Binding Capacity 254 240 - 430 ug/dL    Iron Sat Index 34 15 - 46 %         EKG (04/2025):  Sinus rhythm with sinus arrhythmia, left axis deviation and possible lateral infarct.    Echocardiogram (04/2025):  The left ventricle is normal in size.  There is moderate concentric left ventricular hypertrophy.  The visual ejection fraction is 50-55%.  No regional wall motion abnormalities noted.  The right ventricle is normal size.  The right ventricular systolic function is normal.  The right ventricular systolic pressure is approximated at 29mmHg plus the  right atrial pressure.  Ascending Aorta dilatation is present.  Ascending aorta measures 3.9 cm  Echo findings similar to echo from 1/15/24.      PFT (02/2025)        Latest Ref Rng & Units 2/19/2025     1:33 PM   PFT   FVC L 2.12    FEV1 L 1.85    FVC% % 81    FEV1% % 92      Her FEV1 by FVC ratio was 87%, TLC was 68%, and DLCO was 58% predicted.    V/Q scan (05/2025)  Low probability for chronic thromboembolic pulm hypertension.    CT Chest (01/2025)  No pulmonary embolism.     6MWT (05/2025):  She walked 256 m.  She desaturated on room air.  She required 2 L of supplemental oxygen to maintain a saturation more than 90%.    Assessment and Plan:     In summary, Lori Liu is a 74-year-old female with past medical history significant for DVT/PE and interstitial lung disease related to recurrent aspiration from hiatal hernia was referred to us for further evaluation and management of pulm hypertension.    Currently, the clinical index of suspicion for pulm hypertension in her is very low.  Clinically she has no evidence of right heart failure.  Her echocardiogram shows normal right ventricular size and function.  Her estimated right ventricular systolic pressure, although not a good TR jet and mild, is within normal limits.  Her NT  proBNP is within normal limits.  Also, her VQ scan is low probability excluding chronic thromboembolic pulm hypertension.  Her serological workup for other associated causes of pulmonary arterial hypertension is negative.    I suspect her exertional hypoxemia is secondary to her mild interstitial lung disease.  This also is likely the explanation for her exertional shortness of breath.  She will likely benefit from supplemental oxygen for symptomatic relief and for preventing progression of pulm vascular disease.  Will coordinate this with Dr. Allison Neely her pulmonologist.    No strong indication to do right heart catheterization.  She is also hesitant which I think is very reasonable.  No further testing needed.    I have recommended her to continue to follow with Dr. Allison Calderon in pulmonary.  Will see her in a year with repeat echocardiogram.    It was a pleasure seeing Lori SHREYA DenneyNoe at the [facility]. Please contact us with any questions or concerns that you may have. We thank you for involving us in this patients care.    Total time today was 75 minutes reviewing notes, imaging, labs, patient visit, orders and documentation     Sincerely,      Maricarmen Hernadez MD  Professor of Medicine  [facility]  Heart Failure, Transplant, and Mechanical Circulatory Support Cardiology   Cardiovascular Division  [facility]   [phone]          Maricarmen Hernadez MD

## 2025-05-28 LAB
DELTA APTT HEX PPP: <8 SECONDS
HEPARIN PPP CHRO-ACNC: DETECTED
LABORATORY COMMENT REPORT: ABNORMAL
LABORATORY COMMENT REPORT: POSITIVE
LOCATION OF TASK: ABNORMAL
MIXING APTT: 41 SECONDS (ref 31–45)
MIXING DRVVT/NORMAL: 1.44 %
NORMALIZED CONFIRM DRVVT STACLOT: 1.36 %
SCREEN APTT/NORMAL: 1.39
SCREEN DRVVT/NORMAL: 1.94 %
THROMBIN TIME: 19.2 SECONDS (ref 13–19)

## 2025-05-28 NOTE — PROGRESS NOTES
Service Date: May 28, 2025    RE:  Lori Liu   MRN:  [id number]  :  1950      Dear Dr. Calderon:    We had the pleasure of seeing Lori Liu at the [facility]. Although you are familiar with this patient's history, please allow me to summarize it for the purpose of our records.     She is a 74-year-old female with past medical history significant for DVT/PE and interstitial lung disease related to hiatal hernia and recurrent aspiration.  She was referred to us to exclude pulmonary hypertension given her history of DVT/PE and ILD.    Her main symptom at present is exertional shortness of breath and dry cough.  She has no symptoms at rest or with her usual activities.  She gets shortness of breath only when she climbs a couple of flights of stairs, walk uphill or when carrying weight.  I would currently characterize as functional class II.  Her symptoms started 3 years ago after her DVT and PE but they are not progressive.  They have been stable.  She denies having lower extremity swelling or abdominal distention.  She has no exertional chest pain or chest pressure.  She has no exertional presyncope or syncope.  She does not use supplemental oxygen.  She has not had any recent hospitalization or ER visit.    She has no prior history of autoimmune diseases, congenital heart disease, liver disease, appetite suppressant drug use, and left heart disease.    PAST MEDICAL HISTORY:  1.  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  Interstitial lung disease likely related to hiatal hernia with recurrent aspiration  3.  Obstructive sleep apnea  4.  Hyperlipidemia  5.  Acid reflux disease    PAST SURGICAL HISTORY:  Past Surgical History:   Procedure Laterality Date    LAPAROSCOPIC NISSEN FUNDOPLICATION N/A 2024       CURRENT MEDICATIONS:  Current Outpatient Medications   Medication Sig Dispense Refill     cyanocobalamin (VITAMIN B-12) 500 MCG tablet Take 1,000 mcg by mouth daily      omeprazole (PRILOSEC) 20 MG DR capsule Take 20 mg by mouth 2 times daily.      rivaroxaban ANTICOAGULANT (XARELTO ANTICOAGULANT) 20 MG TABS tablet Take 1 tablet (20 mg) by mouth daily (with dinner). 90 tablet 0    rosuvastatin (CRESTOR) 20 MG tablet Take 20 mg by mouth daily      VITRON-C  MG TABS tablet Take 1 tablet by mouth daily       No current facility-administered medications for this visit.       ROS:   10 point ROS negative except as discussed in above HPI.    SOCIAL HISTORY:  She lives with her .  She is retired now.  She worked as a  in the past.  Her kids are grown and healthy.  She quit smoking when she was 59.  She is smoked for 40 years half a pack to 1 pack a day.  She does not use any recreational drugs.  She does not drink alcohol.    FAMILY HISTORY:  Family History   Problem Relation Age of Onset    Colon Cancer Mother     Clotting Disorder Father         Does not recall exact issue with bleeding vs clotting disorder    Lymphoma Sister     LUNG DISEASE No family hx of        EXAM:  /83 (BP Location: Left arm, Patient Position: Sitting, Cuff Size: Adult Large)   Pulse 57   Wt 101.6 kg (224 lb)   SpO2 96%   BMI 37.28 kg/m    Awake, alert, and oriented x 3.   Comfortable. No apparent distress.  No pallor, cyanosis, or clubbing  Carotids 2+ bilateral and pulse was regular in rhythm.  No JVD  Heart: regular, normal S1/S2, no murmur, gallop, rub  Lungs: NVBS and clear to auscultation bilaterally, no rales or wheezing.  Bilateral basal crepitations.  Abdomen: soft, non-tender, bowel sounds present, no hepatosplenomegaly  Extremities: no edema  Neurological: No gross focal neurological deficit    Labs:  Recent Results (from the past week)   Lipid Profile    Collection Time: 05/27/25  2:07 PM   Result Value Ref Range    Cholesterol 116 <200 mg/dL    Triglycerides 68 <150 mg/dL     Direct Measure HDL 60 >=50 mg/dL    LDL Cholesterol Calculated 42 <100 mg/dL    Non HDL Cholesterol 56 <130 mg/dL    Patient Fasting > 8hrs? No    CBC with platelets    Collection Time: 05/27/25  2:07 PM   Result Value Ref Range    WBC Count 6.5 4.0 - 11.0 10e3/uL    RBC Count 4.32 3.80 - 5.20 10e6/uL    Hemoglobin 14.2 11.7 - 15.7 g/dL    Hematocrit 42.8 35.0 - 47.0 %    MCV 99 78 - 100 fL    MCH 32.9 26.5 - 33.0 pg    MCHC 33.2 31.5 - 36.5 g/dL    RDW 13.7 10.0 - 15.0 %    Platelet Count 239 150 - 450 10e3/uL   INR    Collection Time: 05/27/25  2:07 PM   Result Value Ref Range    INR 1.41 (H) 0.85 - 1.15    PT 17.4 (H) 11.8 - 14.8 Seconds   N terminal pro BNP outpatient    Collection Time: 05/27/25  2:07 PM   Result Value Ref Range    NT-proBNP 239 0 - 624 pg/mL   TSH    Collection Time: 05/27/25  2:07 PM   Result Value Ref Range    TSH 0.91 0.30 - 4.20 uIU/mL   Lupus Anticoagulant Panel    Collection Time: 05/27/25  2:07 PM   Result Value Ref Range    PTT Ratio 1.39 (H) <1.30    PTT Confirm <8.0 <8.0 Seconds    PTT 1:2 MIX 41 31 - 45 Seconds    DRVVT Screen Ratio 1.94 (H) <1.08    DRVVT Confirm Normalized Ratio 1.36 (H) <1.21    DRVVT Screen Mix Ratio 1.44 (H) <1.08    Thrombin Time 19.2 (H) 13.0 - 19.0 Seconds    Lupus ANTI-XA Detected (A) Not Detected    Lupus Result Positive (A) Negative    Lupus Interpretation       Results  APTT ratio is elevated.  APTT Confirm is negative.  APTT Mix is normal.  DRVVT Screen ratio is elevated.  DRVVT Confirm Normalized ratio is positive.  DRVVT Screen Mix ratio is elevated.  The PT/INR is elevated.  Thrombin time is elevated.  Reactivity is detected in the Anti-Xa assay.    Interpretation  POSITIVE TEST; LUPUS ANTICOAGULANT TESTING IS POSITIVE; HOWEVER ANTICOAGULATION DETECTED     Recommendations  APTT ratio, APTT Confirm, and APTT Mix are consistent with factor deficiency.  DRVVT Screen ratio, DRVVT Confirm Normalized ratio, and DRVVT Screen Mix ratio  are consistent with a  Lupus Anticoagulant.  Clinical correlation is recommended.  Recommend repeat testing in 12 weeks to determine if the Lupus Anticoagulant is persisite or transient, since a transient one does not confer an increased thrombotic risk.  Patient with a Lupus Anticoagulant on vitamin K antagonist therapy (e.g., warfarin) should be considered for monitoring with a chromogenic factor 10 (factor X)  assay or a factor 2 (factor II) assay.  If the patient is on an anticoagulant, recommend repeat testing without anticoagulant interference, as anticoagulation may cause false positive or false negative findings.  Causes of a prolonged Thrombin Time include hypofibrinogenemia, dysfibrinogenemia, elevated fibrin degradation products (e.g., D-dimer), anticoagulants (e.g., heparin and direct thrombin inhibitors), and inhibitors.  The Anti-Xa assay is consistent with the presence of anticoagulant.  The patient is reported to be on rivaroxaban.     Nenita Mitchell MD, PhD  [facility]      Signout Location if Remote Report signed out at: [facility]    Hepatitis B core antibody    Collection Time: 05/27/25  2:07 PM   Result Value Ref Range    Hepatitis B Core Antibody Total Nonreactive Nonreactive   Hepatitis B Surface Antibody    Collection Time: 05/27/25  2:07 PM   Result Value Ref Range    Hepatitis B Surface Antibody Nonreactive     Hepatitis B Surface Antibody Instrument Value <3.50 <8.5 m[IU]/mL   Hepatitis B surface antigen    Collection Time: 05/27/25  2:07 PM   Result Value Ref Range    Hepatitis B Surface Antigen Nonreactive Nonreactive   Hepatitis C antibody    Collection Time: 05/27/25  2:07 PM   Result Value Ref Range    Hepatitis C Antibody Nonreactive Nonreactive   HIV Antigen Antibody Combo    Collection Time: 05/27/25  2:07 PM   Result Value Ref Range    HIV Antigen Antibody Combo Nonreactive Nonreactive   Iron and iron binding capacity    Collection Time: 05/27/25  2:07 PM   Result Value Ref Range    Iron 86 37 - 145  ug/dL    Iron Binding Capacity 254 240 - 430 ug/dL    Iron Sat Index 34 15 - 46 %         EKG (04/2025):  Sinus rhythm with sinus arrhythmia, left axis deviation and possible lateral infarct.    Echocardiogram (04/2025):  The left ventricle is normal in size.  There is moderate concentric left ventricular hypertrophy.  The visual ejection fraction is 50-55%.  No regional wall motion abnormalities noted.  The right ventricle is normal size.  The right ventricular systolic function is normal.  The right ventricular systolic pressure is approximated at 29mmHg plus the  right atrial pressure.  Ascending Aorta dilatation is present.  Ascending aorta measures 3.9 cm  Echo findings similar to echo from 1/15/24.      PFT (02/2025)        Latest Ref Rng & Units 2/19/2025     1:33 PM   PFT   FVC L 2.12    FEV1 L 1.85    FVC% % 81    FEV1% % 92      Her FEV1 by FVC ratio was 87%, TLC was 68%, and DLCO was 58% predicted.    V/Q scan (05/2025)  Low probability for chronic thromboembolic pulm hypertension.    CT Chest (01/2025)  No pulmonary embolism.     6MWT (05/2025):  She walked 256 m.  She desaturated on room air.  She required 2 L of supplemental oxygen to maintain a saturation more than 90%.    Assessment and Plan:     In summary, Lori Liu is a 74-year-old female with past medical history significant for DVT/PE and interstitial lung disease related to recurrent aspiration from hiatal hernia was referred to us for further evaluation and management of pulm hypertension.    Currently, the clinical index of suspicion for pulm hypertension in her is very low.  Clinically she has no evidence of right heart failure.  Her echocardiogram shows normal right ventricular size and function.  Her estimated right ventricular systolic pressure, although not a good TR jet and mild, is within normal limits.  Her NT proBNP is within normal limits.  Also, her VQ scan is low probability excluding chronic thromboembolic pulm  hypertension.  Her serological workup for other associated causes of pulmonary arterial hypertension is negative.    I suspect her exertional hypoxemia is secondary to her mild interstitial lung disease.  This also is likely the explanation for her exertional shortness of breath.  She will likely benefit from supplemental oxygen for symptomatic relief and for preventing progression of pulm vascular disease.  Will coordinate this with Dr. Allison Neely her pulmonologist.    No strong indication to do right heart catheterization.  She is also hesitant which I think is very reasonable.  No further testing needed.    I have recommended her to continue to follow with Dr. Allsion Calderon in pulmonary.  Will see her in a year with repeat echocardiogram.    It was a pleasure seeing Lori Liu at the [facility]. Please contact us with any questions or concerns that you may have. We thank you for involving us in this patients care.    Total time today was 75 minutes reviewing notes, imaging, labs, patient visit, orders and documentation     Sincerely,      Maricarmen Hernadez MD  Professor of Medicine  [facility]  Heart Failure, Transplant, and Mechanical Circulatory Support Cardiology   Cardiovascular Division  [facility]   [phone]

## 2025-05-29 LAB
IL6 SERPL-MCNC: 7.07 PG/ML
STFR SERPL-MCNC: 3.2 MG/L

## 2025-05-29 NOTE — PATIENT INSTRUCTIONS
You were seen today in the Pulmonary Hypertension Clinic at the [facility]    Cardiology Provider you saw during your visit:    Dr. Hernadez    Medication Changes:  No changes    Recommendations:   We will touch base with your pulmonary team to help order oxygen for you    Follow-up:   Follow-up in 1 year with labs and echocardiogram prior    Results:       Please call us immediately if you have any syncope (fainting or passing out), chest pain, edema (swelling or weight gain), or decline in your functional status (general decline in how you are feeling).    If you have emergent concerns after hours or on the weekend, please call our on-call Cardiologist at [phone], option [phone]. For emergencies call [phone].     Thank you for allowing us to be a part of your care here at the [facility]    Please visit the pulmonary hypertension website for more information and support. [email or web address]    If you have questions or concerns please contact us at:    George Castrejon RN (P: [phone])    Nurse Coordinator       Pulmonary Hypertension     [facility]         CONNIE Benitez   (Prior Auths & Patient Assistance )             ()  Clinic   Clinic   Pulmonary Hypertension   Pulmonary Hypertension  [facility]  (P)[phone]    (P) [phone]  (F) [phone]

## 2025-06-05 ENCOUNTER — TELEPHONE (OUTPATIENT)
Dept: PULMONOLOGY | Facility: CLINIC | Age: 75
End: 2025-06-05
Payer: COMMERCIAL

## 2025-06-05 NOTE — TELEPHONE ENCOUNTER
"12:35 PM- Phoned and spoke to patient regarding 6/6/25 appointment. Patient needs labs done that Dr. Cooper ordered. Patient had labs done on 5/27/25 which were Dr. Hernandez's lab order. Patient already had an appointment scheduled for 7/24/25. She cancelled tomorrow's appointment 6/6/25. I let her know that the lab orders are already in [facility] and that she can go to any [facility] to have them done. Patient wasn't real happy but like Dr. Cooper stated to me: \"even if patient were to get labs done today, they still wouldn't be resulted by tomorrow's appointment. She certainly can still keep appointment but there's not a whole lot to discuss\". I did ask her if she was having any breathing issues and if so, she certainly can keep appointment tomorrow. Patient said she wasn't really having any problems. I mentioned to her a couple different times that she still could come in tomorrow 6/6/25 and again patient said no. Patient is scheduled with Dr. Cooper on 7/24/25 at 10:30 AM.    Sp HODGE CMA [facility]      "

## 2025-06-16 ENCOUNTER — LAB (OUTPATIENT)
Dept: LAB | Facility: CLINIC | Age: 75
End: 2025-06-16
Payer: COMMERCIAL

## 2025-06-16 DIAGNOSIS — J84.9 INTERSTITIAL LUNG DISEASE (H): ICD-10-CM

## 2025-06-16 LAB
ALBUMIN SERPL BCG-MCNC: 3.8 G/DL (ref 3.5–5.2)
ALP SERPL-CCNC: 45 U/L (ref 40–150)
ALT SERPL W P-5'-P-CCNC: 9 U/L (ref 0–50)
ANION GAP SERPL CALCULATED.3IONS-SCNC: 12 MMOL/L (ref 7–15)
AST SERPL W P-5'-P-CCNC: 20 U/L (ref 0–45)
BILIRUB SERPL-MCNC: 0.5 MG/DL
BILIRUBIN DIRECT (ROCHE PRO & PURE): 0.31 MG/DL (ref 0–0.45)
BUN SERPL-MCNC: 11.9 MG/DL (ref 8–23)
CALCIUM SERPL-MCNC: 8.7 MG/DL (ref 8.8–10.4)
CHLORIDE SERPL-SCNC: 105 MMOL/L (ref 98–107)
CK SERPL-CCNC: 48 U/L (ref 26–192)
CREAT SERPL-MCNC: 0.83 MG/DL (ref 0.51–0.95)
CRP SERPL-MCNC: <3 MG/L
EGFRCR SERPLBLD CKD-EPI 2021: 73 ML/MIN/1.73M2
ERYTHROCYTE [SEDIMENTATION RATE] IN BLOOD BY WESTERGREN METHOD: 17 MM/HR (ref 0–30)
GLUCOSE SERPL-MCNC: 95 MG/DL (ref 70–99)
HCO3 SERPL-SCNC: 23 MMOL/L (ref 22–29)
POTASSIUM SERPL-SCNC: 4.1 MMOL/L (ref 3.4–5.3)
PROT SERPL-MCNC: 6.4 G/DL (ref 6.4–8.3)
RHEUMATOID FACT SERPL-ACNC: <10 IU/ML
SODIUM SERPL-SCNC: 140 MMOL/L (ref 135–145)

## 2025-06-16 PROCEDURE — 86200 CCP ANTIBODY: CPT

## 2025-06-16 PROCEDURE — 86235 NUCLEAR ANTIGEN ANTIBODY: CPT

## 2025-06-16 PROCEDURE — 86606 ASPERGILLUS ANTIBODY: CPT

## 2025-06-16 PROCEDURE — 82085 ASSAY OF ALDOLASE: CPT

## 2025-06-16 PROCEDURE — 82550 ASSAY OF CK (CPK): CPT

## 2025-06-16 PROCEDURE — 82784 ASSAY IGA/IGD/IGG/IGM EACH: CPT

## 2025-06-16 PROCEDURE — 86140 C-REACTIVE PROTEIN: CPT

## 2025-06-16 PROCEDURE — 86038 ANTINUCLEAR ANTIBODIES: CPT

## 2025-06-16 PROCEDURE — 82248 BILIRUBIN DIRECT: CPT

## 2025-06-16 PROCEDURE — 36415 COLL VENOUS BLD VENIPUNCTURE: CPT

## 2025-06-16 PROCEDURE — 85652 RBC SED RATE AUTOMATED: CPT

## 2025-06-16 PROCEDURE — 86331 IMMUNODIFFUSION OUCHTERLONY: CPT

## 2025-06-16 PROCEDURE — 86431 RHEUMATOID FACTOR QUANT: CPT

## 2025-06-16 PROCEDURE — 86036 ANCA SCREEN EACH ANTIBODY: CPT

## 2025-06-16 PROCEDURE — 82787 IGG 1 2 3 OR 4 EACH: CPT

## 2025-06-17 ENCOUNTER — RESULTS FOLLOW-UP (OUTPATIENT)
Dept: MULTI SPECIALTY CLINIC | Facility: CLINIC | Age: 75
End: 2025-06-17

## 2025-06-17 LAB
ALDOLASE SERPL-CCNC: 3.5 U/L
ANA SER QL IF: NEGATIVE
ANCA AB PATTERN SER IF-IMP: NORMAL
C-ANCA TITR SER IF: NORMAL {TITER}
CCP AB SER IA-ACNC: 1.9 U/ML
ENA JO1 AB SER IA-ACNC: <0.5 U/ML
ENA JO1 IGG SER-ACNC: NEGATIVE
ENA SCL70 IGG SER IA-ACNC: 1.1 U/ML
ENA SCL70 IGG SER IA-ACNC: NEGATIVE
ENA SS-A AB SER IA-ACNC: <0.5 U/ML
ENA SS-A AB SER IA-ACNC: NEGATIVE
ENA SS-B IGG SER IA-ACNC: <0.6 U/ML
ENA SS-B IGG SER IA-ACNC: NEGATIVE
IGG SERPL-MCNC: 1005 MG/DL (ref 610–1616)
IGG SERPL-MCNC: 1005 MG/DL (ref 610–1616)
IGG1 SER-MCNC: 667 MG/DL (ref 382–929)
IGG2 SER-MCNC: 207 MG/DL (ref 242–700)
IGG3 SER-MCNC: 62 MG/DL (ref 22–176)
IGG4 SER-MCNC: 27 MG/DL (ref 4–86)

## 2025-06-20 LAB
A FLAVUS AB SER QL ID: NORMAL
A FUMIGATUS1 AB SER QL ID: NORMAL
A FUMIGATUS2 AB SER QL ID: NORMAL
A FUMIGATUS3 AB SER QL ID: NORMAL
A FUMIGATUS6 AB SER QL ID: NORMAL
A PULLULANS AB SER QL ID: NORMAL
PIGEON SERUM AB QL ID: NORMAL
S RECTIVIRGULA AB SER QL ID: NORMAL
S VIRIDIS AB SER QL ID: NORMAL
T CANDIDUS AB SER QL: NORMAL

## 2025-07-22 ENCOUNTER — MEDICAL CORRESPONDENCE (OUTPATIENT)
Dept: HEALTH INFORMATION MANAGEMENT | Facility: CLINIC | Age: 75
End: 2025-07-22
Payer: COMMERCIAL

## 2025-07-24 ENCOUNTER — OFFICE VISIT (OUTPATIENT)
Dept: PULMONOLOGY | Facility: CLINIC | Age: 75
End: 2025-07-24
Attending: INTERNAL MEDICINE
Payer: COMMERCIAL

## 2025-07-24 VITALS
DIASTOLIC BLOOD PRESSURE: 62 MMHG | BODY MASS INDEX: 37.71 KG/M2 | HEART RATE: 54 BPM | WEIGHT: 226.6 LBS | OXYGEN SATURATION: 96 % | SYSTOLIC BLOOD PRESSURE: 138 MMHG

## 2025-07-24 DIAGNOSIS — J84.9 INTERSTITIAL LUNG DISEASE (H): Primary | ICD-10-CM

## 2025-07-24 PROCEDURE — 3075F SYST BP GE 130 - 139MM HG: CPT | Performed by: INTERNAL MEDICINE

## 2025-07-24 PROCEDURE — 99213 OFFICE O/P EST LOW 20 MIN: CPT | Performed by: INTERNAL MEDICINE

## 2025-07-24 PROCEDURE — 3078F DIAST BP <80 MM HG: CPT | Performed by: INTERNAL MEDICINE

## 2025-07-24 NOTE — PROGRESS NOTES
Pulmonary Clinic Follow Up    Assessment:  74yoF with pulmonary fibrosis I believe secondary to hiatal hernia and reflux but potentially IPF as well based on honeycombing.         Plan:   Annual PFTs and CT chest. No change from 6 months ago.   6-minute walk did find desaturation but patient not currently interested in oxygen. May consider in future.   Would consider pirfenidone or an antifibrotic if DLCO worsens.   Echo has found no pulmonary hypertension.   Download CPAP from [employer] to make sure it is effective  Obtain original sleep study as well--unable to obtain this.   RTC 8/2026 with PFTs.     Yumiko Cooper MD  Pulmonary/Critical Care    **Addendum: CPAP [id number] auto  25/30 days, 83%  AHI 0.3, 0.1 central, 0.1 obstructive  Leak 14.4/min  Effective CPAP, ongoing use encouraged.     ---------------------------------    CCx: pulmonary fibrosis    HPI: 74yoF with history of PE in 2021 on DOAC, morbid obesity with IPF. She also has a hiatal hernia that has been repaired 1 year ago.     Breathing stable. 6-minute walk found mild exertional hypoxia.     CPAP machine at home. SHASHI diagnosed 2022.     Remains on anticoagulation life long. No significant wheezing, but coughing and shortness of breath are the main issues.     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 Denies occupational exposure.     ROS:  A 12-system review was notable for cough shortness of breath, easy bleeding due to DOAC. The remainder reviewed and negative.     Allergies:  Allergies   Allergen Reactions    Cephalexin Itching and Rash    Penicillins Hives, Itching and Rash       Current Meds:  Current Outpatient Medications   Medication Sig Dispense Refill    cyanocobalamin (VITAMIN B-12) 500 MCG tablet Take 1,000 mcg by mouth daily      omeprazole (PRILOSEC) 20 MG DR capsule Take 20 mg by mouth 2 times daily.      rivaroxaban ANTICOAGULANT (XARELTO ANTICOAGULANT) 20 MG TABS tablet  Take 1 tablet (20 mg) by mouth daily (with dinner). 90 tablet 0    rosuvastatin (CRESTOR) 20 MG tablet Take 20 mg by mouth daily      VITRON-C  MG TABS tablet Take 1 tablet by mouth daily       No current facility-administered medications for this visit.         Physical Exam:  /62 (BP Location: Left arm, Patient Position: Sitting, Cuff Size: Adult Large)   Pulse 54   Wt 102.8 kg (226 lb 9.6 oz)   SpO2 96%   BMI 37.71 kg/m    Gen: alert, oriented, no distress  HEENT: anicteric sclera, moist mucus membranes.   Neck: trachea midline, no cervical or supraclavicular lymphadenopathy  CV: Regular rate and rhythm, normal S1 and S2.   Resp: rales at bases.   Abd: soft, nontender  Skin: no visible rashes or lesions.   Ext: no cyanosis, clubbing or edema  Neuro: alert, nonfocal, grossly normal.     Labs:  Recent Labs   Lab Test 04/01/24  0950 07/28/22  1447 10/20/21  0641   WBC  --   --  6.1   RBC  --   --  3.52*   HGB 13.9   < > 10.6*   HCT  --   --  34.2*   MCV  --   --  97   MCH  --   --  30.1   MCHC  --   --  31.0*   RDW  --   --  17.8*   PLT  --   --  261    < > = values in this interval not displayed.     Results for orders placed or performed in visit on 06/16/25   Basic metabolic panel    Collection Time: 06/16/25 11:39 AM   Result Value Ref Range    Sodium 140 135 - 145 mmol/L    Potassium 4.1 3.4 - 5.3 mmol/L    Chloride 105 98 - 107 mmol/L    Carbon Dioxide (CO2) 23 22 - 29 mmol/L    Anion Gap 12 7 - 15 mmol/L    Urea Nitrogen 11.9 8.0 - 23.0 mg/dL    Creatinine 0.83 0.51 - 0.95 mg/dL    GFR Estimate 73 >60 mL/min/1.73m2    Calcium 8.7 (L) 8.8 - 10.4 mg/dL    Glucose 95 70 - 99 mg/dL           Imaging studies:  Personally reviewed image/s and Personally reviewed impression/s  EXAM: CT CHEST PULMONARY EMBOLISM W CONTRAST  LOCATION: [facility]  DATE: 1/19/2025     INDICATION: pleuritic sscp and sob with low sats today. h  o PE  COMPARISON: 1/15/2025  TECHNIQUE: CT chest  pulmonary angiogram during arterial phase injection of IV contrast. Multiplanar reformats and MIP reconstructions were performed. Dose reduction techniques were used.   CONTRAST: 75ml Isovue 370     FINDINGS:  ANGIOGRAM CHEST: The main pulmonary artery is again dilated. No pulmonary embolism. Thoracic aorta is negative for dissection.     LUNGS AND PLEURA: Similar mild peripheral fibrosis. No pleural effusions.     MEDIASTINUM/AXILLAE: No thoracic aortic aneurysm. No lymphadenopathy. Similar mild distal esophageal wall thickening. There is again right ventricular enlargement.     CORONARY ARTERY CALCIFICATION: Mild.     UPPER ABDOMEN: Cholecystectomy.     MUSCULOSKELETAL: Unremarkable                                                                      IMPRESSION:  1.  No pulmonary embolism.      PFT's   Personally reviewed with patient.     Mild restriction with moderate diffusion capacity deficit.

## (undated) DEVICE — TUBE PENROSE 3/8 X 12 STRL LTX 0912020

## (undated) DEVICE — DAVINCI XI HANDPIECE ESU VESSEL SEALER 8MM EXT 480422

## (undated) DEVICE — DEVICE CLOSURE V-LOC 0 GS-21 6IN VLOCN0306

## (undated) DEVICE — LUBRICANT INST ELECTROLUBE EL101

## (undated) DEVICE — SUCTION MANIFOLD NEPTUNE 2 SYS 1 PORT 702-025-000

## (undated) DEVICE — NEEDLE HYPO MAGELLAN SAFETY 22GA 1 1/2IN 8881850215

## (undated) DEVICE — PREP CHLORAPREP 26ML TINTED HI-LITE ORANGE 930815

## (undated) DEVICE — SYR 10ML LL W/O NDL 302995

## (undated) DEVICE — POSITIONING KIT THE PINK PAD XL 40X20X1IN 40583 (COI)

## (undated) DEVICE — SOL WATER IRRIG 1000ML BOTTLE 2F7114

## (undated) DEVICE — ELECTRODE PATIENT RETURN ADULT L10 FT 2 PLATE CORD 0855C

## (undated) DEVICE — DAVINCI XI DRAPE COLUMN 470341

## (undated) DEVICE — DECANTER VIAL 2006S

## (undated) DEVICE — DRAPE U SPLIT 74X120" 29440

## (undated) DEVICE — DAVINCI XI DRAPE ARM 470015

## (undated) DEVICE — SUTURE MONOCRYL+ 4-0 PS-2 27IN MCP426H

## (undated) DEVICE — Device

## (undated) DEVICE — SUTURE VICRYL+ 2-0 27IN SH UND VCP417H

## (undated) DEVICE — GLOVE BIOGEL PI ORTHOPRO SZ 7.5 47675

## (undated) DEVICE — BLADE KNIFE SURG 11 371111

## (undated) DEVICE — NDL INSUFFLATION 13GA 120MM C2201

## (undated) DEVICE — SU ETHIBOND 0 CT-2 30" X412H

## (undated) DEVICE — DRSG BANDAID 3/4X3" FABRIC CURITY LATEX FREE 44100

## (undated) DEVICE — DAVINCI XI SEAL UNIVERSAL 5-12MM 470500

## (undated) DEVICE — TUBING SMOKE EVAC PNEUMOCLEAR HIGH FLOW 0620050250

## (undated) DEVICE — DAVINCI XI OBTURATOR BLADELESS 8MM 470359

## (undated) DEVICE — SYR 50ML SLIP TIP W/O NDL 309654

## (undated) DEVICE — CUSTOM PACK LAP CHOLE SBA5BLCHEA

## (undated) DEVICE — DRAPE SHEET REV FOLD 3/4 9349

## (undated) RX ORDER — VASOPRESSIN 20 U/ML
INJECTION PARENTERAL
Status: DISPENSED
Start: 2024-08-27

## (undated) RX ORDER — FENTANYL CITRATE 50 UG/ML
INJECTION, SOLUTION INTRAMUSCULAR; INTRAVENOUS
Status: DISPENSED
Start: 2024-08-27

## (undated) RX ORDER — LABETALOL HYDROCHLORIDE 5 MG/ML
INJECTION, SOLUTION INTRAVENOUS
Status: DISPENSED
Start: 2024-08-27